# Patient Record
Sex: FEMALE | Race: WHITE | NOT HISPANIC OR LATINO | Employment: OTHER | URBAN - NONMETROPOLITAN AREA
[De-identification: names, ages, dates, MRNs, and addresses within clinical notes are randomized per-mention and may not be internally consistent; named-entity substitution may affect disease eponyms.]

---

## 2017-02-14 ENCOUNTER — OFFICE VISIT (OUTPATIENT)
Dept: INTERNAL MEDICINE | Facility: CLINIC | Age: 67
End: 2017-02-14

## 2017-02-14 VITALS
OXYGEN SATURATION: 97 % | TEMPERATURE: 97.6 F | BODY MASS INDEX: 29.71 KG/M2 | WEIGHT: 174 LBS | HEART RATE: 88 BPM | DIASTOLIC BLOOD PRESSURE: 66 MMHG | RESPIRATION RATE: 14 BRPM | SYSTOLIC BLOOD PRESSURE: 110 MMHG | HEIGHT: 64 IN

## 2017-02-14 DIAGNOSIS — I10 ESSENTIAL HYPERTENSION: Primary | ICD-10-CM

## 2017-02-14 DIAGNOSIS — Z23 NEED FOR IMMUNIZATION AGAINST INFLUENZA: ICD-10-CM

## 2017-02-14 DIAGNOSIS — M85.80 OSTEOPENIA: ICD-10-CM

## 2017-02-14 DIAGNOSIS — E78.5 DYSLIPIDEMIA: ICD-10-CM

## 2017-02-14 DIAGNOSIS — K21.9 GASTROESOPHAGEAL REFLUX DISEASE WITHOUT ESOPHAGITIS: ICD-10-CM

## 2017-02-14 DIAGNOSIS — M79.642 PAIN OF LEFT HAND: ICD-10-CM

## 2017-02-14 DIAGNOSIS — E11.8 TYPE 2 DIABETES MELLITUS WITH COMPLICATION, WITHOUT LONG-TERM CURRENT USE OF INSULIN (HCC): ICD-10-CM

## 2017-02-14 LAB
ALBUMIN SERPL-MCNC: 4.4 G/DL (ref 3.2–4.8)
ALBUMIN/GLOB SERPL: 1.9 G/DL (ref 1.5–2.5)
ALP SERPL-CCNC: 70 U/L (ref 25–100)
ALT SERPL W P-5'-P-CCNC: 14 U/L (ref 7–40)
ANION GAP SERPL CALCULATED.3IONS-SCNC: 7 MMOL/L (ref 3–11)
ARTICHOKE IGE QN: 104 MG/DL (ref 0–130)
AST SERPL-CCNC: 17 U/L (ref 0–33)
BILIRUB SERPL-MCNC: 0.6 MG/DL (ref 0.3–1.2)
BUN BLD-MCNC: 10 MG/DL (ref 9–23)
BUN/CREAT SERPL: 14.3 (ref 7–25)
CALCIUM SPEC-SCNC: 10.2 MG/DL (ref 8.7–10.4)
CHLORIDE SERPL-SCNC: 106 MMOL/L (ref 99–109)
CHOLEST SERPL-MCNC: 160 MG/DL (ref 0–200)
CK SERPL-CCNC: 79 U/L (ref 26–174)
CO2 SERPL-SCNC: 30 MMOL/L (ref 20–31)
CREAT BLD-MCNC: 0.7 MG/DL (ref 0.6–1.3)
GFR SERPL CREATININE-BSD FRML MDRD: 84 ML/MIN/1.73
GLOBULIN UR ELPH-MCNC: 2.3 GM/DL
GLUCOSE BLD-MCNC: 134 MG/DL (ref 70–100)
HBA1C MFR BLD: 6.7 % (ref 4.8–5.6)
HCV AB SER DONR QL: NORMAL
HDLC SERPL-MCNC: 41 MG/DL (ref 40–60)
POTASSIUM BLD-SCNC: 4 MMOL/L (ref 3.5–5.5)
PROT SERPL-MCNC: 6.7 G/DL (ref 5.7–8.2)
SODIUM BLD-SCNC: 143 MMOL/L (ref 132–146)
TRIGL SERPL-MCNC: 136 MG/DL (ref 0–150)

## 2017-02-14 PROCEDURE — 99214 OFFICE O/P EST MOD 30 MIN: CPT | Performed by: INTERNAL MEDICINE

## 2017-02-14 PROCEDURE — 90662 IIV NO PRSV INCREASED AG IM: CPT | Performed by: INTERNAL MEDICINE

## 2017-02-14 PROCEDURE — 90471 IMMUNIZATION ADMIN: CPT | Performed by: INTERNAL MEDICINE

## 2017-02-14 PROCEDURE — 80061 LIPID PANEL: CPT | Performed by: INTERNAL MEDICINE

## 2017-02-14 PROCEDURE — 82550 ASSAY OF CK (CPK): CPT | Performed by: INTERNAL MEDICINE

## 2017-02-14 PROCEDURE — 80053 COMPREHEN METABOLIC PANEL: CPT | Performed by: INTERNAL MEDICINE

## 2017-02-14 PROCEDURE — 86803 HEPATITIS C AB TEST: CPT | Performed by: INTERNAL MEDICINE

## 2017-02-14 PROCEDURE — 83036 HEMOGLOBIN GLYCOSYLATED A1C: CPT | Performed by: INTERNAL MEDICINE

## 2017-02-14 RX ORDER — LISINOPRIL 5 MG/1
5 TABLET ORAL DAILY
Qty: 90 TABLET | Refills: 3 | Status: SHIPPED | OUTPATIENT
Start: 2017-02-14 | End: 2018-06-04 | Stop reason: SDUPTHER

## 2017-02-14 RX ORDER — LOVASTATIN 40 MG/1
40 TABLET ORAL DAILY
Qty: 90 TABLET | Refills: 3 | Status: SHIPPED | OUTPATIENT
Start: 2017-02-14 | End: 2018-06-04 | Stop reason: SDUPTHER

## 2017-02-14 NOTE — PROGRESS NOTES
Subjective   Reema Brothers is a 66 y.o. female.     Chief Complaint   Patient presents with   • Hypertension     here for follow up        History of Present Illness   Patient here for follow-up.  Diabetes is stable medication.  Hyperlipidemia stable medication.  Hypothyroidism stable medication.  Vitamin D deficiency on supplement to stable.  Hypertension stable medication.  Allergies stable medication.  DEXA scan showed osteopenia on calcium and vitamin D now.  Complaining occasional heartburn epigastric area patient is not taking stomach medication.  Left hand arthritis refused to see rheumatologist or hand surgeon.  Patient still smokes.    Current Outpatient Prescriptions:   •  aspirin (ASPIRIN EC LO-DOSE) 81 MG EC tablet, Take 1 tablet by mouth daily., Disp: , Rfl:   •  Cholecalciferol (VITAMIN D3) 400 UNITS capsule, Take 1 tablet by mouth daily., Disp: , Rfl:   •  levothyroxine (SYNTHROID, LEVOTHROID) 100 MCG tablet, TAKE ONE TABLET BY MOUTH ONCE DAILY, Disp: 90 tablet, Rfl: 0  •  lisinopril (PRINIVIL,ZESTRIL) 5 MG tablet, Take 1 tablet by mouth Daily., Disp: 90 tablet, Rfl: 3  •  lovastatin (MEVACOR) 40 MG tablet, Take 1 tablet by mouth Daily., Disp: 90 tablet, Rfl: 3  •  metFORMIN (GLUCOPHAGE) 500 MG tablet, Take 1 tablet by mouth 2 (Two) Times a Day., Disp: 180 tablet, Rfl: 3    The following portions of the patient's history were reviewed and updated as appropriate: allergies, current medications, past family history, past medical history, past social history, past surgical history and problem list.    Review of Systems   Constitutional: Negative.    Respiratory: Negative.    Cardiovascular: Negative.    Gastrointestinal: Positive for abdominal pain (epigastric the heartburnmild tenderness).   Musculoskeletal: Negative.    Skin: Negative.    Neurological: Negative.    Psychiatric/Behavioral: Negative.        Objective   Physical Exam   Constitutional: She is oriented to person, place, and time. She  appears well-nourished.   Neck: Neck supple.   Cardiovascular: Normal rate, regular rhythm and normal heart sounds.    Pulmonary/Chest: Effort normal and breath sounds normal.   Abdominal: Bowel sounds are normal. She exhibits no mass. There is tenderness (mild tenderness epigastric area). There is no rebound and no guarding. No hernia.   Neurological: She is alert and oriented to person, place, and time.   Skin: Skin is warm.   Psychiatric: She has a normal mood and affect.       All tests have been reviewed.    Assessment/Plan   There are no diagnoses linked to this encounter.          Diabetes continue medicine do lab  Hyperlipidemia continue medicine. Do lab  Hypothyroidism stable medication continue  Vitamin D deficiency continue supplements 2000u daily do lab  hypertension continue medication   Allergies continue medication  osteopenia continue calcium. S/p dexa  GERD heartburn encourage patient to start the Nexium patient already has.  Patient declined EGD  left hand unable to  and tingling pending to see hand surgeon or rheum, decline now  refuse colonoscopy again 2/2017  tob to quit  gyn per gyn done,   flu and pneumovax done, zostavax 2015, prevnar 8/2016  follow up 10 day after labs

## 2017-02-23 ENCOUNTER — OFFICE VISIT (OUTPATIENT)
Dept: INTERNAL MEDICINE | Facility: CLINIC | Age: 67
End: 2017-02-23

## 2017-02-23 VITALS
SYSTOLIC BLOOD PRESSURE: 122 MMHG | TEMPERATURE: 97.6 F | DIASTOLIC BLOOD PRESSURE: 66 MMHG | OXYGEN SATURATION: 97 % | HEIGHT: 64 IN | WEIGHT: 174 LBS | BODY MASS INDEX: 29.71 KG/M2 | HEART RATE: 88 BPM | RESPIRATION RATE: 14 BRPM

## 2017-02-23 DIAGNOSIS — E03.8 OTHER SPECIFIED HYPOTHYROIDISM: ICD-10-CM

## 2017-02-23 DIAGNOSIS — M85.80 OSTEOPENIA: ICD-10-CM

## 2017-02-23 DIAGNOSIS — M54.50 BILATERAL LOW BACK PAIN WITHOUT SCIATICA, UNSPECIFIED CHRONICITY: ICD-10-CM

## 2017-02-23 DIAGNOSIS — K21.9 GASTROESOPHAGEAL REFLUX DISEASE WITHOUT ESOPHAGITIS: ICD-10-CM

## 2017-02-23 DIAGNOSIS — I10 ESSENTIAL HYPERTENSION: Primary | ICD-10-CM

## 2017-02-23 DIAGNOSIS — E11.8 TYPE 2 DIABETES MELLITUS WITH COMPLICATION, WITHOUT LONG-TERM CURRENT USE OF INSULIN (HCC): ICD-10-CM

## 2017-02-23 DIAGNOSIS — Q89.1 CONGENITAL ANOMALY OF ADRENAL GLAND: ICD-10-CM

## 2017-02-23 DIAGNOSIS — E78.5 DYSLIPIDEMIA: ICD-10-CM

## 2017-02-23 DIAGNOSIS — E55.9 VITAMIN D DEFICIENCY: ICD-10-CM

## 2017-02-23 DIAGNOSIS — M79.642 PAIN OF LEFT HAND: ICD-10-CM

## 2017-02-23 PROCEDURE — 99214 OFFICE O/P EST MOD 30 MIN: CPT | Performed by: INTERNAL MEDICINE

## 2017-02-23 PROCEDURE — 90714 TD VACC NO PRESV 7 YRS+ IM: CPT | Performed by: INTERNAL MEDICINE

## 2017-02-23 PROCEDURE — 90471 IMMUNIZATION ADMIN: CPT | Performed by: INTERNAL MEDICINE

## 2017-02-23 NOTE — PROGRESS NOTES
Subjective   Reema Brothers is a 66 y.o. female.     Chief Complaint   Patient presents with   • Results     Here for follow up        History of Present Illness   Patient here for follow-up.  The diabetes is stable medication.  Hyperlipidemia stable medication.  Hypothyroidism stable medication.  Vitamin D stable medication.  Hypertension stable medication.  GERD stable medication.  Patient still smokes.    Current Outpatient Prescriptions:   •  aspirin (ASPIRIN EC LO-DOSE) 81 MG EC tablet, Take 1 tablet by mouth daily., Disp: , Rfl:   •  Cholecalciferol (VITAMIN D3) 400 UNITS capsule, Take 1 tablet by mouth daily., Disp: , Rfl:   •  levothyroxine (SYNTHROID, LEVOTHROID) 100 MCG tablet, TAKE ONE TABLET BY MOUTH ONCE DAILY, Disp: 90 tablet, Rfl: 0  •  lisinopril (PRINIVIL,ZESTRIL) 5 MG tablet, Take 1 tablet by mouth Daily., Disp: 90 tablet, Rfl: 3  •  lovastatin (MEVACOR) 40 MG tablet, Take 1 tablet by mouth Daily., Disp: 90 tablet, Rfl: 3  •  metFORMIN (GLUCOPHAGE) 500 MG tablet, Take 1 tablet by mouth 2 (Two) Times a Day., Disp: 180 tablet, Rfl: 3    The following portions of the patient's history were reviewed and updated as appropriate: allergies, current medications, past family history, past medical history, past social history, past surgical history and problem list.    Review of Systems   Constitutional: Negative.    Respiratory: Negative.    Cardiovascular: Negative.    Gastrointestinal: Negative.    Musculoskeletal: Negative.    Skin: Negative.    Neurological: Negative.    Psychiatric/Behavioral: Negative.        Objective   Physical Exam   Constitutional: She is oriented to person, place, and time. She appears well-nourished.   Neck: Neck supple.   Cardiovascular: Normal rate, regular rhythm and normal heart sounds.    Pulmonary/Chest: Effort normal and breath sounds normal.   Abdominal: Bowel sounds are normal.   Neurological: She is alert and oriented to person, place, and time.   Skin: Skin is  warm.   Psychiatric: She has a normal mood and affect.       All tests have been reviewed.    Assessment/Plan   There are no diagnoses linked to this encounter.           Diabetes continue medicine   Hyperlipidemia continue medicine.   Hypothyroidism stable medication continue  Vitamin D deficiency continue supplements 2000u daily  hypertension continue medication   Allergies continue medication  osteopenia continue calcium. S/p dexa  GERD heartburn improved after med, continue.  left hand unable to  and tingling pending to see hand surgeon or rheum, decline now  refuse colonoscopy again 2/2017  tob to quit, do LDCT patient decline.   gyn per gyn done,   VacUTD, flu and pneumovax done, zostavax 2015, prevnar 8/2016, Td today  follow up  6 mo

## 2017-09-05 ENCOUNTER — TRANSCRIBE ORDERS (OUTPATIENT)
Dept: MAMMOGRAPHY | Facility: HOSPITAL | Age: 67
End: 2017-09-05

## 2017-09-05 DIAGNOSIS — Z13.9 SCREENING: Primary | ICD-10-CM

## 2017-09-06 ENCOUNTER — OFFICE VISIT (OUTPATIENT)
Dept: INTERNAL MEDICINE | Facility: CLINIC | Age: 67
End: 2017-09-06

## 2017-09-06 ENCOUNTER — RESULTS ENCOUNTER (OUTPATIENT)
Dept: INTERNAL MEDICINE | Facility: CLINIC | Age: 67
End: 2017-09-06

## 2017-09-06 VITALS
HEIGHT: 64 IN | OXYGEN SATURATION: 97 % | DIASTOLIC BLOOD PRESSURE: 80 MMHG | RESPIRATION RATE: 14 BRPM | BODY MASS INDEX: 32.1 KG/M2 | TEMPERATURE: 98.1 F | HEART RATE: 84 BPM | WEIGHT: 188 LBS | SYSTOLIC BLOOD PRESSURE: 124 MMHG

## 2017-09-06 DIAGNOSIS — E78.5 DYSLIPIDEMIA: ICD-10-CM

## 2017-09-06 DIAGNOSIS — E55.9 VITAMIN D DEFICIENCY: ICD-10-CM

## 2017-09-06 DIAGNOSIS — E11.8 TYPE 2 DIABETES MELLITUS WITH COMPLICATION, WITHOUT LONG-TERM CURRENT USE OF INSULIN (HCC): ICD-10-CM

## 2017-09-06 DIAGNOSIS — H61.23 CERUMEN DEBRIS ON TYMPANIC MEMBRANE, BILATERAL: ICD-10-CM

## 2017-09-06 DIAGNOSIS — I10 ESSENTIAL HYPERTENSION: ICD-10-CM

## 2017-09-06 DIAGNOSIS — Q89.1 CONGENITAL ANOMALY OF ADRENAL GLAND: ICD-10-CM

## 2017-09-06 DIAGNOSIS — I10 ESSENTIAL HYPERTENSION: Primary | ICD-10-CM

## 2017-09-06 DIAGNOSIS — E03.8 OTHER SPECIFIED HYPOTHYROIDISM: ICD-10-CM

## 2017-09-06 DIAGNOSIS — K21.9 GASTROESOPHAGEAL REFLUX DISEASE WITHOUT ESOPHAGITIS: ICD-10-CM

## 2017-09-06 DIAGNOSIS — M79.642 PAIN OF LEFT HAND: ICD-10-CM

## 2017-09-06 DIAGNOSIS — M85.80 OSTEOPENIA: ICD-10-CM

## 2017-09-06 PROCEDURE — 99214 OFFICE O/P EST MOD 30 MIN: CPT | Performed by: INTERNAL MEDICINE

## 2017-09-06 PROCEDURE — 69209 REMOVE IMPACTED EAR WAX UNI: CPT | Performed by: INTERNAL MEDICINE

## 2017-09-06 RX ORDER — LEVOTHYROXINE SODIUM 0.1 MG/1
100 TABLET ORAL DAILY
Qty: 90 TABLET | Refills: 3 | Status: SHIPPED | OUTPATIENT
Start: 2017-09-06 | End: 2018-07-21 | Stop reason: SDUPTHER

## 2017-09-06 NOTE — PROGRESS NOTES
Subjective   Reema Brothers is a 67 y.o. female.     Chief Complaint   Patient presents with   • Hypertension     Here for follow up        History of Present Illness   Patient here for follow-up.  Complaining wrist pain carpal tunnel syndrome numbness tingling or radiation to 4 or him some.  Hyperlipidemia stable medication.  Hypothyroidism stable medication.  Hypertension stable medication.  The GERD stable now.  Patient gain 14 pound.  quit smoking for 6 mo.  Vitamin D stable on supplement.  Complaining ear fullness.    Current Outpatient Prescriptions:   •  aspirin (ASPIRIN EC LO-DOSE) 81 MG EC tablet, Take 1 tablet by mouth daily., Disp: , Rfl:   •  Cholecalciferol (VITAMIN D3) 400 UNITS capsule, Take 1 tablet by mouth daily., Disp: , Rfl:   •  levothyroxine (SYNTHROID, LEVOTHROID) 100 MCG tablet, Take 1 tablet by mouth Daily., Disp: 90 tablet, Rfl: 3  •  lisinopril (PRINIVIL,ZESTRIL) 5 MG tablet, Take 1 tablet by mouth Daily., Disp: 90 tablet, Rfl: 3  •  lovastatin (MEVACOR) 40 MG tablet, Take 1 tablet by mouth Daily., Disp: 90 tablet, Rfl: 3  •  metFORMIN (GLUCOPHAGE) 500 MG tablet, Take 1 tablet by mouth 2 (Two) Times a Day., Disp: 180 tablet, Rfl: 3    The following portions of the patient's history were reviewed and updated as appropriate: allergies, current medications, past family history, past medical history, past social history, past surgical history and problem list.    Review of Systems   Constitutional: Negative.    HENT:        Ear fullness   Respiratory: Negative.    Cardiovascular: Negative.    Gastrointestinal: Negative.    Musculoskeletal: Positive for arthralgias.   Skin: Negative.    Neurological: Negative.    Psychiatric/Behavioral: Negative.        Objective   Physical Exam   Constitutional: She is oriented to person, place, and time. She appears well-nourished.   HENT:   sarah cerumen impaction   Neck: Neck supple.   Cardiovascular: Normal rate, regular rhythm and normal heart sounds.     Pulmonary/Chest: Effort normal and breath sounds normal.   Abdominal: Bowel sounds are normal.   Musculoskeletal: She exhibits tenderness.   Neurological: She is alert and oriented to person, place, and time.   Skin: Skin is warm.   Psychiatric: She has a normal mood and affect.         All tests have been reviewed.    Assessment/Plan   There are no diagnoses linked to this encounter.           Diabetes continue medicine   Cerumen impaction s/p irrigation today fullness resolved  Hyperlipidemia continue medicine.   Hypothyroidism stable medication continue  Vitamin D deficiency continue supplements 2000u daily  hypertension continue medication   Allergies continue medication  osteopenia continue calcium. S/p dexa  GERD heartburn improved after med, continue.  left hand unable to  and tingling refer to hand surgeon---  refuse colonoscopy again 2/2017  tob  quit 6 mo , do LDCT patient decline.   gyn per gyn done,   VacUTD, flu and pneumovax done, zostavax 2015, prevnar 8/2016, Td 2/23/17  follow up  1 mo PE after labs    Ear Cerumen Removal Instrumentation  Date/Time: 9/6/2017 11:30 AM  Performed by: BASHIR OLEA  Authorized by: BASHIR OLEA   Consent: Verbal consent obtained.  Risks and benefits: risks, benefits and alternatives were discussed  Consent given by: patient  Patient understanding: patient states understanding of the procedure being performed  Patient consent: the patient's understanding of the procedure matches consent given  Patient identity confirmed: verbally with patient    Anesthesia:  Local Anesthetic: none  Location details: right ear and left ear  Procedure type: irrigation    Sedation:  Patient sedated: no  Patient tolerance: Patient tolerated the procedure well with no immediate complications  Comments: Manual evacuation unsuccessful

## 2017-09-11 ENCOUNTER — APPOINTMENT (OUTPATIENT)
Dept: MAMMOGRAPHY | Facility: HOSPITAL | Age: 67
End: 2017-09-11

## 2017-09-11 ENCOUNTER — HOSPITAL ENCOUNTER (OUTPATIENT)
Dept: MAMMOGRAPHY | Facility: HOSPITAL | Age: 67
Discharge: HOME | End: 2017-09-11
Admitting: INTERNAL MEDICINE

## 2017-09-11 DIAGNOSIS — Z13.9 SCREENING: ICD-10-CM

## 2017-09-11 PROCEDURE — G0202 SCR MAMMO BI INCL CAD: HCPCS

## 2017-09-11 PROCEDURE — 77063 BREAST TOMOSYNTHESIS BI: CPT

## 2017-09-14 ENCOUNTER — TRANSCRIBE ORDERS (OUTPATIENT)
Dept: MAMMOGRAPHY | Facility: HOSPITAL | Age: 67
End: 2017-09-14

## 2017-09-14 DIAGNOSIS — R92.8 ABNORMAL MAMMOGRAM OF LEFT BREAST: Primary | ICD-10-CM

## 2017-09-20 ENCOUNTER — HOSPITAL ENCOUNTER (OUTPATIENT)
Dept: MAMMOGRAPHY | Facility: HOSPITAL | Age: 67
Discharge: HOME | End: 2017-09-20
Admitting: INTERNAL MEDICINE

## 2017-09-20 DIAGNOSIS — R92.8 ABNORMAL MAMMOGRAM OF LEFT BREAST: ICD-10-CM

## 2017-09-20 PROCEDURE — G0206 DX MAMMO INCL CAD UNI: HCPCS

## 2017-09-20 PROCEDURE — G0279 TOMOSYNTHESIS, MAMMO: HCPCS

## 2017-11-10 ENCOUNTER — OFFICE VISIT (OUTPATIENT)
Dept: INTERNAL MEDICINE | Facility: CLINIC | Age: 67
End: 2017-11-10

## 2017-11-10 VITALS
HEART RATE: 80 BPM | TEMPERATURE: 97.7 F | SYSTOLIC BLOOD PRESSURE: 170 MMHG | DIASTOLIC BLOOD PRESSURE: 72 MMHG | BODY MASS INDEX: 31.07 KG/M2 | OXYGEN SATURATION: 97 % | WEIGHT: 182 LBS | HEIGHT: 64 IN

## 2017-11-10 DIAGNOSIS — I10 ESSENTIAL HYPERTENSION: Primary | ICD-10-CM

## 2017-11-10 DIAGNOSIS — M85.80 OSTEOPENIA, UNSPECIFIED LOCATION: ICD-10-CM

## 2017-11-10 DIAGNOSIS — E11.8 TYPE 2 DIABETES MELLITUS WITH COMPLICATION, WITHOUT LONG-TERM CURRENT USE OF INSULIN (HCC): ICD-10-CM

## 2017-11-10 DIAGNOSIS — E03.8 OTHER SPECIFIED HYPOTHYROIDISM: ICD-10-CM

## 2017-11-10 DIAGNOSIS — E55.9 VITAMIN D DEFICIENCY: ICD-10-CM

## 2017-11-10 DIAGNOSIS — K21.9 GASTROESOPHAGEAL REFLUX DISEASE WITHOUT ESOPHAGITIS: ICD-10-CM

## 2017-11-10 DIAGNOSIS — E78.5 DYSLIPIDEMIA: ICD-10-CM

## 2017-11-10 PROCEDURE — 90471 IMMUNIZATION ADMIN: CPT | Performed by: INTERNAL MEDICINE

## 2017-11-10 PROCEDURE — 99397 PER PM REEVAL EST PAT 65+ YR: CPT | Performed by: INTERNAL MEDICINE

## 2017-11-10 PROCEDURE — 90662 IIV NO PRSV INCREASED AG IM: CPT | Performed by: INTERNAL MEDICINE

## 2017-11-10 NOTE — PROGRESS NOTES
Subjective   Reema Brothers is a 67 y.o. female and is here for a comprehensive physical exam.     Do you take any herbs or supplements that were not prescribed by a doctor? no  Are you taking calcium supplements? no  Are you taking aspirin daily? yes      The following portions of the patient's history were reviewed and updated as appropriate: allergies, current medications, past family history, past medical history, past social history, past surgical history and problem list.      Review of Systems   Constitutional: Negative.    HENT: Negative.    Eyes: Negative.    Respiratory: Negative.    Cardiovascular: Negative.    Gastrointestinal: Negative.    Endocrine: Negative.    Genitourinary: Negative.    Musculoskeletal: Negative.    Skin: Negative.    Allergic/Immunologic: Negative.    Neurological: Negative.    Hematological: Negative.    Psychiatric/Behavioral: Negative.          Physical Exam   Constitutional: She is oriented to person, place, and time. She appears well-developed and well-nourished.   HENT:   Head: Normocephalic and atraumatic.   Right Ear: External ear normal.   Left Ear: External ear normal.   Nose: Nose normal.   Mouth/Throat: Oropharynx is clear and moist.   Eyes: Conjunctivae and EOM are normal. Pupils are equal, round, and reactive to light.   Neck: Normal range of motion. Neck supple.   Cardiovascular: Normal rate, regular rhythm, normal heart sounds and intact distal pulses.    Pulmonary/Chest: Effort normal and breath sounds normal.   Abdominal: Soft. Bowel sounds are normal.   Musculoskeletal: Normal range of motion.   Neurological: She is alert and oriented to person, place, and time. She has normal reflexes.   Skin: Skin is warm and dry.   Psychiatric: She has a normal mood and affect. Her behavior is normal. Judgment and thought content normal.       All  tests have been reviewed.    Assessment/Plan          1. Patient Counseling:  --Nutrition: Stressed importance of moderation  in sodium/caffeine intake, saturated fat and cholesterol, caloric balance, sufficient intake of fresh fruits, vegetables, fiber, calcium and iron.  --Exercise: Stressed the importance of regular exercise.   --Injury prevention: Discussed safety belts, safety helmets, smoke detector, smoking near bedding or upholstery.   --Dental health: Discussed importance of regular tooth brushing, flossing, and dental visits.  --Immunizations reviewed.  --Discussed benefits of screening colonoscopy.  --After hours service discussed with patient    2. Discussed the patient's BMI with her.              Diabetes continue medicine   Hyperlipidemia continue medicine.   Hypothyroidism stable medication continue  Vitamin D deficiency continue supplements 2000u daily  hypertension high today increase lisinopril to 10 mg from 5 mg  Allergies continue medication  osteopenia continue calcium. S/p dexa  GERD heartburn improved after med, continue.  left hand unable to  and tingling s/p surgery better  refuse dexa and colonoscopy again 11/2017   tob  quit 6 mo , do LDCT patient decline again 11/2017.   gyn per gyn done,   VacUTD, flu 2017 and pneumovax done, zostavax 2015, prevnar 8/2016, Td 2/23/17  follow up  2 week after labs

## 2017-11-11 LAB
25(OH)D3+25(OH)D2 SERPL-MCNC: 51.7 NG/ML
ALBUMIN SERPL-MCNC: 4.7 G/DL (ref 3.5–5)
ALBUMIN/GLOB SERPL: 1.7 G/DL (ref 1–2)
ALP SERPL-CCNC: 72 U/L (ref 38–126)
ALT SERPL-CCNC: 39 U/L (ref 13–69)
APPEARANCE UR: CLEAR
AST SERPL-CCNC: 25 U/L (ref 15–46)
BACTERIA #/AREA URNS HPF: ABNORMAL /HPF
BASOPHILS # BLD AUTO: 0.05 10*3/MM3 (ref 0–0.2)
BASOPHILS NFR BLD AUTO: 0.6 % (ref 0–2.5)
BILIRUB SERPL-MCNC: 0.6 MG/DL (ref 0.2–1.3)
BILIRUB UR QL STRIP: NEGATIVE
BUN SERPL-MCNC: 12 MG/DL (ref 7–20)
BUN/CREAT SERPL: 13.3 (ref 7.1–23.5)
CALCIUM SERPL-MCNC: 11.2 MG/DL (ref 8.4–10.2)
CASTS URNS MICRO: ABNORMAL
CHLORIDE SERPL-SCNC: 103 MMOL/L (ref 98–107)
CHOLEST SERPL-MCNC: 175 MG/DL (ref 0–199)
CO2 SERPL-SCNC: 26 MMOL/L (ref 26–30)
COLOR UR: YELLOW
CREAT SERPL-MCNC: 0.9 MG/DL (ref 0.6–1.3)
CRP SERPL-MCNC: <0.5 MG/DL (ref 0–1)
EOSINOPHIL # BLD AUTO: 0.05 10*3/MM3 (ref 0–0.7)
EOSINOPHIL NFR BLD AUTO: 0.6 % (ref 0–7)
EPI CELLS #/AREA URNS HPF: ABNORMAL /HPF
ERYTHROCYTE [DISTWIDTH] IN BLOOD BY AUTOMATED COUNT: 12.8 % (ref 11.5–14.5)
GFR SERPLBLD CREATININE-BSD FMLA CKD-EPI: 63 ML/MIN/1.73
GFR SERPLBLD CREATININE-BSD FMLA CKD-EPI: 76 ML/MIN/1.73
GLOBULIN SER CALC-MCNC: 2.7 GM/DL
GLUCOSE SERPL-MCNC: 138 MG/DL (ref 74–98)
GLUCOSE UR QL: NEGATIVE
HBA1C MFR BLD: 8 %
HCT VFR BLD AUTO: 41.6 % (ref 37–47)
HDLC SERPL-MCNC: 59 MG/DL (ref 40–60)
HGB BLD-MCNC: 13.5 G/DL (ref 12–16)
HGB UR QL STRIP: ABNORMAL
IMM GRANULOCYTES # BLD: 0.02 10*3/MM3 (ref 0–0.06)
IMM GRANULOCYTES NFR BLD: 0.2 % (ref 0–0.6)
KETONES UR QL STRIP: NEGATIVE
LDLC SERPL CALC-MCNC: 93 MG/DL (ref 0–99)
LEUKOCYTE ESTERASE UR QL STRIP: NEGATIVE
LYMPHOCYTES # BLD AUTO: 2.93 10*3/MM3 (ref 0.6–3.4)
LYMPHOCYTES NFR BLD AUTO: 32.6 % (ref 10–50)
MCH RBC QN AUTO: 31.3 PG (ref 27–31)
MCHC RBC AUTO-ENTMCNC: 32.5 G/DL (ref 30–37)
MCV RBC AUTO: 96.3 FL (ref 81–99)
MICROALBUMIN UR-MCNC: 4.4 UG/ML
MONOCYTES # BLD AUTO: 0.64 10*3/MM3 (ref 0–0.9)
MONOCYTES NFR BLD AUTO: 7.1 % (ref 0–12)
MUCOUS THREADS URNS QL MICRO: ABNORMAL /HPF
NEUTROPHILS # BLD AUTO: 5.29 10*3/MM3 (ref 2–6.9)
NEUTROPHILS NFR BLD AUTO: 58.9 % (ref 37–80)
NITRITE UR QL STRIP: NEGATIVE
NRBC BLD AUTO-RTO: 0 /100 WBC (ref 0–0)
PH UR STRIP: 5.5 [PH] (ref 5–8)
PLATELET # BLD AUTO: 279 10*3/MM3 (ref 130–400)
POTASSIUM SERPL-SCNC: 4.3 MMOL/L (ref 3.5–5.1)
PROT SERPL-MCNC: 7.4 G/DL (ref 6.3–8.2)
PROT UR QL STRIP: NEGATIVE
RBC # BLD AUTO: 4.32 10*6/MM3 (ref 4.2–5.4)
RBC #/AREA URNS HPF: ABNORMAL /HPF
SODIUM SERPL-SCNC: 143 MMOL/L (ref 137–145)
SP GR UR: 1.02 (ref 1–1.03)
TRIGL SERPL-MCNC: 115 MG/DL
TSH SERPL DL<=0.005 MIU/L-ACNC: 1.49 MIU/ML (ref 0.47–4.68)
UROBILINOGEN UR STRIP-MCNC: ABNORMAL MG/DL
VLDLC SERPL CALC-MCNC: 23 MG/DL
WBC # BLD AUTO: 8.98 10*3/MM3 (ref 4.8–10.8)
WBC #/AREA URNS HPF: ABNORMAL /HPF

## 2018-03-05 ENCOUNTER — TRANSCRIBE ORDERS (OUTPATIENT)
Dept: ADMINISTRATIVE | Facility: HOSPITAL | Age: 68
End: 2018-03-05

## 2018-03-05 DIAGNOSIS — Z09 FOLLOW UP: Primary | ICD-10-CM

## 2018-03-20 ENCOUNTER — HOSPITAL ENCOUNTER (OUTPATIENT)
Dept: MAMMOGRAPHY | Facility: HOSPITAL | Age: 68
Discharge: HOME | End: 2018-03-20
Admitting: INTERNAL MEDICINE

## 2018-03-20 ENCOUNTER — TELEPHONE (OUTPATIENT)
Dept: INTERNAL MEDICINE | Facility: CLINIC | Age: 68
End: 2018-03-20

## 2018-03-20 DIAGNOSIS — N63.0 BREAST MASS: Primary | ICD-10-CM

## 2018-03-20 DIAGNOSIS — Z09 FOLLOW UP: ICD-10-CM

## 2018-03-20 PROCEDURE — G0279 TOMOSYNTHESIS, MAMMO: HCPCS

## 2018-03-20 PROCEDURE — 77065 DX MAMMO INCL CAD UNI: CPT

## 2018-03-20 NOTE — TELEPHONE ENCOUNTER
[facility] CALLED AND ADVISED THAT PATIENT WILL NEED A BREAST BIOPSY.    LT BIRADS 4 STEROTACTIC BREAST BIOPSY.    PATIENT HAS BEEN INFORMED BY MAMMOGRAPHY. SHE WILL NEED TO CHOOSE A SURGEON.

## 2018-03-22 ENCOUNTER — OFFICE VISIT (OUTPATIENT)
Dept: SURGERY | Facility: CLINIC | Age: 68
End: 2018-03-22

## 2018-03-22 VITALS
HEART RATE: 83 BPM | OXYGEN SATURATION: 98 % | HEIGHT: 64 IN | BODY MASS INDEX: 31.76 KG/M2 | SYSTOLIC BLOOD PRESSURE: 129 MMHG | WEIGHT: 186 LBS | TEMPERATURE: 99.4 F | DIASTOLIC BLOOD PRESSURE: 85 MMHG

## 2018-03-22 DIAGNOSIS — R92.8 ABNORMAL MAMMOGRAM: Primary | ICD-10-CM

## 2018-03-22 PROCEDURE — 99203 OFFICE O/P NEW LOW 30 MIN: CPT | Performed by: SURGERY

## 2018-03-22 RX ORDER — DICLOFENAC SODIUM 75 MG/1
TABLET, DELAYED RELEASE ORAL
COMMUNITY
Start: 2018-03-04 | End: 2018-10-23 | Stop reason: SDUPTHER

## 2018-03-22 NOTE — PROGRESS NOTES
Patient: Reema Brothers    YOB: 1950    Date: 03/22/2018    Primary Care Provider: Mikey Hyde MD    Reason for consultation: breast mass    Chief Complaint   Patient presents with   • Abnormal Breast Imaging     Left breast mass       Subjective .     History of present illness:  I saw the patient in the office  today for evaluation and treatment of a breast mass. She had a mammogram on 03/05/18 that was BIRADS 4. She states she has a sister and maternal aunt with a history of breast cancer. She denies breast pain, skin discoloration and nipple drainage.     Review of Systems   Constitutional: Negative for chills, fever and unexpected weight change.   HENT: Negative for hearing loss, trouble swallowing and voice change.    Eyes: Negative for visual disturbance.   Respiratory: Negative for apnea, cough, chest tightness, shortness of breath and wheezing.    Cardiovascular: Negative for chest pain, palpitations and leg swelling.   Gastrointestinal: Negative for abdominal distention, abdominal pain, anal bleeding, blood in stool, constipation, diarrhea, nausea, rectal pain and vomiting.   Endocrine: Negative for cold intolerance and heat intolerance.   Genitourinary: Negative for difficulty urinating, dysuria and flank pain.   Musculoskeletal: Negative for back pain and gait problem.   Skin: Negative for color change, rash and wound.        Breast mass   Neurological: Negative for dizziness, syncope, speech difficulty, weakness, light-headedness, numbness and headaches.   Hematological: Negative for adenopathy. Does not bruise/bleed easily.   Psychiatric/Behavioral: Negative for confusion. The patient is not nervous/anxious.        History:  Past Medical History:   Diagnosis Date   • Allergic rhinitis    • Arthritis    • Diabetes mellitus    • Disease of thyroid gland    • GERD (gastroesophageal reflux disease)    • Hyperlipidemia    • Hypertension    • Rheumatic fever           Past Surgical  History:   Procedure Laterality Date   • BREAST SURGERY     • CHOLECYSTECTOMY     • KNEE SURGERY     • SHOULDER SURGERY     • TUBAL ABDOMINAL LIGATION     • WRIST SURGERY         Family History   Problem Relation Age of Onset   • Arthritis Other    • Thyroid disease Other    • Diabetes Other    • Obesity Other    • Kidney disease Other    • Liver disease Other        Social History   Substance Use Topics   • Smoking status: Current Every Day Smoker     Packs/day: 1.00   • Smokeless tobacco: Not on file   • Alcohol use No       Allergies:  Allergies   Allergen Reactions   • Codeine    • Nubain [Nalbuphine]        Medications:     Current Outpatient Prescriptions:   •  aspirin (ASPIRIN EC LO-DOSE) 81 MG EC tablet, Take 1 tablet by mouth daily., Disp: , Rfl:   •  Cholecalciferol (VITAMIN D3) 400 UNITS capsule, Take 1 tablet by mouth daily., Disp: , Rfl:   •  diclofenac (VOLTAREN) 75 MG EC tablet, , Disp: , Rfl:   •  levothyroxine (SYNTHROID, LEVOTHROID) 100 MCG tablet, Take 1 tablet by mouth Daily., Disp: 90 tablet, Rfl: 3  •  lisinopril (PRINIVIL,ZESTRIL) 5 MG tablet, Take 1 tablet by mouth Daily., Disp: 90 tablet, Rfl: 3  •  lovastatin (MEVACOR) 40 MG tablet, Take 1 tablet by mouth Daily., Disp: 90 tablet, Rfl: 3  •  metFORMIN (GLUCOPHAGE) 500 MG tablet, Take 1 tablet by mouth 2 (Two) Times a Day., Disp: 180 tablet, Rfl: 3    Objective     Vital Signs:   Temp:  [99.4 °F (37.4 °C)] 99.4 °F (37.4 °C)  Heart Rate:  [83] 83  BP: (129)/(85) 129/85    Physical Exam:     General Appearance:    Alert, cooperative, in no acute distress   Head:    Normocephalic, without obvious abnormality, atraumatic   Eyes:            Lids and lashes normal, conjunctivae and sclerae normal, no   icterus, no pallor, corneas clear,   Ears:    Ears appear intact with no abnormalities noted   Throat:   No oral lesions, no thrush, oral mucosa moist   Breast:     No mass palpable in either breast or axilla    Lungs:     Clear to  auscultation,respirations regular, even and                  Unlabored    Heart:    Regular rhythm and normal rate, no murmur, no gallop.   Chest Wall:    No abnormalities observed   Abdomen:     Normal bowel sounds, no masses, no organomegaly, soft        non-tender, non-distended, no guarding.   Extremities:   Moves all extremities well, no edema, no cyanosis, no             redness   Pulses:   Pulses palpable and equal bilaterally   Skin:   No bleeding, bruising or rash   Lymph nodes:   No palpable adenopathy   Neurologic:   Cranial nerves 2 - 12 grossly intact.           Results Review:   I reviewed the patient's new clinical results.      Assessment / Plan:    1. Abnormal mammogram        I did have a detailed and extensive discussion with the patient in the office today and reviewed her recent workup.  I have told the patient that She will need a left breast stereotactic biopsy. I have scheduled that with radiology in [place]. She'll follow up after procedure my office in [place].    Electronically signed by Mariana Tim MD  03/22/18  11:11 AM    Scribed for Mariana Tim MD by Michelle Milligan. 3/22/2018  11:11 AM

## 2018-03-23 ENCOUNTER — TELEPHONE (OUTPATIENT)
Dept: SURGERY | Facility: CLINIC | Age: 68
End: 2018-03-23

## 2018-03-23 NOTE — TELEPHONE ENCOUNTER
I spoke to patient to let her know her left breast stereotactic is at  on 04/02/2018 arrival is 915am.  I explained to her I would call her in a valium and not to take until the girls in mammography told her it was ok , once all consents were signed.  I also gave her a follow up for Dr. Tim on 04/09/2018 at 1pm.  I called in a Valium 10mg to [facility] in [place]

## 2018-04-02 ENCOUNTER — HOSPITAL ENCOUNTER (OUTPATIENT)
Dept: MAMMOGRAPHY | Facility: HOSPITAL | Age: 68
Discharge: HOME | End: 2018-04-02

## 2018-04-02 ENCOUNTER — APPOINTMENT (OUTPATIENT)
Dept: MAMMOGRAPHY | Facility: HOSPITAL | Age: 68
End: 2018-04-02

## 2018-04-02 ENCOUNTER — HOSPITAL ENCOUNTER (OUTPATIENT)
Dept: MAMMOGRAPHY | Facility: HOSPITAL | Age: 68
Discharge: HOME | End: 2018-04-02
Admitting: SURGERY

## 2018-04-02 DIAGNOSIS — Z98.890 STATUS POST BREAST BIOPSY: ICD-10-CM

## 2018-04-02 DIAGNOSIS — R92.8 ABNORMAL MAMMOGRAM: ICD-10-CM

## 2018-04-02 PROCEDURE — 76098 X-RAY EXAM SURGICAL SPECIMEN: CPT

## 2018-04-09 ENCOUNTER — OFFICE VISIT (OUTPATIENT)
Dept: SURGERY | Facility: CLINIC | Age: 68
End: 2018-04-09

## 2018-04-09 VITALS
DIASTOLIC BLOOD PRESSURE: 88 MMHG | HEART RATE: 80 BPM | SYSTOLIC BLOOD PRESSURE: 128 MMHG | HEIGHT: 64 IN | TEMPERATURE: 98.8 F | WEIGHT: 186 LBS | BODY MASS INDEX: 31.76 KG/M2 | OXYGEN SATURATION: 99 %

## 2018-04-09 DIAGNOSIS — R92.8 ABNORMAL MAMMOGRAM: ICD-10-CM

## 2018-04-09 DIAGNOSIS — N60.02 BENIGN BREAST CYST IN FEMALE, LEFT: Primary | ICD-10-CM

## 2018-04-09 LAB
LAB AP CASE REPORT: NORMAL
LAB AP CASE REPORT: NORMAL
Lab: NORMAL
Lab: NORMAL
PATH REPORT.FINAL DX SPEC: NORMAL
PATH REPORT.FINAL DX SPEC: NORMAL

## 2018-04-09 PROCEDURE — 99212 OFFICE O/P EST SF 10 MIN: CPT | Performed by: SURGERY

## 2018-04-09 NOTE — PROGRESS NOTES
"Patient: Reema Brothers    YOB: 1950    Date: 04/09/2018    Primary Care Provider: Mikey Hyde MD    Chief Complaint   Patient presents with   • Follow-up     follow up stereotactic       History: I saw the patient in the office today to follow up on a left breast stereotactic, pathology showed benign breast tissue with dense stromal sclerosis/fibroadenomatoid change, minimal ductal hyperplasia without atypia, numerous microcalcifications and negative for malignancy. Patient denies nipple drainage, she complains of pain @ left breast.       The following portions of the patient's history were reviewed and updated as appropriate: allergies, current medications, past family history, past medical history, past social history, past surgical history and problem list.      Review of Systems   Constitutional: Negative for chills, fatigue and fever.   Respiratory: Negative for cough.    Cardiovascular: Negative for chest pain.   Gastrointestinal: Negative for abdominal pain, diarrhea, nausea and vomiting.       Vital Signs  /88   Pulse 80   Temp 98.8 °F (37.1 °C) (Temporal Artery )   Ht 162.6 cm (64\")   Wt 84.4 kg (186 lb)   SpO2 99%   BMI 31.93 kg/m²     Allergies:  Allergies   Allergen Reactions   • Codeine    • Nubain [Nalbuphine]        Medications:    Current Outpatient Prescriptions:   •  aspirin (ASPIRIN EC LO-DOSE) 81 MG EC tablet, Take 1 tablet by mouth daily., Disp: , Rfl:   •  Cholecalciferol (VITAMIN D3) 400 UNITS capsule, Take 1 tablet by mouth daily., Disp: , Rfl:   •  diclofenac (VOLTAREN) 75 MG EC tablet, , Disp: , Rfl:   •  levothyroxine (SYNTHROID, LEVOTHROID) 100 MCG tablet, Take 1 tablet by mouth Daily., Disp: 90 tablet, Rfl: 3  •  lisinopril (PRINIVIL,ZESTRIL) 5 MG tablet, Take 1 tablet by mouth Daily., Disp: 90 tablet, Rfl: 3  •  lovastatin (MEVACOR) 40 MG tablet, Take 1 tablet by mouth Daily., Disp: 90 tablet, Rfl: 3  •  metFORMIN (GLUCOPHAGE) 500 MG tablet, TAKE ONE " TABLET BY MOUTH TWICE DAILY, Disp: 180 tablet, Rfl: 3    Physical Exam:   General Appearance:    Alert, cooperative, in no acute distress   Head:    Normocephalic, without obvious abnormality, atraumatic   Lungs:     Clear to auscultation,respirations regular, even and                  unlabored    Heart:    Regular rhythm and normal rate, normal S1 and S2, no            murmur, no gallop, no rub, no click   Abdomen:     Normal bowel sounds, no masses, no organomegaly, soft        non-tender, non-distended, no guarding, no rebound                tenderness   Extremities:   Moves all extremities well, no edema, no cyanosis, no             redness   Pulses:   Pulses palpable and equal bilaterally   Skin:   No bleeding, bruising or rash       Results Review:   I reviewed the patient's new clinical results.     Assessment/Plan     1. Benign breast cyst in female, left    2. Abnormal mammogram      Reviewed pathology report.  repeat mammogram in 6 months.  Electronically signed by Mariana Tim MD  04/09/18    Scribed for Mariana Tim MD by Ewelina Orona. 4/9/2018  1:21 PM

## 2018-06-04 RX ORDER — LOVASTATIN 40 MG/1
40 TABLET ORAL DAILY
Qty: 90 TABLET | Refills: 0 | Status: SHIPPED | OUTPATIENT
Start: 2018-06-04 | End: 2018-10-22 | Stop reason: SDUPTHER

## 2018-06-04 RX ORDER — LISINOPRIL 5 MG/1
5 TABLET ORAL DAILY
Qty: 90 TABLET | Refills: 0 | Status: SHIPPED | OUTPATIENT
Start: 2018-06-04 | End: 2018-10-22 | Stop reason: SDUPTHER

## 2018-07-23 RX ORDER — LEVOTHYROXINE SODIUM 0.1 MG/1
100 TABLET ORAL DAILY
Qty: 30 TABLET | Refills: 0 | Status: SHIPPED | OUTPATIENT
Start: 2018-07-23 | End: 2018-10-22 | Stop reason: SDUPTHER

## 2018-09-26 ENCOUNTER — TELEPHONE (OUTPATIENT)
Dept: INTERNAL MEDICINE | Facility: CLINIC | Age: 68
End: 2018-09-26

## 2018-09-26 NOTE — TELEPHONE ENCOUNTER
Patient requests a prescription for a yeast infection.  Pharmacy is [facility] in [place].

## 2018-09-27 RX ORDER — FLUCONAZOLE 200 MG/1
200 TABLET ORAL DAILY
Qty: 1 TABLET | Refills: 1 | Status: SHIPPED | OUTPATIENT
Start: 2018-09-27 | End: 2018-09-27 | Stop reason: SDUPTHER

## 2018-09-27 RX ORDER — FLUCONAZOLE 200 MG/1
200 TABLET ORAL DAILY
Qty: 1 TABLET | Refills: 1 | Status: SHIPPED | OUTPATIENT
Start: 2018-09-27 | End: 2018-11-21 | Stop reason: SDUPTHER

## 2018-10-15 ENCOUNTER — TRANSCRIBE ORDERS (OUTPATIENT)
Dept: MAMMOGRAPHY | Facility: HOSPITAL | Age: 68
End: 2018-10-15

## 2018-10-15 DIAGNOSIS — Z87.898 HX OF ABNORMAL MAMMOGRAM: Primary | ICD-10-CM

## 2018-10-17 ENCOUNTER — HOSPITAL ENCOUNTER (OUTPATIENT)
Dept: MAMMOGRAPHY | Facility: HOSPITAL | Age: 68
Discharge: HOME | End: 2018-10-17
Attending: SURGERY | Admitting: SURGERY

## 2018-10-17 DIAGNOSIS — Z87.898 HX OF ABNORMAL MAMMOGRAM: ICD-10-CM

## 2018-10-17 PROCEDURE — 77065 DX MAMMO INCL CAD UNI: CPT

## 2018-10-17 PROCEDURE — G0279 TOMOSYNTHESIS, MAMMO: HCPCS

## 2018-10-22 ENCOUNTER — TELEPHONE (OUTPATIENT)
Dept: INTERNAL MEDICINE | Facility: CLINIC | Age: 68
End: 2018-10-22

## 2018-10-22 RX ORDER — LISINOPRIL 5 MG/1
5 TABLET ORAL DAILY
Qty: 30 TABLET | Refills: 0 | Status: SHIPPED | OUTPATIENT
Start: 2018-10-22 | End: 2018-10-23 | Stop reason: SDUPTHER

## 2018-10-22 RX ORDER — LOVASTATIN 40 MG/1
40 TABLET ORAL DAILY
Qty: 30 TABLET | Refills: 0 | Status: SHIPPED | OUTPATIENT
Start: 2018-10-22 | End: 2018-10-23 | Stop reason: SDUPTHER

## 2018-10-22 RX ORDER — LEVOTHYROXINE SODIUM 0.1 MG/1
100 TABLET ORAL DAILY
Qty: 30 TABLET | Refills: 0 | Status: SHIPPED | OUTPATIENT
Start: 2018-10-22 | End: 2018-10-23 | Stop reason: SDUPTHER

## 2018-10-23 RX ORDER — LISINOPRIL 5 MG/1
5 TABLET ORAL DAILY
Qty: 30 TABLET | Refills: 0 | Status: SHIPPED | OUTPATIENT
Start: 2018-10-23 | End: 2018-12-04 | Stop reason: SDUPTHER

## 2018-10-23 RX ORDER — LEVOTHYROXINE SODIUM 0.1 MG/1
100 TABLET ORAL DAILY
Qty: 30 TABLET | Refills: 0 | Status: SHIPPED | OUTPATIENT
Start: 2018-10-23 | End: 2018-12-04 | Stop reason: SDUPTHER

## 2018-10-23 RX ORDER — DICLOFENAC SODIUM 75 MG/1
75 TABLET, DELAYED RELEASE ORAL DAILY
Qty: 30 TABLET | Refills: 0 | Status: SHIPPED | OUTPATIENT
Start: 2018-10-23 | End: 2021-09-14

## 2018-10-23 RX ORDER — LOVASTATIN 40 MG/1
40 TABLET ORAL DAILY
Qty: 30 TABLET | Refills: 0 | Status: SHIPPED | OUTPATIENT
Start: 2018-10-23 | End: 2018-12-04 | Stop reason: SDUPTHER

## 2018-11-01 ENCOUNTER — FLU SHOT (OUTPATIENT)
Dept: INTERNAL MEDICINE | Facility: CLINIC | Age: 68
End: 2018-11-01

## 2018-11-01 DIAGNOSIS — Z23 NEED FOR HEPATITIS A VACCINATION: Primary | ICD-10-CM

## 2018-11-01 PROCEDURE — 90472 IMMUNIZATION ADMIN EACH ADD: CPT | Performed by: INTERNAL MEDICINE

## 2018-11-01 PROCEDURE — 90662 IIV NO PRSV INCREASED AG IM: CPT | Performed by: INTERNAL MEDICINE

## 2018-11-01 PROCEDURE — 90632 HEPA VACCINE ADULT IM: CPT | Performed by: INTERNAL MEDICINE

## 2018-11-01 PROCEDURE — 90471 IMMUNIZATION ADMIN: CPT | Performed by: INTERNAL MEDICINE

## 2018-11-07 ENCOUNTER — OFFICE VISIT (OUTPATIENT)
Dept: SURGERY | Facility: CLINIC | Age: 68
End: 2018-11-07

## 2018-11-07 VITALS
HEIGHT: 64 IN | OXYGEN SATURATION: 97 % | WEIGHT: 176 LBS | BODY MASS INDEX: 30.05 KG/M2 | TEMPERATURE: 98.7 F | SYSTOLIC BLOOD PRESSURE: 118 MMHG | RESPIRATION RATE: 18 BRPM | HEART RATE: 92 BPM | DIASTOLIC BLOOD PRESSURE: 68 MMHG

## 2018-11-07 DIAGNOSIS — R92.8 ABNORMAL MAMMOGRAM OF LEFT BREAST: Primary | ICD-10-CM

## 2018-11-07 PROCEDURE — 99213 OFFICE O/P EST LOW 20 MIN: CPT | Performed by: SURGERY

## 2018-11-07 NOTE — PROGRESS NOTES
Patient: Reema Brothers    YOB: 1950    Date: 11/07/2018    Primary Care Provider: Mikey Hyde MD    Chief Complaint   Patient presents with   • Follow-up     left breast biopsy and left breast mammogram.       Subjective .     History of present illness:  Pt is s/p left breast biopsy which was done 04/2018, pathology report showed multiple microcalcification's.  Pt is here for 6 month follow up left breast mammogram which was  done on 10/17/2018, it was BI-RADS 2 with benign findings. Pt denies palpable mass in breast and she denies breast pain, skin changes and she denies nipple drainage.    The following portions of the patient's history were reviewed and updated as appropriate: allergies, current medications, past family history, past medical history, past social history, past surgical history and problem list.       Review of Systems   Constitutional: Negative for chills, fever and unexpected weight change.   HENT: Negative for hearing loss, trouble swallowing and voice change.    Eyes: Negative for visual disturbance.   Respiratory: Negative for apnea, cough, chest tightness, shortness of breath and wheezing.    Cardiovascular: Negative for chest pain, palpitations and leg swelling.   Gastrointestinal: Negative for abdominal distention, abdominal pain, anal bleeding, blood in stool, constipation, diarrhea, nausea, rectal pain and vomiting.   Endocrine: Negative for cold intolerance and heat intolerance.   Genitourinary: Negative for difficulty urinating, dysuria and flank pain.   Musculoskeletal: Negative for back pain and gait problem.   Skin: Negative for color change, rash and wound.   Neurological: Negative for dizziness, syncope, speech difficulty, weakness, light-headedness, numbness and headaches.   Hematological: Negative for adenopathy. Does not bruise/bleed easily.   Psychiatric/Behavioral: Negative for confusion. The patient is not nervous/anxious.        Allergies:  Allergies  "  Allergen Reactions   • Codeine Anaphylaxis   • Nubain [Nalbuphine] Other (See Comments)     Excessive sedation       Medications:    Current Outpatient Prescriptions:   •  aspirin (ASPIRIN EC LO-DOSE) 81 MG EC tablet, Take 1 tablet by mouth daily., Disp: , Rfl:   •  Cholecalciferol (VITAMIN D3) 400 UNITS capsule, Take 1 tablet by mouth daily., Disp: , Rfl:   •  diclofenac (VOLTAREN) 75 MG EC tablet, Take 1 tablet by mouth Daily., Disp: 30 tablet, Rfl: 0  •  fluconazole (DIFLUCAN) 200 MG tablet, Take 1 tablet by mouth Daily., Disp: 1 tablet, Rfl: 1  •  levothyroxine (SYNTHROID, LEVOTHROID) 100 MCG tablet, Take 1 tablet by mouth Daily. Needs appt before further refills, Disp: 30 tablet, Rfl: 0  •  lisinopril (PRINIVIL,ZESTRIL) 5 MG tablet, Take 1 tablet by mouth Daily., Disp: 30 tablet, Rfl: 0  •  lovastatin (MEVACOR) 40 MG tablet, Take 1 tablet by mouth Daily., Disp: 30 tablet, Rfl: 0  •  metFORMIN (GLUCOPHAGE) 500 MG tablet, Take 1 tablet by mouth 2 (Two) Times a Day., Disp: 60 tablet, Rfl: 0    History\"  Past Medical History:   Diagnosis Date   • Allergic rhinitis    • Arthritis    • Diabetes mellitus (CMS/HCC)    • Disease of thyroid gland    • GERD (gastroesophageal reflux disease)    • Hyperlipidemia    • Hypertension    • Rheumatic fever        Past Surgical History:   Procedure Laterality Date   • BREAST SURGERY     • CHOLECYSTECTOMY     • KNEE SURGERY     • SHOULDER SURGERY     • TUBAL ABDOMINAL LIGATION     • WRIST SURGERY         Family History   Problem Relation Age of Onset   • Arthritis Other    • Thyroid disease Other    • Diabetes Other    • Obesity Other    • Kidney disease Other    • Liver disease Other    • No Known Problems Mother    • No Known Problems Father    • No Known Problems Sister    • No Known Problems Brother        Social History   Substance Use Topics   • Smoking status: Former Smoker     Packs/day: 1.00   • Smokeless tobacco: Never Used      Comment: Pt stated that she does vapor " "cigarette   • Alcohol use No        Objective     Vital Signs:   Vitals:    11/07/18 1056   BP: 118/68   Pulse: 92   Resp: 18   Temp: 98.7 °F (37.1 °C)   TempSrc: Temporal Artery    SpO2: 97%   Weight: 79.8 kg (176 lb)   Height: 162.6 cm (64\")       Physical Exam:   General Appearance:    Alert, cooperative, in no acute distress   Head:    Normocephalic, without obvious abnormality, atraumatic   Eyes:            Lids and lashes normal, conjunctivae and sclerae normal, no   icterus, no pallor, corneas clear, PERRLA   Ears:    Ears appear intact with no abnormalities noted   Throat:   No oral lesions, no thrush, oral mucosa moist   Neck:   No adenopathy, supple, trachea midline, no thyromegaly, no   carotid bruit, no JVD   Lungs:     Clear to auscultation,respirations regular, even and                  unlabored    Heart:    Regular rhythm and normal rate, normal S1 and S2, no            murmur, no gallop, no rub, no click   Chest Wall:    No abnormalities observed   Abdomen:     Normal bowel sounds, no masses, no organomegaly, soft        non-tender, non-distended, no guarding, no rebound                tenderness   Extremities:   Moves all extremities well, no edema, no cyanosis, no             redness   Pulses:   Pulses palpable and equal bilaterally   Skin:   No bleeding, bruising or rash   Lymph nodes:   No palpable adenopathy   Neurologic:   Cranial nerves 2 - 12 grossly intact, sensation intact, DTR       present and equal bilaterally       Results Review:   I reviewed the patient's new clinical results.    Assessment/Plan     1. Abnormal mammogram of left breast        F/u 1 year for US and mammogram    I discussed the patients findings and my recommendations with patient    Review of Systems was reviewed and confirmed as accurate today.    Electronically signed by Mariana Tim MD  11/07/18      .  Portions of this note have been scribed for Mariana Tim MD by Fatmata Romano. 11/7/2018  3:49 " PM

## 2018-11-19 RX ORDER — LEVOTHYROXINE SODIUM 0.1 MG/1
TABLET ORAL
Qty: 30 TABLET | Refills: 0 | Status: SHIPPED | OUTPATIENT
Start: 2018-11-19 | End: 2018-12-04

## 2018-11-19 RX ORDER — LEVOTHYROXINE SODIUM 0.1 MG/1
TABLET ORAL
Qty: 90 TABLET | Refills: 0 | OUTPATIENT
Start: 2018-11-19

## 2018-11-21 ENCOUNTER — TELEPHONE (OUTPATIENT)
Dept: INTERNAL MEDICINE | Facility: CLINIC | Age: 68
End: 2018-11-21

## 2018-11-21 RX ORDER — FLUCONAZOLE 200 MG/1
200 TABLET ORAL DAILY
Qty: 1 TABLET | Refills: 1 | Status: SHIPPED | OUTPATIENT
Start: 2018-11-21 | End: 2019-07-11

## 2018-11-21 NOTE — TELEPHONE ENCOUNTER
States she has a yeast infection and would like to talk to you about it. Please call patient - [phone].

## 2018-12-04 ENCOUNTER — OFFICE VISIT (OUTPATIENT)
Dept: INTERNAL MEDICINE | Facility: CLINIC | Age: 68
End: 2018-12-04

## 2018-12-04 VITALS
OXYGEN SATURATION: 97 % | BODY MASS INDEX: 29.53 KG/M2 | SYSTOLIC BLOOD PRESSURE: 118 MMHG | HEART RATE: 72 BPM | WEIGHT: 173 LBS | DIASTOLIC BLOOD PRESSURE: 70 MMHG | HEIGHT: 64 IN

## 2018-12-04 DIAGNOSIS — E11.8 TYPE 2 DIABETES MELLITUS WITH COMPLICATION, WITHOUT LONG-TERM CURRENT USE OF INSULIN (HCC): ICD-10-CM

## 2018-12-04 DIAGNOSIS — N76.0 BV (BACTERIAL VAGINOSIS): Primary | ICD-10-CM

## 2018-12-04 DIAGNOSIS — B96.89 BV (BACTERIAL VAGINOSIS): Primary | ICD-10-CM

## 2018-12-04 PROCEDURE — 99214 OFFICE O/P EST MOD 30 MIN: CPT | Performed by: INTERNAL MEDICINE

## 2018-12-04 RX ORDER — LOVASTATIN 40 MG/1
40 TABLET ORAL DAILY
Qty: 90 TABLET | Refills: 3 | Status: SHIPPED | OUTPATIENT
Start: 2018-12-04 | End: 2019-10-15 | Stop reason: SDUPTHER

## 2018-12-04 RX ORDER — LISINOPRIL 5 MG/1
5 TABLET ORAL DAILY
Qty: 90 TABLET | Refills: 3 | Status: SHIPPED | OUTPATIENT
Start: 2018-12-04 | End: 2019-10-15 | Stop reason: SDUPTHER

## 2018-12-04 RX ORDER — METRONIDAZOLE 500 MG/1
500 TABLET ORAL 3 TIMES DAILY
Qty: 21 TABLET | Refills: 0 | Status: SHIPPED | OUTPATIENT
Start: 2018-12-04 | End: 2019-07-11

## 2018-12-04 RX ORDER — LEVOTHYROXINE SODIUM 0.1 MG/1
100 TABLET ORAL DAILY
Qty: 90 TABLET | Refills: 3 | Status: SHIPPED | OUTPATIENT
Start: 2018-12-04 | End: 2019-08-15 | Stop reason: SDUPTHER

## 2018-12-04 NOTE — PROGRESS NOTES
Subjective   Reema Brothers is a 68 y.o. female.     Chief Complaint   Patient presents with   • Vaginitis     for 2 months        History of Present Illness   2 MO vaginal itch scratch to bleeding , no pain no fever chill . Burning sensation. Copious vaginal discharge cloudy foul smell and milky color. DM out of control  Last A1c 8.0, diflucan no help.     Current Outpatient Medications:   •  aspirin (ASPIRIN EC LO-DOSE) 81 MG EC tablet, Take 1 tablet by mouth daily., Disp: , Rfl:   •  Cholecalciferol (VITAMIN D3) 400 UNITS capsule, Take 1 tablet by mouth daily., Disp: , Rfl:   •  diclofenac (VOLTAREN) 75 MG EC tablet, Take 1 tablet by mouth Daily., Disp: 30 tablet, Rfl: 0  •  fluconazole (DIFLUCAN) 200 MG tablet, Take 1 tablet by mouth Daily., Disp: 1 tablet, Rfl: 1  •  levothyroxine (SYNTHROID, LEVOTHROID) 100 MCG tablet, Take 1 tablet by mouth Daily. Needs appt before further refills, Disp: 90 tablet, Rfl: 3  •  lisinopril (PRINIVIL,ZESTRIL) 5 MG tablet, Take 1 tablet by mouth Daily., Disp: 90 tablet, Rfl: 3  •  lovastatin (MEVACOR) 40 MG tablet, Take 1 tablet by mouth Daily., Disp: 90 tablet, Rfl: 3  •  metFORMIN (GLUCOPHAGE) 500 MG tablet, Take 1 tablet by mouth 2 (Two) Times a Day., Disp: 180 tablet, Rfl: 3  •  metroNIDAZOLE (FLAGYL) 500 MG tablet, Take 1 tablet by mouth 3 (Three) Times a Day., Disp: 21 tablet, Rfl: 0    The following portions of the patient's history were reviewed and updated as appropriate: allergies, current medications, past family history, past medical history, past social history, past surgical history and problem list.    Review of Systems   Constitutional: Negative.    Respiratory: Negative.    Cardiovascular: Negative.    Gastrointestinal: Negative.    Skin: Negative.    Psychiatric/Behavioral: Negative.        Objective   Physical Exam   Constitutional: She is oriented to person, place, and time. She appears well-developed and well-nourished.   Neck: Neck supple.    Cardiovascular: Normal rate, regular rhythm and normal heart sounds.   Pulmonary/Chest: Effort normal and breath sounds normal.   Abdominal: Soft. Bowel sounds are normal.   Genitourinary:   Genitourinary Comments: Vulvavaginal erythema with whitish d/c.    Neurological: She is alert and oriented to person, place, and time.   Psychiatric: She has a normal mood and affect. Her behavior is normal.       All tests have been reviewed.    Assessment/Plan   Diagnoses and all orders for this visit:    BV (bacterial vaginosis)  -     metroNIDAZOLE (FLAGYL) 500 MG tablet; Take 1 tablet by mouth 3 (Three) Times a Day.          -culture pending    ? Yeast but diflucan no help, start monistat  2 week with others    DM do lab

## 2018-12-05 DIAGNOSIS — N76.0 BV (BACTERIAL VAGINOSIS): Primary | ICD-10-CM

## 2018-12-05 DIAGNOSIS — B96.89 BV (BACTERIAL VAGINOSIS): Primary | ICD-10-CM

## 2018-12-07 LAB
A VAGINAE DNA VAG QL NAA+PROBE: NORMAL SCORE
BVAB2 DNA VAG QL NAA+PROBE: NORMAL SCORE
C ALBICANS DNA VAG QL NAA+PROBE: NEGATIVE
C GLABRATA DNA VAG QL NAA+PROBE: NEGATIVE
MEGA1 DNA VAG QL NAA+PROBE: NORMAL SCORE

## 2018-12-14 ENCOUNTER — RESULTS ENCOUNTER (OUTPATIENT)
Dept: INTERNAL MEDICINE | Facility: CLINIC | Age: 68
End: 2018-12-14

## 2018-12-14 ENCOUNTER — OFFICE VISIT (OUTPATIENT)
Dept: INTERNAL MEDICINE | Facility: CLINIC | Age: 68
End: 2018-12-14

## 2018-12-14 VITALS
BODY MASS INDEX: 29.7 KG/M2 | HEIGHT: 64 IN | OXYGEN SATURATION: 98 % | DIASTOLIC BLOOD PRESSURE: 60 MMHG | SYSTOLIC BLOOD PRESSURE: 108 MMHG | HEART RATE: 88 BPM

## 2018-12-14 DIAGNOSIS — B96.89 BV (BACTERIAL VAGINOSIS): ICD-10-CM

## 2018-12-14 DIAGNOSIS — M85.80 OSTEOPENIA, UNSPECIFIED LOCATION: ICD-10-CM

## 2018-12-14 DIAGNOSIS — Z12.11 COLON CANCER SCREENING: ICD-10-CM

## 2018-12-14 DIAGNOSIS — K21.9 GASTROESOPHAGEAL REFLUX DISEASE WITHOUT ESOPHAGITIS: ICD-10-CM

## 2018-12-14 DIAGNOSIS — M54.50 BILATERAL LOW BACK PAIN WITHOUT SCIATICA, UNSPECIFIED CHRONICITY: ICD-10-CM

## 2018-12-14 DIAGNOSIS — Q89.1 CONGENITAL ANOMALY OF ADRENAL GLAND: ICD-10-CM

## 2018-12-14 DIAGNOSIS — E78.5 DYSLIPIDEMIA: ICD-10-CM

## 2018-12-14 DIAGNOSIS — N76.0 BV (BACTERIAL VAGINOSIS): ICD-10-CM

## 2018-12-14 DIAGNOSIS — E11.8 TYPE 2 DIABETES MELLITUS WITH COMPLICATION, WITHOUT LONG-TERM CURRENT USE OF INSULIN (HCC): ICD-10-CM

## 2018-12-14 DIAGNOSIS — E03.8 OTHER SPECIFIED HYPOTHYROIDISM: ICD-10-CM

## 2018-12-14 DIAGNOSIS — I10 ESSENTIAL HYPERTENSION: Primary | ICD-10-CM

## 2018-12-14 DIAGNOSIS — E55.9 VITAMIN D DEFICIENCY: ICD-10-CM

## 2018-12-14 PROCEDURE — 99214 OFFICE O/P EST MOD 30 MIN: CPT | Performed by: INTERNAL MEDICINE

## 2018-12-14 NOTE — PROGRESS NOTES
Subjective   Reema Brothers is a 68 y.o. female.     Chief Complaint   Patient presents with   • Follow-up       History of Present Illness   Legs weak for 2 weeks, Bacterial vaginosis resolved after medication.  Patient complains 2 weeks of lower extremity weakness after vaginosis.  Patient is not doing too much ambulation due to the vaginitis.  Diabetes on medication stable hypertension stable medication hyperlipidemia stable medication hypothyroidism stable medication.  GERD stable medication.  Declined to do colonoscopy    Current Outpatient Medications:   •  aspirin (ASPIRIN EC LO-DOSE) 81 MG EC tablet, Take 1 tablet by mouth daily., Disp: , Rfl:   •  Cholecalciferol (VITAMIN D3) 400 UNITS capsule, Take 1 tablet by mouth daily., Disp: , Rfl:   •  diclofenac (VOLTAREN) 75 MG EC tablet, Take 1 tablet by mouth Daily., Disp: 30 tablet, Rfl: 0  •  fluconazole (DIFLUCAN) 200 MG tablet, Take 1 tablet by mouth Daily., Disp: 1 tablet, Rfl: 1  •  levothyroxine (SYNTHROID, LEVOTHROID) 100 MCG tablet, Take 1 tablet by mouth Daily. Needs appt before further refills, Disp: 90 tablet, Rfl: 3  •  lisinopril (PRINIVIL,ZESTRIL) 5 MG tablet, Take 1 tablet by mouth Daily., Disp: 90 tablet, Rfl: 3  •  lovastatin (MEVACOR) 40 MG tablet, Take 1 tablet by mouth Daily., Disp: 90 tablet, Rfl: 3  •  metFORMIN (GLUCOPHAGE) 500 MG tablet, Take 1 tablet by mouth 2 (Two) Times a Day., Disp: 180 tablet, Rfl: 3  •  metroNIDAZOLE (FLAGYL) 500 MG tablet, Take 1 tablet by mouth 3 (Three) Times a Day., Disp: 21 tablet, Rfl: 0    The following portions of the patient's history were reviewed and updated as appropriate: allergies, current medications, past family history, past medical history, past social history, past surgical history and problem list.    Review of Systems   Constitutional: Negative.    Respiratory: Negative.    Cardiovascular: Negative.    Gastrointestinal: Negative.    Musculoskeletal: Negative.    Skin: Negative.     Neurological: Negative.    Psychiatric/Behavioral: Negative.        Objective   Physical Exam   Constitutional: She is oriented to person, place, and time. She appears well-nourished.   Neck: Neck supple.   Cardiovascular: Normal rate, regular rhythm and normal heart sounds.   Pulmonary/Chest: Effort normal and breath sounds normal.   Abdominal: Bowel sounds are normal.   Neurological: She is alert and oriented to person, place, and time.   Skin: Skin is warm.   Psychiatric: She has a normal mood and affect.       All tests have been reviewed.    Assessment/Plan   There are no diagnoses linked to this encounter.             vaginitis resolved after medicine  Legs weakness  Diabetes continue medicine   Hyperlipidemia continue medicine.   Hypothyroidism stable medication continue  Vitamin D deficiency continue supplements 2000u daily  hypertension continue med  Allergies continue medication  osteopenia continue calcium. S/p dexa  GERD heartburn improved after med, continue.  left hand unable to  and tingling s/p surgery better  refuse dexa and colonoscopy due 11/2017 patient refused now, do cologuard--  tob  quit 6 mo ,  LDCT patient declined   gyn per gyn done,   VacUTD, flu 2018 and pneumovax done, zostavax 2015, prevnar 8/2016, Td 2/23/17  follow up  4 week after labsPE

## 2018-12-15 LAB
25(OH)D3+25(OH)D2 SERPL-MCNC: 33.9 NG/ML
ALBUMIN SERPL-MCNC: 4.5 G/DL (ref 3.5–5)
ALBUMIN/GLOB SERPL: 1.7 G/DL (ref 1–2)
ALP SERPL-CCNC: 91 U/L (ref 38–126)
ALT SERPL-CCNC: 35 U/L (ref 13–69)
APPEARANCE UR: CLEAR
AST SERPL-CCNC: 24 U/L (ref 15–46)
BASOPHILS # BLD AUTO: 0.04 10*3/MM3 (ref 0–0.2)
BASOPHILS NFR BLD AUTO: 0.4 % (ref 0–2.5)
BILIRUB SERPL-MCNC: 1 MG/DL (ref 0.2–1.3)
BILIRUB UR QL STRIP: NEGATIVE
BUN SERPL-MCNC: 14 MG/DL (ref 7–20)
BUN/CREAT SERPL: 20 (ref 7.1–23.5)
CALCIUM SERPL-MCNC: 10 MG/DL (ref 8.4–10.2)
CHLORIDE SERPL-SCNC: 100 MMOL/L (ref 98–107)
CHOLEST SERPL-MCNC: 192 MG/DL (ref 0–199)
CK SERPL-CCNC: 145 U/L (ref 30–170)
CO2 SERPL-SCNC: 25 MMOL/L (ref 26–30)
COLOR UR: YELLOW
CREAT SERPL-MCNC: 0.7 MG/DL (ref 0.6–1.3)
EOSINOPHIL # BLD AUTO: 0.03 10*3/MM3 (ref 0–0.7)
EOSINOPHIL NFR BLD AUTO: 0.3 % (ref 0–7)
ERYTHROCYTE [DISTWIDTH] IN BLOOD BY AUTOMATED COUNT: 12.5 % (ref 11.5–14.5)
GLOBULIN SER CALC-MCNC: 2.7 GM/DL
GLUCOSE SERPL-MCNC: 332 MG/DL (ref 74–98)
GLUCOSE UR QL: ABNORMAL
HBA1C MFR BLD: 11.6 %
HCT VFR BLD AUTO: 43.1 % (ref 37–47)
HDLC SERPL-MCNC: 48 MG/DL (ref 40–60)
HGB BLD-MCNC: 14.2 G/DL (ref 12–16)
HGB UR QL STRIP: NEGATIVE
IMM GRANULOCYTES # BLD: 0.03 10*3/MM3 (ref 0–0.06)
IMM GRANULOCYTES NFR BLD: 0.3 % (ref 0–0.6)
KETONES UR QL STRIP: ABNORMAL
LDLC SERPL CALC-MCNC: 110 MG/DL (ref 0–99)
LEUKOCYTE ESTERASE UR QL STRIP: NEGATIVE
LYMPHOCYTES # BLD AUTO: 1.95 10*3/MM3 (ref 0.6–3.4)
LYMPHOCYTES NFR BLD AUTO: 19.3 % (ref 10–50)
MCH RBC QN AUTO: 31 PG (ref 27–31)
MCHC RBC AUTO-ENTMCNC: 32.9 G/DL (ref 30–37)
MCV RBC AUTO: 94.1 FL (ref 81–99)
MICROALBUMIN UR-MCNC: 24.3 UG/ML
MONOCYTES # BLD AUTO: 0.63 10*3/MM3 (ref 0–0.9)
MONOCYTES NFR BLD AUTO: 6.2 % (ref 0–12)
NEUTROPHILS # BLD AUTO: 7.44 10*3/MM3 (ref 2–6.9)
NEUTROPHILS NFR BLD AUTO: 73.5 % (ref 37–80)
NITRITE UR QL STRIP: NEGATIVE
NRBC BLD AUTO-RTO: 0 /100 WBC (ref 0–0)
PH UR STRIP: 5.5 [PH] (ref 5–8)
PLATELET # BLD AUTO: 272 10*3/MM3 (ref 130–400)
POTASSIUM SERPL-SCNC: 4.6 MMOL/L (ref 3.5–5.1)
PROT SERPL-MCNC: 7.2 G/DL (ref 6.3–8.2)
PROT UR QL STRIP: ABNORMAL
RBC # BLD AUTO: 4.58 10*6/MM3 (ref 4.2–5.4)
SODIUM SERPL-SCNC: 136 MMOL/L (ref 137–145)
SP GR UR: ABNORMAL (ref 1–1.03)
TRIGL SERPL-MCNC: 168 MG/DL
TSH SERPL DL<=0.005 MIU/L-ACNC: 0.65 MIU/ML (ref 0.47–4.68)
UROBILINOGEN UR STRIP-MCNC: ABNORMAL MG/DL
VLDLC SERPL CALC-MCNC: 33.6 MG/DL
WBC # BLD AUTO: 10.12 10*3/MM3 (ref 4.8–10.8)

## 2019-01-17 RX ORDER — LANCETS 30 GAUGE
EACH MISCELLANEOUS
Qty: 100 EACH | Refills: 12 | Status: SHIPPED | OUTPATIENT
Start: 2019-01-17 | End: 2019-02-05 | Stop reason: SDUPTHER

## 2019-02-05 RX ORDER — LANCETS 30 GAUGE
EACH MISCELLANEOUS
Qty: 100 EACH | Refills: 12 | Status: SHIPPED | OUTPATIENT
Start: 2019-02-05

## 2019-05-08 ENCOUNTER — CLINICAL SUPPORT (OUTPATIENT)
Dept: INTERNAL MEDICINE | Facility: CLINIC | Age: 69
End: 2019-05-08

## 2019-05-08 DIAGNOSIS — Z23 IMMUNIZATION DUE: Primary | ICD-10-CM

## 2019-05-08 PROCEDURE — 90632 HEPA VACCINE ADULT IM: CPT | Performed by: INTERNAL MEDICINE

## 2019-05-08 PROCEDURE — 90471 IMMUNIZATION ADMIN: CPT | Performed by: INTERNAL MEDICINE

## 2019-07-11 ENCOUNTER — OFFICE VISIT (OUTPATIENT)
Dept: INTERNAL MEDICINE | Facility: CLINIC | Age: 69
End: 2019-07-11

## 2019-07-11 VITALS
TEMPERATURE: 98.3 F | BODY MASS INDEX: 29.02 KG/M2 | OXYGEN SATURATION: 98 % | DIASTOLIC BLOOD PRESSURE: 70 MMHG | SYSTOLIC BLOOD PRESSURE: 112 MMHG | HEART RATE: 99 BPM | WEIGHT: 170 LBS | HEIGHT: 64 IN

## 2019-07-11 DIAGNOSIS — E78.5 DYSLIPIDEMIA: ICD-10-CM

## 2019-07-11 DIAGNOSIS — E55.9 VITAMIN D DEFICIENCY: ICD-10-CM

## 2019-07-11 DIAGNOSIS — E03.8 OTHER SPECIFIED HYPOTHYROIDISM: ICD-10-CM

## 2019-07-11 DIAGNOSIS — Q89.1 CONGENITAL ANOMALY OF ADRENAL GLAND: ICD-10-CM

## 2019-07-11 DIAGNOSIS — K21.9 GASTROESOPHAGEAL REFLUX DISEASE WITHOUT ESOPHAGITIS: ICD-10-CM

## 2019-07-11 DIAGNOSIS — M54.50 BILATERAL LOW BACK PAIN WITHOUT SCIATICA, UNSPECIFIED CHRONICITY: ICD-10-CM

## 2019-07-11 DIAGNOSIS — M85.80 OSTEOPENIA, UNSPECIFIED LOCATION: ICD-10-CM

## 2019-07-11 DIAGNOSIS — Z00.00 PREVENTATIVE HEALTH CARE: ICD-10-CM

## 2019-07-11 DIAGNOSIS — I10 ESSENTIAL HYPERTENSION: Primary | ICD-10-CM

## 2019-07-11 DIAGNOSIS — E11.8 TYPE 2 DIABETES MELLITUS WITH COMPLICATION, WITHOUT LONG-TERM CURRENT USE OF INSULIN (HCC): ICD-10-CM

## 2019-07-11 PROBLEM — N76.0 BV (BACTERIAL VAGINOSIS): Status: RESOLVED | Noted: 2018-12-04 | Resolved: 2019-07-11

## 2019-07-11 PROBLEM — B96.89 BV (BACTERIAL VAGINOSIS): Status: RESOLVED | Noted: 2018-12-04 | Resolved: 2019-07-11

## 2019-07-11 PROCEDURE — 99397 PER PM REEVAL EST PAT 65+ YR: CPT | Performed by: INTERNAL MEDICINE

## 2019-07-11 NOTE — PROGRESS NOTES
Subjective   Reema Brothers is a 69 y.o. female and is here for a comprehensive physical exam.     Do you take any herbs or supplements that were not prescribed by a doctor? no  Are you taking calcium supplements? no  Are you taking aspirin daily? no      The following portions of the patient's history were reviewed and updated as appropriate: allergies, current medications, past family history, past medical history, past social history, past surgical history and problem list.      Review of Systems   Constitutional: Negative.    HENT: Negative.    Eyes: Negative.    Respiratory: Negative.    Cardiovascular: Negative.    Gastrointestinal: Negative.    Endocrine: Negative.    Genitourinary: Negative.    Musculoskeletal: Negative.    Skin: Negative.    Allergic/Immunologic: Negative.    Neurological: Negative.    Hematological: Negative.    Psychiatric/Behavioral: Negative.          Physical Exam   Constitutional: She is oriented to person, place, and time. She appears well-developed and well-nourished.   HENT:   Head: Normocephalic and atraumatic.   Right Ear: External ear normal.   Left Ear: External ear normal.   Nose: Nose normal.   Mouth/Throat: Oropharynx is clear and moist.   Eyes: Conjunctivae and EOM are normal. Pupils are equal, round, and reactive to light.   Neck: Normal range of motion. Neck supple.   Cardiovascular: Normal rate, regular rhythm, normal heart sounds and intact distal pulses.   Pulmonary/Chest: Effort normal and breath sounds normal.   Abdominal: Soft. Bowel sounds are normal.   Genitourinary: Vagina normal and uterus normal.   Musculoskeletal: Normal range of motion.   Neurological: She is alert and oriented to person, place, and time. She has normal reflexes.   Skin: Skin is warm and dry.   Psychiatric: She has a normal mood and affect. Her behavior is normal. Judgment and thought content normal.       All  tests have been reviewed.    Assessment/Plan          1. Patient  Counseling:  --Nutrition: Stressed importance of moderation in sodium/caffeine intake, saturated fat and cholesterol, caloric balance, sufficient intake of fresh fruits, vegetables, fiber, calcium and iron.  --Exercise: Stressed the importance of regular exercise.   --Injury prevention: Discussed safety belts, safety helmets, smoke detector, smoking near bedding or upholstery.   --Dental health: Discussed importance of regular tooth brushing, flossing, and dental visits.  --Immunizations reviewed.  --Discussed benefits of screening colonoscopy.  --After hours service discussed with patient    2. Discussed the patient's BMI with her.                   Diabetes continue medicine   Hyperlipidemia continue medicine.   Hypothyroidism stable medication continue  Vitamin D deficiency continue supplements 2000u daily  hypertension continue med  Allergies continue medication  osteopenia continue calcium. S/p dexa  GERD heartburn improved after med, continue.  left hand unable to  and tingling s/p surgery better  refuse dexa and colonoscopy due 11/2017 patient refused now, negative cologuard--  tob  quit 6 mo ,  LDCT patient declined   gyn per gyn done,   VacUTD, flu 2018 and pneumovax done, zostavax 2015, prevnar 8/2016, Td 2/23/17  follow up  4 week after labs

## 2019-07-16 ENCOUNTER — APPOINTMENT (OUTPATIENT)
Dept: BONE DENSITY | Facility: HOSPITAL | Age: 69
End: 2019-07-16

## 2019-07-16 PROCEDURE — 77080 DXA BONE DENSITY AXIAL: CPT

## 2019-08-08 LAB
25(OH)D3+25(OH)D2 SERPL-MCNC: 54.7 NG/ML (ref 30–100)
ALBUMIN SERPL-MCNC: 4.3 G/DL (ref 3.5–5.2)
ALBUMIN/GLOB SERPL: 1.7 G/DL
ALP SERPL-CCNC: 59 U/L (ref 39–117)
ALT SERPL-CCNC: 18 U/L (ref 1–33)
APPEARANCE UR: CLEAR
AST SERPL-CCNC: 18 U/L (ref 1–32)
BASOPHILS # BLD AUTO: 0.06 10*3/MM3 (ref 0–0.2)
BASOPHILS NFR BLD AUTO: 0.9 % (ref 0–1.5)
BILIRUB SERPL-MCNC: 0.4 MG/DL (ref 0.2–1.2)
BILIRUB UR QL STRIP: NEGATIVE
BUN SERPL-MCNC: 11 MG/DL (ref 8–23)
BUN/CREAT SERPL: 16.9 (ref 7–25)
CALCIUM SERPL-MCNC: 9.5 MG/DL (ref 8.6–10.5)
CHLORIDE SERPL-SCNC: 107 MMOL/L (ref 98–107)
CHOLEST SERPL-MCNC: 141 MG/DL (ref 0–200)
CK SERPL-CCNC: 95 U/L (ref 20–180)
CO2 SERPL-SCNC: 26.6 MMOL/L (ref 22–29)
COLOR UR: YELLOW
CREAT SERPL-MCNC: 0.65 MG/DL (ref 0.57–1)
EOSINOPHIL # BLD AUTO: 0.06 10*3/MM3 (ref 0–0.4)
EOSINOPHIL NFR BLD AUTO: 0.9 % (ref 0.3–6.2)
ERYTHROCYTE [DISTWIDTH] IN BLOOD BY AUTOMATED COUNT: 13.2 % (ref 12.3–15.4)
GLOBULIN SER CALC-MCNC: 2.5 GM/DL
GLUCOSE SERPL-MCNC: 149 MG/DL (ref 65–99)
GLUCOSE UR QL: NEGATIVE
HBA1C MFR BLD: 7.97 % (ref 4.8–5.6)
HCT VFR BLD AUTO: 38.3 % (ref 34–46.6)
HDLC SERPL-MCNC: 47 MG/DL (ref 40–60)
HGB BLD-MCNC: 12.1 G/DL (ref 12–15.9)
HGB UR QL STRIP: NEGATIVE
IMM GRANULOCYTES # BLD AUTO: 0.01 10*3/MM3 (ref 0–0.05)
IMM GRANULOCYTES NFR BLD AUTO: 0.2 % (ref 0–0.5)
KETONES UR QL STRIP: NEGATIVE
LDLC SERPL CALC-MCNC: 80 MG/DL (ref 0–100)
LEUKOCYTE ESTERASE UR QL STRIP: NEGATIVE
LYMPHOCYTES # BLD AUTO: 2.42 10*3/MM3 (ref 0.7–3.1)
LYMPHOCYTES NFR BLD AUTO: 37.8 % (ref 19.6–45.3)
MCH RBC QN AUTO: 30.9 PG (ref 26.6–33)
MCHC RBC AUTO-ENTMCNC: 31.6 G/DL (ref 31.5–35.7)
MCV RBC AUTO: 98 FL (ref 79–97)
MICROALBUMIN UR-MCNC: <3 UG/ML
MONOCYTES # BLD AUTO: 0.55 10*3/MM3 (ref 0.1–0.9)
MONOCYTES NFR BLD AUTO: 8.6 % (ref 5–12)
NEUTROPHILS # BLD AUTO: 3.31 10*3/MM3 (ref 1.7–7)
NEUTROPHILS NFR BLD AUTO: 51.6 % (ref 42.7–76)
NITRITE UR QL STRIP: NEGATIVE
NRBC BLD AUTO-RTO: 0 /100 WBC (ref 0–0.2)
PH UR STRIP: 5.5 [PH] (ref 5–8)
PLATELET # BLD AUTO: 244 10*3/MM3 (ref 140–450)
POTASSIUM SERPL-SCNC: 5 MMOL/L (ref 3.5–5.2)
PROT SERPL-MCNC: 6.8 G/DL (ref 6–8.5)
PROT UR QL STRIP: NEGATIVE
RBC # BLD AUTO: 3.91 10*6/MM3 (ref 3.77–5.28)
SODIUM SERPL-SCNC: 145 MMOL/L (ref 136–145)
SP GR UR: 1.01 (ref 1–1.03)
TRIGL SERPL-MCNC: 69 MG/DL (ref 0–150)
TSH SERPL DL<=0.005 MIU/L-ACNC: 0.07 MIU/ML (ref 0.27–4.2)
UROBILINOGEN UR STRIP-MCNC: NORMAL MG/DL
VLDLC SERPL CALC-MCNC: 13.8 MG/DL
WBC # BLD AUTO: 6.41 10*3/MM3 (ref 3.4–10.8)

## 2019-08-15 ENCOUNTER — OFFICE VISIT (OUTPATIENT)
Dept: INTERNAL MEDICINE | Facility: CLINIC | Age: 69
End: 2019-08-15

## 2019-08-15 VITALS
BODY MASS INDEX: 27.79 KG/M2 | SYSTOLIC BLOOD PRESSURE: 124 MMHG | OXYGEN SATURATION: 98 % | DIASTOLIC BLOOD PRESSURE: 72 MMHG | HEIGHT: 64 IN | HEART RATE: 70 BPM | WEIGHT: 162.8 LBS | TEMPERATURE: 96.9 F

## 2019-08-15 DIAGNOSIS — E03.8 OTHER SPECIFIED HYPOTHYROIDISM: ICD-10-CM

## 2019-08-15 DIAGNOSIS — M85.80 OSTEOPENIA, UNSPECIFIED LOCATION: ICD-10-CM

## 2019-08-15 DIAGNOSIS — E55.9 VITAMIN D DEFICIENCY: ICD-10-CM

## 2019-08-15 DIAGNOSIS — E11.8 TYPE 2 DIABETES MELLITUS WITH COMPLICATION, WITHOUT LONG-TERM CURRENT USE OF INSULIN (HCC): ICD-10-CM

## 2019-08-15 DIAGNOSIS — I10 ESSENTIAL HYPERTENSION: Primary | ICD-10-CM

## 2019-08-15 DIAGNOSIS — E78.5 DYSLIPIDEMIA: ICD-10-CM

## 2019-08-15 DIAGNOSIS — K21.9 GASTROESOPHAGEAL REFLUX DISEASE WITHOUT ESOPHAGITIS: ICD-10-CM

## 2019-08-15 PROCEDURE — 99214 OFFICE O/P EST MOD 30 MIN: CPT | Performed by: INTERNAL MEDICINE

## 2019-08-15 RX ORDER — LEVOTHYROXINE SODIUM 0.07 MG/1
TABLET ORAL
Qty: 30 TABLET | Refills: 1 | Status: SHIPPED | OUTPATIENT
Start: 2019-08-15 | End: 2019-10-15 | Stop reason: SDUPTHER

## 2019-08-15 NOTE — PROGRESS NOTES
Subjective   Reema Brothers is a 69 y.o. female.     Chief Complaint   Patient presents with   • Follow-up     1 month f/u       History of Present Illness   Patient here for follow-up of.  The diabetes out of control A1c 7.9.  Hyperlipidemia stable on medication.  TSH is very low on medication.  Hypertension stable on medication.  Vitamin D stable on supplement.  GERD stable now.  Weight loss 6 pound.    Current Outpatient Medications:   •  aspirin (ASPIRIN EC LO-DOSE) 81 MG EC tablet, Take 1 tablet by mouth daily., Disp: , Rfl:   •  Blood Glucose Monitoring Suppl (D-CARE GLUCOMETER) w/Device kit, 1 unit marking on U-100 syringe 3 (Three) Times a Day As Needed (as needed)., Disp: 1 kit, Rfl: 0  •  Cholecalciferol (VITAMIN D3) 400 UNITS capsule, Take 1 tablet by mouth daily., Disp: , Rfl:   •  diclofenac (VOLTAREN) 75 MG EC tablet, Take 1 tablet by mouth Daily., Disp: 30 tablet, Rfl: 0  •  glucose blood test strip, Use as instructed, Disp: 100 each, Rfl: 12  •  Lancets misc, Use as directed, Disp: 100 each, Rfl: 12  •  levothyroxine (SYNTHROID, LEVOTHROID) 100 MCG tablet, Take 1 tablet by mouth Daily. Needs appt before further refills, Disp: 90 tablet, Rfl: 3  •  lisinopril (PRINIVIL,ZESTRIL) 5 MG tablet, Take 1 tablet by mouth Daily., Disp: 90 tablet, Rfl: 3  •  lovastatin (MEVACOR) 40 MG tablet, Take 1 tablet by mouth Daily., Disp: 90 tablet, Rfl: 3  •  metFORMIN (GLUCOPHAGE) 500 MG tablet, Take 1 tablet by mouth 2 (Two) Times a Day., Disp: 180 tablet, Rfl: 3    The following portions of the patient's history were reviewed and updated as appropriate: allergies, current medications, past family history, past medical history, past social history, past surgical history and problem list.    Review of Systems   Constitutional: Negative.    Respiratory: Negative.    Cardiovascular: Negative.    Gastrointestinal: Negative.    Musculoskeletal: Negative.    Skin: Negative.    Neurological: Negative.     Psychiatric/Behavioral: Negative.        Objective   Physical Exam   Constitutional: She is oriented to person, place, and time. She appears well-nourished.   Neck: Neck supple.   Cardiovascular: Normal rate, regular rhythm and normal heart sounds.   Pulmonary/Chest: Effort normal and breath sounds normal.   Abdominal: Bowel sounds are normal.   Neurological: She is alert and oriented to person, place, and time.   Skin: Skin is warm.   Psychiatric: She has a normal mood and affect.       All tests have been reviewed.    Assessment/Plan   There are no diagnoses linked to this encounter.          Diabetes continue medicine a1c 7.9 increase metformin 1000 bid repeat in 2 mo  Hyperlipidemia continue medicine.   Hypothyroidism low med to 75 repeat in 6 weeks  Vitamin D deficiency continue supplements 2000u daily  hypertension continue med  Allergies continue medication  osteopenia continue calcium. S/p dexa  GERD heartburn improved after med, continue.  left hand unable to  and tingling s/p surgery better  refuse dexa and colonoscopy due 11/2017 patient refused now, negative cologuard--  tob  quit 6 mo ,  LDCT patient declined   gyn per gyn done,   Weight loss with reason  VacUTD, flu 2018 and pneumovax done, zostavax 2015, prevnar 8/2016, Td 2/23/17  follow up  8 week after labs

## 2019-08-16 ENCOUNTER — TELEPHONE (OUTPATIENT)
Dept: INTERNAL MEDICINE | Facility: CLINIC | Age: 69
End: 2019-08-16

## 2019-08-16 NOTE — TELEPHONE ENCOUNTER
Pts  called the office and stated that the code for pts DEXA scan needed to be changed from diagnostic to preventative. Ericka has changed to icd code from osteoporosis M85.80 to a screening code Z00.00, can you send this information to whomever it needs to go to from here? We are unsure of who.

## 2019-08-16 NOTE — TELEPHONE ENCOUNTER
I spoke with billing and they have already sent this to billing review. It is being worked on and the patient will receive an updated bill once completed.

## 2019-08-21 ENCOUNTER — TRANSCRIBE ORDERS (OUTPATIENT)
Dept: ADMINISTRATIVE | Facility: HOSPITAL | Age: 69
End: 2019-08-21

## 2019-08-21 DIAGNOSIS — Z12.31 ENCOUNTER FOR SCREENING MAMMOGRAM FOR MALIGNANT NEOPLASM OF BREAST: Primary | ICD-10-CM

## 2019-10-14 LAB
BUN SERPL-MCNC: 14 MG/DL (ref 8–23)
BUN/CREAT SERPL: 16.9 (ref 7–25)
CALCIUM SERPL-MCNC: 10.1 MG/DL (ref 8.6–10.5)
CHLORIDE SERPL-SCNC: 103 MMOL/L (ref 98–107)
CO2 SERPL-SCNC: 25.3 MMOL/L (ref 22–29)
CREAT SERPL-MCNC: 0.83 MG/DL (ref 0.57–1)
GLUCOSE SERPL-MCNC: 188 MG/DL (ref 65–99)
HBA1C MFR BLD: 7.4 % (ref 4.8–5.6)
POTASSIUM SERPL-SCNC: 4.5 MMOL/L (ref 3.5–5.2)
SODIUM SERPL-SCNC: 142 MMOL/L (ref 136–145)
TSH SERPL DL<=0.005 MIU/L-ACNC: 2.28 UIU/ML (ref 0.27–4.2)

## 2019-10-15 ENCOUNTER — OFFICE VISIT (OUTPATIENT)
Dept: INTERNAL MEDICINE | Facility: CLINIC | Age: 69
End: 2019-10-15

## 2019-10-15 VITALS
RESPIRATION RATE: 18 BRPM | HEIGHT: 64 IN | TEMPERATURE: 97.4 F | WEIGHT: 173 LBS | DIASTOLIC BLOOD PRESSURE: 62 MMHG | HEART RATE: 81 BPM | OXYGEN SATURATION: 97 % | BODY MASS INDEX: 29.53 KG/M2 | SYSTOLIC BLOOD PRESSURE: 118 MMHG

## 2019-10-15 DIAGNOSIS — E03.8 OTHER SPECIFIED HYPOTHYROIDISM: ICD-10-CM

## 2019-10-15 DIAGNOSIS — K21.9 GASTROESOPHAGEAL REFLUX DISEASE WITHOUT ESOPHAGITIS: ICD-10-CM

## 2019-10-15 DIAGNOSIS — E55.9 VITAMIN D DEFICIENCY: ICD-10-CM

## 2019-10-15 DIAGNOSIS — I10 ESSENTIAL HYPERTENSION: Primary | ICD-10-CM

## 2019-10-15 DIAGNOSIS — E78.5 DYSLIPIDEMIA: ICD-10-CM

## 2019-10-15 DIAGNOSIS — E11.69 TYPE 2 DIABETES MELLITUS WITH OTHER SPECIFIED COMPLICATION, WITHOUT LONG-TERM CURRENT USE OF INSULIN (HCC): ICD-10-CM

## 2019-10-15 DIAGNOSIS — M85.80 OSTEOPENIA, UNSPECIFIED LOCATION: ICD-10-CM

## 2019-10-15 PROCEDURE — 99214 OFFICE O/P EST MOD 30 MIN: CPT | Performed by: INTERNAL MEDICINE

## 2019-10-15 PROCEDURE — 90471 IMMUNIZATION ADMIN: CPT | Performed by: INTERNAL MEDICINE

## 2019-10-15 PROCEDURE — 90653 IIV ADJUVANT VACCINE IM: CPT | Performed by: INTERNAL MEDICINE

## 2019-10-15 RX ORDER — LEVOTHYROXINE SODIUM 0.07 MG/1
TABLET ORAL
Qty: 30 TABLET | Refills: 2 | Status: SHIPPED | OUTPATIENT
Start: 2019-10-15 | End: 2020-01-15

## 2019-10-15 RX ORDER — LISINOPRIL 5 MG/1
5 TABLET ORAL DAILY
Qty: 90 TABLET | Refills: 3 | Status: SHIPPED | OUTPATIENT
Start: 2019-10-15 | End: 2020-12-18

## 2019-10-15 RX ORDER — LOVASTATIN 40 MG/1
40 TABLET ORAL DAILY
Qty: 90 TABLET | Refills: 3 | Status: SHIPPED | OUTPATIENT
Start: 2019-10-15 | End: 2020-11-23

## 2019-10-15 NOTE — PROGRESS NOTES
Subjective   Reema Brothers is a 69 y.o. female.     Chief Complaint   Patient presents with   • Follow-up     f/u 2 mo for lab work       History of Present Illness   Patient here for follow-up of.  Diabetes A1c 7.4 after increasing medication.  Hyperlipidemia stable on medication.  Hypothyroidism stable on medication.  Vitamin D stable on supplement.  Hypertension stable on medication.  Allergies stable on medication.  The GERD stable on medication.    Current Outpatient Medications:   •  aspirin (ASPIRIN EC LO-DOSE) 81 MG EC tablet, Take 1 tablet by mouth daily., Disp: , Rfl:   •  Blood Glucose Monitoring Suppl (D-CARE GLUCOMETER) w/Device kit, 1 unit marking on U-100 syringe 3 (Three) Times a Day As Needed (as needed)., Disp: 1 kit, Rfl: 0  •  Cholecalciferol (VITAMIN D3) 400 UNITS capsule, Take 1 tablet by mouth daily., Disp: , Rfl:   •  diclofenac (VOLTAREN) 75 MG EC tablet, Take 1 tablet by mouth Daily., Disp: 30 tablet, Rfl: 0  •  glucose blood test strip, Use as instructed, Disp: 100 each, Rfl: 12  •  Lancets misc, Use as directed, Disp: 100 each, Rfl: 12  •  levothyroxine (SYNTHROID, LEVOTHROID) 75 MCG tablet, 1 daily po, Disp: 30 tablet, Rfl: 1  •  lisinopril (PRINIVIL,ZESTRIL) 5 MG tablet, Take 1 tablet by mouth Daily., Disp: 90 tablet, Rfl: 3  •  lovastatin (MEVACOR) 40 MG tablet, Take 1 tablet by mouth Daily., Disp: 90 tablet, Rfl: 3  •  metFORMIN (GLUCOPHAGE) 1000 MG tablet, Take 1 tablet by mouth 2 (Two) Times a Day., Disp: 60 tablet, Rfl: 2    The following portions of the patient's history were reviewed and updated as appropriate: allergies, current medications, past family history, past medical history, past social history, past surgical history and problem list.    Review of Systems   Constitutional: Negative.    Respiratory: Negative.    Cardiovascular: Negative.    Gastrointestinal: Negative.    Musculoskeletal: Negative.    Skin: Negative.    Neurological: Negative.     Psychiatric/Behavioral: Negative.        Objective   Physical Exam   Constitutional: She is oriented to person, place, and time. She appears well-nourished.   Neck: Neck supple.   Cardiovascular: Normal rate, regular rhythm and normal heart sounds.   Pulmonary/Chest: Effort normal and breath sounds normal.   Abdominal: Bowel sounds are normal.   Neurological: She is alert and oriented to person, place, and time.   Skin: Skin is warm.   Psychiatric: She has a normal mood and affect.       All tests have been reviewed.    Assessment/Plan   There are no diagnoses linked to this encounter.            Diabetes continue medicine a1c 7.4 after increase metformin 1000 bid repeat in 2 mo  Hyperlipidemia continue medicine.   Hypothyroidism cont med   Vitamin D deficiency continue supplements 2000u daily  hypertension continue med  Allergies continue medication  osteopenia continue calcium. S/p dexa  GERD heartburn improved after med, continue.  left hand unable to  and tingling s/p surgery better  refuse dexa and colonoscopy due 11/2017 patient refused now, negative cologuard--  tob  quit 6 mo ,  LDCT patient declined   gyn per gyn done,   Weight loss with reason  VacUTD, flu 2018 and pneumovax done, zostavax 2015, prevnar 8/2016, Td 2/23/17  follow up  6 mo

## 2019-10-17 ENCOUNTER — HOSPITAL ENCOUNTER (OUTPATIENT)
Dept: MAMMOGRAPHY | Facility: HOSPITAL | Age: 69
Discharge: HOME | End: 2019-10-17
Admitting: SURGERY

## 2019-10-17 DIAGNOSIS — Z12.31 ENCOUNTER FOR SCREENING MAMMOGRAM FOR MALIGNANT NEOPLASM OF BREAST: ICD-10-CM

## 2019-10-17 PROCEDURE — 77063 BREAST TOMOSYNTHESIS BI: CPT

## 2019-10-17 PROCEDURE — 77067 SCR MAMMO BI INCL CAD: CPT

## 2019-10-23 ENCOUNTER — OFFICE VISIT (OUTPATIENT)
Dept: SURGERY | Facility: CLINIC | Age: 69
End: 2019-10-23

## 2019-10-23 VITALS
WEIGHT: 174.4 LBS | TEMPERATURE: 98.1 F | BODY MASS INDEX: 29.78 KG/M2 | HEART RATE: 86 BPM | HEIGHT: 64 IN | DIASTOLIC BLOOD PRESSURE: 64 MMHG | OXYGEN SATURATION: 98 % | SYSTOLIC BLOOD PRESSURE: 124 MMHG

## 2019-10-23 DIAGNOSIS — R92.8 ABNORMAL MAMMOGRAM: Primary | ICD-10-CM

## 2019-10-23 PROCEDURE — 99212 OFFICE O/P EST SF 10 MIN: CPT | Performed by: SURGERY

## 2019-10-23 NOTE — PROGRESS NOTES
Patient: Reema Brothers    YOB: 1950    Date: 10/23/2019    Primary Care Provider: Mikey Hyde MD    Chief Complaint   Patient presents with   • Follow-up     mammogram       Subjective .     History of present illness:  I saw the patient in the office  today for a 1 year follow up on a left breast mass noted on mammogram.  Her mammogram was normal.  She denies any lumps, pain, or nipple discharge.  Ultrasound was also unremarkable.  Stereotactic biopsy was benign.    Review of Systems   Constitutional: Negative for chills, fever and unexpected weight change.   HENT: Negative for hearing loss, trouble swallowing and voice change.    Eyes: Negative for visual disturbance.   Respiratory: Negative for apnea, cough, chest tightness, shortness of breath and wheezing.    Cardiovascular: Negative for chest pain, palpitations and leg swelling.   Gastrointestinal: Negative for abdominal distention, abdominal pain, anal bleeding, blood in stool, constipation, diarrhea, nausea, rectal pain and vomiting.   Endocrine: Negative for cold intolerance and heat intolerance.   Genitourinary: Negative for difficulty urinating, dysuria and flank pain.   Musculoskeletal: Negative for back pain and gait problem.   Skin: Negative for color change, rash and wound.   Neurological: Negative for dizziness, syncope, speech difficulty, weakness, light-headedness, numbness and headaches.   Hematological: Negative for adenopathy. Does not bruise/bleed easily.   Psychiatric/Behavioral: Negative for confusion. The patient is not nervous/anxious.        History:  Past Medical History:   Diagnosis Date   • Allergic rhinitis    • Arthritis    • Diabetes mellitus (CMS/HCC)    • Disease of thyroid gland    • GERD (gastroesophageal reflux disease)    • Hyperlipidemia    • Hypertension    • Rheumatic fever           Past Surgical History:   Procedure Laterality Date   • BREAST BIOPSY     • BREAST SURGERY     • CHOLECYSTECTOMY     •  KNEE SURGERY     • SHOULDER SURGERY     • TUBAL ABDOMINAL LIGATION     • WRIST SURGERY         Family History   Problem Relation Age of Onset   • Arthritis Other    • Thyroid disease Other    • Diabetes Other    • Obesity Other    • Kidney disease Other    • Liver disease Other    • No Known Problems Mother    • No Known Problems Father    • No Known Problems Sister    • No Known Problems Brother    • Breast cancer Maternal Aunt        Social History     Tobacco Use   • Smoking status: Current Some Day Smoker     Packs/day: 1.00     Types: Electronic Cigarette   • Smokeless tobacco: Never Used   • Tobacco comment: Pt stated that she does vapor cigarette   Substance Use Topics   • Alcohol use: No   • Drug use: No       Allergies:  Allergies   Allergen Reactions   • Codeine Anaphylaxis   • Nubain [Nalbuphine] Other (See Comments)     Excessive sedation       Medications:     Current Outpatient Medications:   •  aspirin (ASPIRIN EC LO-DOSE) 81 MG EC tablet, Take 1 tablet by mouth daily., Disp: , Rfl:   •  Blood Glucose Monitoring Suppl (D-CARE GLUCOMETER) w/Device kit, 1 unit marking on U-100 syringe 3 (Three) Times a Day As Needed (as needed)., Disp: 1 kit, Rfl: 0  •  Cholecalciferol (VITAMIN D3) 400 UNITS capsule, Take 1 tablet by mouth daily., Disp: , Rfl:   •  diclofenac (VOLTAREN) 75 MG EC tablet, Take 1 tablet by mouth Daily., Disp: 30 tablet, Rfl: 0  •  glucose blood test strip, Use as instructed, Disp: 100 each, Rfl: 12  •  Lancets misc, Use as directed, Disp: 100 each, Rfl: 12  •  levothyroxine (SYNTHROID, LEVOTHROID) 75 MCG tablet, 1 daily po, Disp: 30 tablet, Rfl: 2  •  lisinopril (PRINIVIL,ZESTRIL) 5 MG tablet, Take 1 tablet by mouth Daily., Disp: 90 tablet, Rfl: 3  •  lovastatin (MEVACOR) 40 MG tablet, Take 1 tablet by mouth Daily., Disp: 90 tablet, Rfl: 3  •  metFORMIN (GLUCOPHAGE) 1000 MG tablet, Take 1 tablet by mouth 2 (Two) Times a Day., Disp: 60 tablet, Rfl: 2    Objective     Vital Signs:   Vitals:  "   10/23/19 0945   BP: 124/64   Pulse: 86   Temp: 98.1 °F (36.7 °C)   TempSrc: Temporal   SpO2: 98%   Weight: 79.1 kg (174 lb 6.4 oz)   Height: 162.6 cm (64\")       Physical Exam:     General Appearance:    Alert, cooperative, in no acute distress   Head:    Normocephalic, without obvious abnormality, atraumatic   Eyes:            Lids and lashes normal, conjunctivae and sclerae normal, no   icterus, no pallor, corneas clear,   Ears:    Ears appear intact with no abnormalities noted   Throat:   No oral lesions, no thrush, oral mucosa moist   Breast:    No palpable mass in either breast or axilla   Lungs:     Clear to auscultation,respirations regular, even and                  Unlabored    Heart:    Regular rhythm and normal rate, no murmur, no gallop.   Chest Wall:    No abnormalities observed   Abdomen:     Normal bowel sounds, no masses, no organomegaly, soft        non-tender, non-distended, no guarding.   Extremities:   Moves all extremities well, no edema, no cyanosis, no             redness   Pulses:   Pulses palpable and equal bilaterally   Skin:   No bleeding, bruising or rash   Lymph nodes:   No palpable adenopathy   Neurologic:   Cranial nerves 2 - 12 grossly intact.           Results Review:   I reviewed the patient's new clinical results.      Assessment / Plan:    1. Abnormal mammogram        I did have a detailed and extensive discussion with the patient in the office today and reviewed her recent workup.  I have told the patient that repeat mammogram is now normal.  Recommend yearly mammograms and ultrasounds.    Electronically signed by Mariana Tim MD  10/23/19  10:24 AM    Portions of this note have been scribed for Mariana Tim MD by Michelle Milligan. 10/23/2019  10:24 AM                    "

## 2019-12-09 ENCOUNTER — OFFICE VISIT (OUTPATIENT)
Dept: INTERNAL MEDICINE | Facility: CLINIC | Age: 69
End: 2019-12-09

## 2019-12-09 VITALS
BODY MASS INDEX: 29.84 KG/M2 | TEMPERATURE: 97.3 F | WEIGHT: 174.8 LBS | HEART RATE: 83 BPM | HEIGHT: 64 IN | SYSTOLIC BLOOD PRESSURE: 116 MMHG | RESPIRATION RATE: 18 BRPM | OXYGEN SATURATION: 97 % | DIASTOLIC BLOOD PRESSURE: 70 MMHG

## 2019-12-09 DIAGNOSIS — J06.9 ACUTE URI: Primary | ICD-10-CM

## 2019-12-09 PROCEDURE — 99213 OFFICE O/P EST LOW 20 MIN: CPT | Performed by: INTERNAL MEDICINE

## 2019-12-09 RX ORDER — BENZONATATE 200 MG/1
200 CAPSULE ORAL 3 TIMES DAILY PRN
Qty: 30 CAPSULE | Refills: 1 | Status: SHIPPED | OUTPATIENT
Start: 2019-12-09 | End: 2020-12-08

## 2019-12-09 RX ORDER — SULFAMETHOXAZOLE AND TRIMETHOPRIM 800; 160 MG/1; MG/1
1 TABLET ORAL 2 TIMES DAILY
Qty: 14 TABLET | Refills: 0 | Status: SHIPPED | OUTPATIENT
Start: 2019-12-09 | End: 2020-12-08

## 2019-12-09 NOTE — PROGRESS NOTES
Subjective   Reema Brothers is a 69 y.o. female.     Chief Complaint   Patient presents with   • URI     coughing up green mucous, onset sunday morning    • Nasal Congestion   • Cough       History of Present Illness   3 to 4 days runny nose or sinus congestion sinus pain sore throat cough worse at nighttime green sputum no fever chills no wheezing no short of breath over-the-counter medicine no help    Current Outpatient Medications:   •  aspirin (ASPIRIN EC LO-DOSE) 81 MG EC tablet, Take 1 tablet by mouth daily., Disp: , Rfl:   •  Blood Glucose Monitoring Suppl (D-CARE GLUCOMETER) w/Device kit, 1 unit marking on U-100 syringe 3 (Three) Times a Day As Needed (as needed)., Disp: 1 kit, Rfl: 0  •  Cholecalciferol (VITAMIN D3) 400 UNITS capsule, Take 1 tablet by mouth daily., Disp: , Rfl:   •  diclofenac (VOLTAREN) 75 MG EC tablet, Take 1 tablet by mouth Daily., Disp: 30 tablet, Rfl: 0  •  glucose blood test strip, Use as instructed, Disp: 100 each, Rfl: 12  •  Lancets misc, Use as directed, Disp: 100 each, Rfl: 12  •  levothyroxine (SYNTHROID, LEVOTHROID) 75 MCG tablet, 1 daily po, Disp: 30 tablet, Rfl: 2  •  lisinopril (PRINIVIL,ZESTRIL) 5 MG tablet, Take 1 tablet by mouth Daily., Disp: 90 tablet, Rfl: 3  •  lovastatin (MEVACOR) 40 MG tablet, Take 1 tablet by mouth Daily., Disp: 90 tablet, Rfl: 3  •  metFORMIN (GLUCOPHAGE) 1000 MG tablet, Take 1 tablet by mouth 2 (Two) Times a Day., Disp: 60 tablet, Rfl: 2  •  benzonatate (TESSALON) 200 MG capsule, Take 1 capsule by mouth 3 (Three) Times a Day As Needed for Cough., Disp: 30 capsule, Rfl: 1  •  dextromethorphan 15 MG/5ML syrup, Take 10 mL by mouth 4 (Four) Times a Day As Needed for Cough., Disp: 118 mL, Rfl: 0  •  sulfamethoxazole-trimethoprim (BACTRIM DS) 800-160 MG per tablet, Take 1 tablet by mouth 2 (Two) Times a Day., Disp: 14 tablet, Rfl: 0    The following portions of the patient's history were reviewed and updated as appropriate: allergies, current  medications, past family history, past medical history, past social history, past surgical history and problem list.    Review of Systems   Constitutional: Negative.    HENT: Positive for congestion, postnasal drip, rhinorrhea, sinus pressure, sinus pain and sore throat.    Respiratory: Positive for cough. Negative for shortness of breath and wheezing.    Cardiovascular: Negative.    Gastrointestinal: Negative.    Musculoskeletal: Negative.    Skin: Negative.    Neurological: Negative.    Psychiatric/Behavioral: Negative.        Objective   Physical Exam   Constitutional: She is oriented to person, place, and time. She appears well-nourished.   HENT:   Head: Normocephalic and atraumatic.   Erythematous throat and a pharyngeal Ulcer   Neck: Neck supple.   Cardiovascular: Normal rate, regular rhythm and normal heart sounds.   Pulmonary/Chest: Effort normal and breath sounds normal.   Abdominal: Bowel sounds are normal.   Neurological: She is alert and oriented to person, place, and time.   Skin: Skin is warm.   Psychiatric: She has a normal mood and affect.       All tests have been reviewed.    Assessment/Plan   Diagnoses and all orders for this visit:    Acute URI    Other orders  -     sulfamethoxazole-trimethoprim (BACTRIM DS) 800-160 MG per tablet; Take 1 tablet by mouth 2 (Two) Times a Day.  -     benzonatate (TESSALON) 200 MG capsule; Take 1 capsule by mouth 3 (Three) Times a Day As Needed for Cough.  -     dextromethorphan 15 MG/5ML syrup; Take 10 mL by mouth 4 (Four) Times a Day As Needed for Cough.        Salt water gargle, Advil gelcap, Allegra-DCall if no better

## 2020-01-15 RX ORDER — LEVOTHYROXINE SODIUM 0.07 MG/1
TABLET ORAL
Qty: 90 TABLET | Refills: 0 | Status: SHIPPED | OUTPATIENT
Start: 2020-01-15 | End: 2020-04-14

## 2020-04-14 RX ORDER — LEVOTHYROXINE SODIUM 0.07 MG/1
TABLET ORAL
Qty: 90 TABLET | Refills: 1 | Status: SHIPPED | OUTPATIENT
Start: 2020-04-14 | End: 2020-10-12

## 2020-08-28 ENCOUNTER — FLU SHOT (OUTPATIENT)
Dept: INTERNAL MEDICINE | Facility: CLINIC | Age: 70
End: 2020-08-28

## 2020-08-28 DIAGNOSIS — Z23 NEED FOR INFLUENZA VACCINATION: ICD-10-CM

## 2020-08-28 PROCEDURE — 90471 IMMUNIZATION ADMIN: CPT | Performed by: INTERNAL MEDICINE

## 2020-08-28 PROCEDURE — 90653 IIV ADJUVANT VACCINE IM: CPT | Performed by: INTERNAL MEDICINE

## 2020-10-12 RX ORDER — LEVOTHYROXINE SODIUM 0.07 MG/1
TABLET ORAL
Qty: 90 TABLET | Refills: 0 | Status: SHIPPED | OUTPATIENT
Start: 2020-10-12 | End: 2020-11-23 | Stop reason: SDUPTHER

## 2020-10-28 ENCOUNTER — TRANSCRIBE ORDERS (OUTPATIENT)
Dept: ADMINISTRATIVE | Facility: HOSPITAL | Age: 70
End: 2020-10-28

## 2020-10-28 DIAGNOSIS — Z12.31 VISIT FOR SCREENING MAMMOGRAM: Primary | ICD-10-CM

## 2020-10-30 ENCOUNTER — HOSPITAL ENCOUNTER (OUTPATIENT)
Dept: MAMMOGRAPHY | Facility: HOSPITAL | Age: 70
Discharge: HOME | End: 2020-10-30
Admitting: INTERNAL MEDICINE

## 2020-10-30 DIAGNOSIS — Z12.31 VISIT FOR SCREENING MAMMOGRAM: ICD-10-CM

## 2020-10-30 PROCEDURE — 77063 BREAST TOMOSYNTHESIS BI: CPT

## 2020-10-30 PROCEDURE — 77067 SCR MAMMO BI INCL CAD: CPT

## 2020-11-23 RX ORDER — LOVASTATIN 40 MG/1
TABLET ORAL
Qty: 30 TABLET | Refills: 0 | Status: SHIPPED | OUTPATIENT
Start: 2020-11-23 | End: 2020-12-08 | Stop reason: SDUPTHER

## 2020-11-23 RX ORDER — LEVOTHYROXINE SODIUM 0.07 MG/1
75 TABLET ORAL DAILY
Qty: 30 TABLET | Refills: 0 | Status: SHIPPED | OUTPATIENT
Start: 2020-11-23 | End: 2020-12-08 | Stop reason: SDUPTHER

## 2020-11-23 NOTE — TELEPHONE ENCOUNTER
Caller: Raj Brothers    Relationship:      Best call back number:  [phone]  Medication needed:   Requested Prescriptions     Pending Prescriptions Disp Refills   • lovastatin (MEVACOR) 40 MG tablet [Pharmacy Med Name: Lovastatin 40 MG Oral Tablet] 90 tablet 0     Sig: Take 1 tablet by mouth once daily   • metFORMIN (GLUCOPHAGE) 1000 MG tablet 90 tablet 3     Sig: Take 1 tablet by mouth 2 (Two) Times a Day.   • levothyroxine (SYNTHROID, LEVOTHROID) 75 MCG tablet 90 tablet 0     Sig: Take 1 tablet by mouth Daily.        What details did the patient provide when requesting the medication: PATIENT IS OUT OF METFORMIN      Does the patient have less than a 3 day supply:  [x] Yes  [] No    What is the patient's preferred pharmacy: [address] [phone] [facility] [phone] [facility]

## 2020-12-08 ENCOUNTER — OFFICE VISIT (OUTPATIENT)
Dept: INTERNAL MEDICINE | Facility: CLINIC | Age: 70
End: 2020-12-08

## 2020-12-08 VITALS
WEIGHT: 177 LBS | TEMPERATURE: 97.7 F | BODY MASS INDEX: 30.22 KG/M2 | HEART RATE: 76 BPM | SYSTOLIC BLOOD PRESSURE: 132 MMHG | OXYGEN SATURATION: 98 % | DIASTOLIC BLOOD PRESSURE: 82 MMHG | HEIGHT: 64 IN

## 2020-12-08 DIAGNOSIS — E78.5 DYSLIPIDEMIA: ICD-10-CM

## 2020-12-08 DIAGNOSIS — E03.8 OTHER SPECIFIED HYPOTHYROIDISM: ICD-10-CM

## 2020-12-08 DIAGNOSIS — I10 ESSENTIAL HYPERTENSION: Primary | ICD-10-CM

## 2020-12-08 DIAGNOSIS — F41.9 ANXIETY: ICD-10-CM

## 2020-12-08 DIAGNOSIS — E55.9 VITAMIN D DEFICIENCY: ICD-10-CM

## 2020-12-08 DIAGNOSIS — K21.9 GASTROESOPHAGEAL REFLUX DISEASE WITHOUT ESOPHAGITIS: ICD-10-CM

## 2020-12-08 DIAGNOSIS — G47.00 INSOMNIA, UNSPECIFIED TYPE: ICD-10-CM

## 2020-12-08 DIAGNOSIS — E11.69 TYPE 2 DIABETES MELLITUS WITH OTHER SPECIFIED COMPLICATION, WITHOUT LONG-TERM CURRENT USE OF INSULIN (HCC): ICD-10-CM

## 2020-12-08 PROBLEM — J06.9 ACUTE URI: Status: RESOLVED | Noted: 2019-12-09 | Resolved: 2020-12-08

## 2020-12-08 PROCEDURE — 99214 OFFICE O/P EST MOD 30 MIN: CPT | Performed by: INTERNAL MEDICINE

## 2020-12-08 PROCEDURE — 90732 PPSV23 VACC 2 YRS+ SUBQ/IM: CPT | Performed by: INTERNAL MEDICINE

## 2020-12-08 PROCEDURE — 90471 IMMUNIZATION ADMIN: CPT | Performed by: INTERNAL MEDICINE

## 2020-12-08 RX ORDER — LEVOTHYROXINE SODIUM 0.07 MG/1
75 TABLET ORAL DAILY
Qty: 90 TABLET | Refills: 3 | Status: SHIPPED | OUTPATIENT
Start: 2020-12-08 | End: 2021-09-14 | Stop reason: SDUPTHER

## 2020-12-08 RX ORDER — GLIMEPIRIDE 2 MG/1
TABLET ORAL
Qty: 30 TABLET | Refills: 1 | Status: SHIPPED | OUTPATIENT
Start: 2020-12-08 | End: 2021-02-01

## 2020-12-08 RX ORDER — LOVASTATIN 40 MG/1
40 TABLET ORAL DAILY
Qty: 90 TABLET | Refills: 3 | Status: SHIPPED | OUTPATIENT
Start: 2020-12-08 | End: 2021-12-08

## 2020-12-08 RX ORDER — GLIMEPIRIDE 4 MG/1
4 TABLET ORAL
Qty: 30 TABLET | Refills: 1 | Status: SHIPPED | OUTPATIENT
Start: 2020-12-08 | End: 2020-12-08 | Stop reason: SDUPTHER

## 2020-12-08 RX ORDER — MIRTAZAPINE 15 MG/1
15 TABLET, FILM COATED ORAL NIGHTLY
Qty: 30 TABLET | Refills: 1 | Status: SHIPPED | OUTPATIENT
Start: 2020-12-08 | End: 2021-01-07

## 2020-12-08 NOTE — PROGRESS NOTES
Subjective   Reema Brothers is a 70 y.o. female.     Chief Complaint   Patient presents with   • Hypertension     6 mo f/u    • Diabetes       History of Present Illness   Patient here for follow-up.  Patient states morning sugar elevated average 180.  Patient also complains stress in life recently family member passed away.  Unable to sleep over-the-counter medicine no help.  Blood pressure stable now.  Hypothyroidism need a refill on medication.  Dyslipidemia need a refill medication.  Insomnia.    Current Outpatient Medications:   •  Blood Glucose Monitoring Suppl (D-CARE GLUCOMETER) w/Device kit, 1 unit marking on U-100 syringe 3 (Three) Times a Day As Needed (as needed)., Disp: 1 kit, Rfl: 0  •  Cholecalciferol (VITAMIN D3) 400 UNITS capsule, Take 1 tablet by mouth daily., Disp: , Rfl:   •  diclofenac (VOLTAREN) 75 MG EC tablet, Take 1 tablet by mouth Daily., Disp: 30 tablet, Rfl: 0  •  glucose blood test strip, Use as instructed, Disp: 100 each, Rfl: 12  •  Lancets misc, Use as directed, Disp: 100 each, Rfl: 12  •  levothyroxine (SYNTHROID, LEVOTHROID) 75 MCG tablet, Take 1 tablet by mouth Daily., Disp: 90 tablet, Rfl: 3  •  lisinopril (PRINIVIL,ZESTRIL) 5 MG tablet, Take 1 tablet by mouth Daily., Disp: 90 tablet, Rfl: 3  •  lovastatin (MEVACOR) 40 MG tablet, Take 1 tablet by mouth Daily., Disp: 90 tablet, Rfl: 3  •  metFORMIN (GLUCOPHAGE) 1000 MG tablet, Take 1 tablet by mouth 2 (Two) Times a Day., Disp: 180 tablet, Rfl: 3  •  glimepiride (AMARYL) 2 MG tablet, 1 daily po, Disp: 30 tablet, Rfl: 1  •  mirtazapine (Remeron) 15 MG tablet, Take 1 tablet by mouth Every Night., Disp: 30 tablet, Rfl: 1    The following portions of the patient's history were reviewed and updated as appropriate: allergies, current medications, past family history, past medical history, past social history, past surgical history and problem list.    Review of Systems   Constitutional: Negative.    Respiratory: Negative.     Cardiovascular: Negative.    Gastrointestinal: Negative.    Musculoskeletal: Negative.    Skin: Negative.    Neurological: Negative.    Psychiatric/Behavioral: Negative.        Objective   Physical Exam  Neck:      Musculoskeletal: Neck supple.   Cardiovascular:      Rate and Rhythm: Normal rate and regular rhythm.      Heart sounds: Normal heart sounds.   Pulmonary:      Effort: Pulmonary effort is normal.      Breath sounds: Normal breath sounds.   Abdominal:      General: Bowel sounds are normal.   Skin:     General: Skin is warm.   Neurological:      Mental Status: She is alert and oriented to person, place, and time.         All tests have been reviewed.    Assessment/Plan   Diagnoses and all orders for this visit:    Essential hypertension continue medication refill    Vitamin D deficiency continue supplement    Gastroesophageal reflux disease without esophagitis continue medication    Type 2 diabetes mellitus with other specified complication, without long-term current use of insulin (CMS/[facility]) add glimepiride 2 mg daily encourage patient to have good diet and exercise  -     metFORMIN (GLUCOPHAGE) 1000 MG tablet; Take 1 tablet by mouth 2 (Two) Times a Day.  -     Discontinue: glimepiride (Amaryl) 4 MG tablet; Take 1 tablet by mouth Every Morning Before Breakfast.  -     glimepiride (AMARYL) 2 MG tablet; 1 daily po    Other specified hypothyroidism refill medication  -     levothyroxine (SYNTHROID, LEVOTHROID) 75 MCG tablet; Take 1 tablet by mouth Daily.    Dyslipidemia refill medication  -     lovastatin (MEVACOR) 40 MG tablet; Take 1 tablet by mouth Daily.    Anxiety initiate mirtazapine  -     mirtazapine (Remeron) 15 MG tablet; Take 1 tablet by mouth Every Night.    Insomnia, unspecified type  -     mirtazapine (Remeron) 15 MG tablet; Take 1 tablet by mouth Every Night.    Other orders  -     Pneumococcal Polysaccharide Vaccine 23-Valent (PPSV23) Greater Than or Equal To 3yo Subcutaneous / IM      3  weeks follow-up after him labs.

## 2020-12-21 RX ORDER — LISINOPRIL 5 MG/1
TABLET ORAL
Qty: 90 TABLET | Refills: 3 | Status: SHIPPED | OUTPATIENT
Start: 2020-12-21 | End: 2021-12-20

## 2021-01-04 DIAGNOSIS — E11.43 TYPE 2 DIABETES MELLITUS WITH AUTONOMIC NEUROPATHY, UNSPECIFIED WHETHER LONG TERM INSULIN USE (HCC): Primary | ICD-10-CM

## 2021-01-06 LAB
ALBUMIN SERPL-MCNC: 4.8 G/DL (ref 3.5–5.2)
ALBUMIN/GLOB SERPL: 2.2 G/DL
ALP SERPL-CCNC: 53 U/L (ref 39–117)
ALT SERPL-CCNC: 18 U/L (ref 1–33)
AST SERPL-CCNC: 17 U/L (ref 1–32)
BASOPHILS # BLD AUTO: 0.07 10*3/MM3 (ref 0–0.2)
BASOPHILS NFR BLD AUTO: 0.7 % (ref 0–1.5)
BILIRUB SERPL-MCNC: 0.4 MG/DL (ref 0–1.2)
BUN SERPL-MCNC: 10 MG/DL (ref 8–23)
BUN/CREAT SERPL: 11.2 (ref 7–25)
CALCIUM SERPL-MCNC: 9.6 MG/DL (ref 8.6–10.5)
CHLORIDE SERPL-SCNC: 107 MMOL/L (ref 98–107)
CO2 SERPL-SCNC: 24.5 MMOL/L (ref 22–29)
CREAT SERPL-MCNC: 0.89 MG/DL (ref 0.57–1)
EOSINOPHIL # BLD AUTO: 0.07 10*3/MM3 (ref 0–0.4)
EOSINOPHIL NFR BLD AUTO: 0.7 % (ref 0.3–6.2)
ERYTHROCYTE [DISTWIDTH] IN BLOOD BY AUTOMATED COUNT: 12.4 % (ref 12.3–15.4)
GLOBULIN SER CALC-MCNC: 2.2 GM/DL
GLUCOSE SERPL-MCNC: 146 MG/DL (ref 65–99)
HBA1C MFR BLD: 7.3 % (ref 4.8–5.6)
HCT VFR BLD AUTO: 38.3 % (ref 34–46.6)
HGB BLD-MCNC: 12.8 G/DL (ref 12–15.9)
IMM GRANULOCYTES # BLD AUTO: 0.04 10*3/MM3 (ref 0–0.05)
IMM GRANULOCYTES NFR BLD AUTO: 0.4 % (ref 0–0.5)
LYMPHOCYTES # BLD AUTO: 3.36 10*3/MM3 (ref 0.7–3.1)
LYMPHOCYTES NFR BLD AUTO: 34.7 % (ref 19.6–45.3)
MCH RBC QN AUTO: 31.8 PG (ref 26.6–33)
MCHC RBC AUTO-ENTMCNC: 33.4 G/DL (ref 31.5–35.7)
MCV RBC AUTO: 95 FL (ref 79–97)
MONOCYTES # BLD AUTO: 0.72 10*3/MM3 (ref 0.1–0.9)
MONOCYTES NFR BLD AUTO: 7.4 % (ref 5–12)
NEUTROPHILS # BLD AUTO: 5.42 10*3/MM3 (ref 1.7–7)
NEUTROPHILS NFR BLD AUTO: 56.1 % (ref 42.7–76)
NRBC BLD AUTO-RTO: 0 /100 WBC (ref 0–0.2)
PLATELET # BLD AUTO: 288 10*3/MM3 (ref 140–450)
POTASSIUM SERPL-SCNC: 4.5 MMOL/L (ref 3.5–5.2)
PROT SERPL-MCNC: 7 G/DL (ref 6–8.5)
RBC # BLD AUTO: 4.03 10*6/MM3 (ref 3.77–5.28)
SODIUM SERPL-SCNC: 144 MMOL/L (ref 136–145)
WBC # BLD AUTO: 9.68 10*3/MM3 (ref 3.4–10.8)

## 2021-01-07 ENCOUNTER — OFFICE VISIT (OUTPATIENT)
Dept: INTERNAL MEDICINE | Facility: CLINIC | Age: 71
End: 2021-01-07

## 2021-01-07 VITALS
OXYGEN SATURATION: 99 % | WEIGHT: 176.12 LBS | BODY MASS INDEX: 30.07 KG/M2 | TEMPERATURE: 97.7 F | HEIGHT: 64 IN | DIASTOLIC BLOOD PRESSURE: 72 MMHG | HEART RATE: 75 BPM | SYSTOLIC BLOOD PRESSURE: 124 MMHG

## 2021-01-07 DIAGNOSIS — F41.9 ANXIETY: ICD-10-CM

## 2021-01-07 DIAGNOSIS — E55.9 VITAMIN D DEFICIENCY: ICD-10-CM

## 2021-01-07 DIAGNOSIS — J32.9 SINUSITIS, UNSPECIFIED CHRONICITY, UNSPECIFIED LOCATION: ICD-10-CM

## 2021-01-07 DIAGNOSIS — I10 ESSENTIAL HYPERTENSION: Primary | ICD-10-CM

## 2021-01-07 DIAGNOSIS — E78.5 DYSLIPIDEMIA: ICD-10-CM

## 2021-01-07 DIAGNOSIS — E11.69 TYPE 2 DIABETES MELLITUS WITH OTHER SPECIFIED COMPLICATION, WITHOUT LONG-TERM CURRENT USE OF INSULIN (HCC): ICD-10-CM

## 2021-01-07 DIAGNOSIS — F43.21 GRIEVING: ICD-10-CM

## 2021-01-07 DIAGNOSIS — E03.8 OTHER SPECIFIED HYPOTHYROIDISM: ICD-10-CM

## 2021-01-07 DIAGNOSIS — G47.00 INSOMNIA, UNSPECIFIED TYPE: ICD-10-CM

## 2021-01-07 PROCEDURE — 99214 OFFICE O/P EST MOD 30 MIN: CPT | Performed by: INTERNAL MEDICINE

## 2021-01-07 RX ORDER — TRAZODONE HYDROCHLORIDE 50 MG/1
50 TABLET ORAL NIGHTLY
Qty: 30 TABLET | Refills: 1 | Status: SHIPPED | OUTPATIENT
Start: 2021-01-07 | End: 2021-03-12 | Stop reason: SDUPTHER

## 2021-01-07 NOTE — PROGRESS NOTES
Subjective   Reema Brothers is a 70 y.o. female.     Chief Complaint   Patient presents with   • Labs Only     pt to f/u on labwork today        History of Present Illness   Patient here for follow-up of.  Hypertension stable on medication.  The diabetes is stable on medication.  Hypothyroidism stable on medication.  Dyslipidemia stable.  Anxiety mild mainly due to grieving over her sister's death.  Patient still has sleeping problem.  Remeron did not help causing jerking in the legs.  Mild sinus congestion.    Current Outpatient Medications:   •  Blood Glucose Monitoring Suppl (D-CARE GLUCOMETER) w/Device kit, 1 unit marking on U-100 syringe 3 (Three) Times a Day As Needed (as needed)., Disp: 1 kit, Rfl: 0  •  Cholecalciferol (VITAMIN D3) 400 UNITS capsule, Take 1 tablet by mouth daily., Disp: , Rfl:   •  diclofenac (VOLTAREN) 75 MG EC tablet, Take 1 tablet by mouth Daily., Disp: 30 tablet, Rfl: 0  •  glimepiride (AMARYL) 2 MG tablet, 1 daily po, Disp: 30 tablet, Rfl: 1  •  glucose blood test strip, Use as instructed, Disp: 100 each, Rfl: 12  •  Lancets misc, Use as directed, Disp: 100 each, Rfl: 12  •  levothyroxine (SYNTHROID, LEVOTHROID) 75 MCG tablet, Take 1 tablet by mouth Daily., Disp: 90 tablet, Rfl: 3  •  lisinopril (PRINIVIL,ZESTRIL) 5 MG tablet, Take 1 tablet by mouth once daily, Disp: 90 tablet, Rfl: 3  •  lovastatin (MEVACOR) 40 MG tablet, Take 1 tablet by mouth Daily., Disp: 90 tablet, Rfl: 3  •  metFORMIN (GLUCOPHAGE) 1000 MG tablet, Take 1 tablet by mouth 2 (Two) Times a Day., Disp: 180 tablet, Rfl: 3  •  traZODone (DESYREL) 50 MG tablet, Take 1 tablet by mouth Every Night., Disp: 30 tablet, Rfl: 1    The following portions of the patient's history were reviewed and updated as appropriate: allergies, current medications, past family history, past medical history, past social history, past surgical history and problem list.    Review of Systems   Constitutional: Negative.    Respiratory: Negative.     Cardiovascular: Negative.    Gastrointestinal: Negative.    Musculoskeletal: Negative.    Skin: Negative.    Neurological: Negative.    Psychiatric/Behavioral: Positive for sleep disturbance. The patient is nervous/anxious.        Objective   Physical Exam  Neck:      Musculoskeletal: Neck supple.   Cardiovascular:      Rate and Rhythm: Normal rate and regular rhythm.      Heart sounds: Normal heart sounds.   Pulmonary:      Effort: Pulmonary effort is normal.      Breath sounds: Normal breath sounds.   Abdominal:      General: Bowel sounds are normal.   Skin:     General: Skin is warm.   Neurological:      Mental Status: She is alert and oriented to person, place, and time.         All tests have been reviewed.    Assessment/Plan   Diagnoses and all orders for this visit:    Essential hypertension continue medication    Type 2 diabetes mellitus with other specified complication, without long-term current use of insulin (CMS/[facility]) continue medication  -     MicroAlbumin, Urine, Random - Urine, Clean Catch    Other specified hypothyroidism continue medication do blood tests  -     TSH    Dyslipidemia check lab  -     Lipid Panel  -     CK    Anxiety mainly due to grieving continue to watch for now no depression no suicidal thoughts    Insomnia, unspecified type initiate medication  -     traZODone (DESYREL) 50 MG tablet; Take 1 tablet by mouth Every Night.    Sinusitis, unspecified chronicity, unspecified location over-the-counter medicine for now    Grieving counseling    Vitamin D deficiency  -     Vitamin D 25 Hydroxy      1 mo PE with others and labs

## 2021-01-30 DIAGNOSIS — E11.69 TYPE 2 DIABETES MELLITUS WITH OTHER SPECIFIED COMPLICATION, WITHOUT LONG-TERM CURRENT USE OF INSULIN (HCC): ICD-10-CM

## 2021-02-01 RX ORDER — GLIMEPIRIDE 2 MG/1
TABLET ORAL
Qty: 90 TABLET | Refills: 3 | Status: SHIPPED | OUTPATIENT
Start: 2021-02-01 | End: 2021-02-02 | Stop reason: SDUPTHER

## 2021-02-02 DIAGNOSIS — E11.69 TYPE 2 DIABETES MELLITUS WITH OTHER SPECIFIED COMPLICATION, WITHOUT LONG-TERM CURRENT USE OF INSULIN (HCC): ICD-10-CM

## 2021-02-03 RX ORDER — GLIMEPIRIDE 2 MG/1
TABLET ORAL
Qty: 90 TABLET | Refills: 3 | Status: SHIPPED | OUTPATIENT
Start: 2021-02-03 | End: 2022-01-20

## 2021-03-12 DIAGNOSIS — G47.00 INSOMNIA, UNSPECIFIED TYPE: ICD-10-CM

## 2021-03-12 RX ORDER — TRAZODONE HYDROCHLORIDE 50 MG/1
50 TABLET ORAL NIGHTLY
Qty: 90 TABLET | Refills: 3 | Status: SHIPPED | OUTPATIENT
Start: 2021-03-12

## 2021-06-01 ENCOUNTER — OFFICE VISIT (OUTPATIENT)
Dept: INTERNAL MEDICINE | Facility: CLINIC | Age: 71
End: 2021-06-01

## 2021-06-01 VITALS
BODY MASS INDEX: 31.92 KG/M2 | TEMPERATURE: 97.1 F | DIASTOLIC BLOOD PRESSURE: 84 MMHG | HEIGHT: 64 IN | HEART RATE: 98 BPM | SYSTOLIC BLOOD PRESSURE: 130 MMHG | WEIGHT: 187 LBS | OXYGEN SATURATION: 98 %

## 2021-06-01 DIAGNOSIS — J32.9 SINUSITIS, UNSPECIFIED CHRONICITY, UNSPECIFIED LOCATION: Primary | ICD-10-CM

## 2021-06-01 DIAGNOSIS — J39.2 THROAT MASS: ICD-10-CM

## 2021-06-01 PROCEDURE — 99214 OFFICE O/P EST MOD 30 MIN: CPT | Performed by: INTERNAL MEDICINE

## 2021-06-01 RX ORDER — AMOXICILLIN AND CLAVULANATE POTASSIUM 875; 125 MG/1; MG/1
1 TABLET, FILM COATED ORAL 2 TIMES DAILY
Qty: 14 TABLET | Refills: 0 | Status: SHIPPED | OUTPATIENT
Start: 2021-06-01 | End: 2021-09-14

## 2021-06-01 NOTE — PROGRESS NOTES
Subjective   Reema Brothers is a 71 y.o. female.     Chief Complaint   Patient presents with   • Oral Swelling     month; denies pain, fever; recent URI       History of Present Illness   4 week feeling like foreign body sensation , no swallow problem. No fever or chill, +sinus congestion and running nose, right facial sinus pain , yellow drainage. No ear pain, no cough.     Current Outpatient Medications:   •  Blood Glucose Monitoring Suppl (D-CARE GLUCOMETER) w/Device kit, 1 unit marking on U-100 syringe 3 (Three) Times a Day As Needed (as needed)., Disp: 1 kit, Rfl: 0  •  Cholecalciferol (VITAMIN D3) 400 UNITS capsule, Take 1 tablet by mouth daily., Disp: , Rfl:   •  diclofenac (VOLTAREN) 75 MG EC tablet, Take 1 tablet by mouth Daily., Disp: 30 tablet, Rfl: 0  •  glimepiride (AMARYL) 2 MG tablet, Take 1 tablet by mouth once daily, Disp: 90 tablet, Rfl: 3  •  glucose blood test strip, Use as instructed, Disp: 100 each, Rfl: 12  •  Lancets misc, Use as directed, Disp: 100 each, Rfl: 12  •  levothyroxine (SYNTHROID, LEVOTHROID) 75 MCG tablet, Take 1 tablet by mouth Daily., Disp: 90 tablet, Rfl: 3  •  lisinopril (PRINIVIL,ZESTRIL) 5 MG tablet, Take 1 tablet by mouth once daily, Disp: 90 tablet, Rfl: 3  •  lovastatin (MEVACOR) 40 MG tablet, Take 1 tablet by mouth Daily., Disp: 90 tablet, Rfl: 3  •  metFORMIN (GLUCOPHAGE) 1000 MG tablet, Take 1 tablet by mouth 2 (Two) Times a Day., Disp: 180 tablet, Rfl: 3  •  traZODone (DESYREL) 50 MG tablet, Take 1 tablet by mouth Every Night., Disp: 90 tablet, Rfl: 3  •  amoxicillin-clavulanate (Augmentin) 875-125 MG per tablet, Take 1 tablet by mouth 2 (Two) Times a Day., Disp: 14 tablet, Rfl: 0    The following portions of the patient's history were reviewed and updated as appropriate: allergies, current medications, past family history, past medical history, past social history, past surgical history and problem list.    Review of Systems   Constitutional: Negative.    HENT:  Positive for congestion.         Throat foreign body sensation   Respiratory: Negative.    Cardiovascular: Negative.    Gastrointestinal: Negative.    Musculoskeletal: Negative.    Skin: Negative.    Neurological: Negative.    Psychiatric/Behavioral: Negative.        Objective   Physical Exam  HENT:      Head: Normocephalic and atraumatic.   Cardiovascular:      Rate and Rhythm: Normal rate and regular rhythm.      Heart sounds: Normal heart sounds.   Pulmonary:      Effort: Pulmonary effort is normal.      Breath sounds: Normal breath sounds.   Abdominal:      General: Bowel sounds are normal.   Musculoskeletal:      Cervical back: Neck supple.   Skin:     General: Skin is warm.   Neurological:      Mental Status: She is alert and oriented to person, place, and time.         All tests have been reviewed.    Assessment/Plan   Diagnoses and all orders for this visit:    Sinusitis, unspecified chronicity, unspecified location  -     amoxicillin-clavulanate (Augmentin) 875-125 MG per tablet; Take 1 tablet by mouth 2 (Two) Times a Day.    zyrtec and mucinex and advil    Call if no better

## 2021-06-07 ENCOUNTER — TELEPHONE (OUTPATIENT)
Dept: INTERNAL MEDICINE | Facility: CLINIC | Age: 71
End: 2021-06-07

## 2021-06-07 RX ORDER — FLUCONAZOLE 150 MG/1
150 TABLET ORAL ONCE
Qty: 1 TABLET | Refills: 0 | Status: SHIPPED | OUTPATIENT
Start: 2021-06-07 | End: 2021-06-07

## 2021-06-07 NOTE — TELEPHONE ENCOUNTER
Caller: Reema Brothers    Relationship: Self    Best call back number:[phone]    What medication are you requesting:   ANTIBIOTIC    What are your current symptoms:   YEAST INFECTION     How long have you been experiencing symptoms: A WEEK     Have you had these symptoms before:    [x] Yes  [] No    Have you been treated for these symptoms before:   [x] Yes  [] No    If a prescription is needed, what is your preferred pharmacy and phone number:      [facility] [address] [place] [address] [phone] [facility] [phone]   [phone]

## 2021-06-07 NOTE — TELEPHONE ENCOUNTER
Patient was seen 06/01/2021 and given Augmentin. Please advise and prescribe medication to help with yeast infection if appropriate.

## 2021-06-08 NOTE — TELEPHONE ENCOUNTER
Attempted to contact patient to notify that additional medication had been sent to local pharmacy; every time a man answered and stated it was the wrong number.     HUB may deliver message if patient returns call;    Medication for yeast infection has been sent to local pharmacy

## 2021-06-29 ENCOUNTER — OFFICE VISIT (OUTPATIENT)
Dept: SURGERY | Facility: CLINIC | Age: 71
End: 2021-06-29

## 2021-06-29 VITALS
OXYGEN SATURATION: 98 % | WEIGHT: 185 LBS | SYSTOLIC BLOOD PRESSURE: 140 MMHG | HEART RATE: 98 BPM | HEIGHT: 64 IN | TEMPERATURE: 97.5 F | BODY MASS INDEX: 31.58 KG/M2 | DIASTOLIC BLOOD PRESSURE: 80 MMHG

## 2021-06-29 DIAGNOSIS — Z01.818 PRE-OP TESTING: Primary | ICD-10-CM

## 2021-06-29 DIAGNOSIS — R13.12 OROPHARYNGEAL DYSPHAGIA: Primary | ICD-10-CM

## 2021-06-29 PROCEDURE — 99244 OFF/OP CNSLTJ NEW/EST MOD 40: CPT | Performed by: SURGERY

## 2021-06-29 NOTE — PROGRESS NOTES
Patient: Reema Brothers    YOB: 1950    Date: 06/29/2021    Primary Care Provider: Mikey Hyde MD    Chief Complaint   Patient presents with   • Difficulty Swallowing       SUBJECTIVE:    History of present illness:  I saw the patient in the office today as a consultation for evaluation and treatment of difficulty swallowing. She states has been going on for several months. When she eats it feels like its getting stuck. She states she has heartburn.    This does cause her discomfort, she states the feeling of food getting stuck just at the sternal notch.  Apparently this been present over several months, she does have a history significant for epigastric discomfort and heartburn.    The following portions of the patient's history were reviewed and updated as appropriate: allergies, current medications, past family history, past medical history, past social history, past surgical history and problem list.    Review of Systems   Constitutional: Negative for chills, fever and unexpected weight change.   HENT: Positive for trouble swallowing. Negative for hearing loss and voice change.    Eyes: Negative for visual disturbance.   Respiratory: Negative for apnea, cough, chest tightness, shortness of breath and wheezing.    Cardiovascular: Negative for chest pain, palpitations and leg swelling.   Gastrointestinal: Negative for abdominal distention, abdominal pain, anal bleeding, blood in stool, constipation, diarrhea, nausea, rectal pain and vomiting.   Endocrine: Negative for cold intolerance and heat intolerance.   Genitourinary: Negative for difficulty urinating, dysuria and flank pain.   Musculoskeletal: Negative for back pain and gait problem.   Skin: Negative for color change, rash and wound.   Neurological: Negative for dizziness, syncope, speech difficulty, weakness, light-headedness, numbness and headaches.   Hematological: Negative for adenopathy. Does not bruise/bleed easily.    Psychiatric/Behavioral: Negative for confusion. The patient is not nervous/anxious.        History:  Past Medical History:   Diagnosis Date   • Allergic rhinitis    • Arthritis    • Diabetes mellitus (CMS/HCC)    • Disease of thyroid gland    • GERD (gastroesophageal reflux disease)    • Hyperlipidemia    • Hypertension    • Rheumatic fever        Past Surgical History:   Procedure Laterality Date   • BREAST BIOPSY     • BREAST SURGERY     • CHOLECYSTECTOMY     • KNEE SURGERY     • SHOULDER SURGERY     • TUBAL ABDOMINAL LIGATION     • WRIST SURGERY         Family History   Problem Relation Age of Onset   • Arthritis Other    • Thyroid disease Other    • Diabetes Other    • Obesity Other    • Kidney disease Other    • Liver disease Other    • No Known Problems Mother    • No Known Problems Father    • No Known Problems Sister    • No Known Problems Brother    • Breast cancer Maternal Aunt        Social History     Tobacco Use   • Smoking status: Current Some Day Smoker     Packs/day: 1.00     Types: Electronic Cigarette   • Smokeless tobacco: Never Used   • Tobacco comment: Pt stated that she does vapor cigarette   Substance Use Topics   • Alcohol use: No   • Drug use: No       Allergies:  Allergies   Allergen Reactions   • Codeine Anaphylaxis   • Nubain [Nalbuphine] Other (See Comments)     Excessive sedation       Medications:    Current Outpatient Medications:   •  amoxicillin-clavulanate (Augmentin) 875-125 MG per tablet, Take 1 tablet by mouth 2 (Two) Times a Day., Disp: 14 tablet, Rfl: 0  •  Blood Glucose Monitoring Suppl (D-CARE GLUCOMETER) w/Device kit, 1 unit marking on U-100 syringe 3 (Three) Times a Day As Needed (as needed)., Disp: 1 kit, Rfl: 0  •  Cholecalciferol (VITAMIN D3) 400 UNITS capsule, Take 1 tablet by mouth daily., Disp: , Rfl:   •  diclofenac (VOLTAREN) 75 MG EC tablet, Take 1 tablet by mouth Daily., Disp: 30 tablet, Rfl: 0  •  glimepiride (AMARYL) 2 MG tablet, Take 1 tablet by mouth once  "daily, Disp: 90 tablet, Rfl: 3  •  glucose blood test strip, Use as instructed, Disp: 100 each, Rfl: 12  •  Lancets misc, Use as directed, Disp: 100 each, Rfl: 12  •  levothyroxine (SYNTHROID, LEVOTHROID) 75 MCG tablet, Take 1 tablet by mouth Daily., Disp: 90 tablet, Rfl: 3  •  lisinopril (PRINIVIL,ZESTRIL) 5 MG tablet, Take 1 tablet by mouth once daily, Disp: 90 tablet, Rfl: 3  •  lovastatin (MEVACOR) 40 MG tablet, Take 1 tablet by mouth Daily., Disp: 90 tablet, Rfl: 3  •  metFORMIN (GLUCOPHAGE) 1000 MG tablet, Take 1 tablet by mouth 2 (Two) Times a Day., Disp: 180 tablet, Rfl: 3  •  traZODone (DESYREL) 50 MG tablet, Take 1 tablet by mouth Every Night., Disp: 90 tablet, Rfl: 3    OBJECTIVE:    Vital Signs:   Vitals:    06/29/21 1412   BP: 140/80   Pulse: 98   Temp: 97.5 °F (36.4 °C)   SpO2: 98%   Weight: 83.9 kg (185 lb)   Height: 162.6 cm (64.02\")       Physical Exam:   General Appearance:    Alert, cooperative, in no acute distress   Head:    Normocephalic, without obvious abnormality, atraumatic   Eyes:            Lids and lashes normal, conjunctivae and sclerae normal, no   icterus, no pallor, corneas clear, PERRLA   Ears:    Ears appear intact with no abnormalities noted   Throat:   No oral lesions, no thrush, oral mucosa moist   Neck:   No adenopathy, supple, trachea midline, no thyromegaly, no   carotid bruit, no JVD   Lungs:     Clear to auscultation,respirations regular, even and                  unlabored    Heart:    Regular rhythm and normal rate, normal S1 and S2, no            murmur, no gallop, no rub, no click   Chest Wall:    No abnormalities observed   Abdomen:     Normal bowel sounds, no masses, no organomegaly, soft        non-tender, non-distended, no guarding, there is evidence of epigastric  tenderness, no peritoneal signs   Extremities:   Moves all extremities well, no edema, no cyanosis, no             redness   Pulses:   Pulses palpable and equal bilaterally   Skin:   No bleeding, bruising " or rash   Lymph nodes:   No palpable adenopathy   Neurologic:   Cranial nerves 2 - 12 grossly intact, sensation intact     Results Review:   I reviewed the patient's new clinical results.  I reviewed the patient's new imaging results and agree with the interpretation.  I reviewed the patient's other test results and agree with the interpretation    Review of Systems was reviewed and confirmed as accurate as documented by the MA.    ASSESSMENT/PLAN:    1. Oropharyngeal dysphagia        I did have a detailed and extensive discussion with the patient in the office and they understand that they need to undergo upper endoscopy. Full risks and benefits of operative versus nonoperative intervention were discussed with the patient and these include bleeding and esophageal injury. The patient understands, agrees, and wishes to proceed with the surgical treatment plan as mentioned above. The patient had no questions for me at the end of the discussion.       I discussed the patients findings and my recommendations with patient.     Electronically signed by Kevan Leon MD  06/29/21 08:52 EDT

## 2021-07-07 ENCOUNTER — LAB (OUTPATIENT)
Dept: LAB | Facility: HOSPITAL | Age: 71
End: 2021-07-07

## 2021-07-07 DIAGNOSIS — Z01.818 PRE-OP TESTING: ICD-10-CM

## 2021-07-07 LAB — SARS-COV-2 RNA NOSE QL NAA+PROBE: NOT DETECTED

## 2021-07-07 PROCEDURE — U0004 COV-19 TEST NON-CDC HGH THRU: HCPCS

## 2021-07-07 PROCEDURE — C9803 HOPD COVID-19 SPEC COLLECT: HCPCS

## 2021-07-09 ENCOUNTER — OUTSIDE FACILITY SERVICE (OUTPATIENT)
Dept: SURGERY | Facility: CLINIC | Age: 71
End: 2021-07-09

## 2021-07-09 PROCEDURE — 43239 EGD BIOPSY SINGLE/MULTIPLE: CPT | Performed by: SURGERY

## 2021-07-09 PROCEDURE — 43249 ESOPH EGD DILATION <30 MM: CPT | Performed by: SURGERY

## 2021-07-22 NOTE — PROGRESS NOTES
Patient: Reema Brothers    YOB: 1950    Date: 07/27/2021    Primary Care Provider: Mikey Hyde MD    Reason for Consultation: Follow-up EGD    Chief Complaint   Patient presents with   • Follow-up     EGD       History of present illness:  I saw the patient in the office today as a followup from their recent EGD with biopsy and esophageal dilation, the pathology report did show Helicobacter pylori gastritis .  They state that they have done well but still feels something in her throat and has dysphagia.    She did have dilation at the time of upper endoscopy, she did have bacteria noted.    The following portions of the patient's history were reviewed and updated as appropriate: allergies, current medications, past family history, past medical history, past social history, past surgical history and problem list.    Review of Systems   Constitutional: Negative for chills, fever and unexpected weight change.   HENT: Positive for trouble swallowing. Negative for hearing loss and voice change.    Eyes: Negative for visual disturbance.   Respiratory: Negative for apnea, cough, chest tightness, shortness of breath and wheezing.    Cardiovascular: Negative for chest pain, palpitations and leg swelling.   Gastrointestinal: Negative for abdominal distention, abdominal pain, anal bleeding, blood in stool, constipation, diarrhea, nausea, rectal pain and vomiting.   Endocrine: Negative for cold intolerance and heat intolerance.   Genitourinary: Negative for difficulty urinating, dysuria and flank pain.   Musculoskeletal: Negative for back pain and gait problem.   Skin: Negative for color change, rash and wound.   Neurological: Negative for dizziness, syncope, speech difficulty, weakness, light-headedness, numbness and headaches.   Hematological: Negative for adenopathy. Does not bruise/bleed easily.   Psychiatric/Behavioral: Negative for confusion. The patient is not nervous/anxious.        Vital Signs:  "  Vitals:    07/27/21 1410   BP: 164/92   Pulse: 93   Temp: 98.4 °F (36.9 °C)   TempSrc: Temporal   SpO2: 98%   Weight: 85.2 kg (187 lb 12.8 oz)   Height: 162.6 cm (64.02\")       Allergies:  Allergies   Allergen Reactions   • Codeine Anaphylaxis   • Nubain [Nalbuphine] Other (See Comments)     Excessive sedation       Medications:    Current Outpatient Medications:   •  amoxicillin-clavulanate (Augmentin) 875-125 MG per tablet, Take 1 tablet by mouth 2 (Two) Times a Day., Disp: 14 tablet, Rfl: 0  •  Blood Glucose Monitoring Suppl (D-CARE GLUCOMETER) w/Device kit, 1 unit marking on U-100 syringe 3 (Three) Times a Day As Needed (as needed)., Disp: 1 kit, Rfl: 0  •  Cholecalciferol (VITAMIN D3) 400 UNITS capsule, Take 1 tablet by mouth daily., Disp: , Rfl:   •  diclofenac (VOLTAREN) 75 MG EC tablet, Take 1 tablet by mouth Daily., Disp: 30 tablet, Rfl: 0  •  glimepiride (AMARYL) 2 MG tablet, Take 1 tablet by mouth once daily, Disp: 90 tablet, Rfl: 3  •  glucose blood test strip, Use as instructed, Disp: 100 each, Rfl: 12  •  Lancets misc, Use as directed, Disp: 100 each, Rfl: 12  •  levothyroxine (SYNTHROID, LEVOTHROID) 75 MCG tablet, Take 1 tablet by mouth Daily., Disp: 90 tablet, Rfl: 3  •  lisinopril (PRINIVIL,ZESTRIL) 5 MG tablet, Take 1 tablet by mouth once daily, Disp: 90 tablet, Rfl: 3  •  lovastatin (MEVACOR) 40 MG tablet, Take 1 tablet by mouth Daily., Disp: 90 tablet, Rfl: 3  •  metFORMIN (GLUCOPHAGE) 1000 MG tablet, Take 1 tablet by mouth 2 (Two) Times a Day., Disp: 180 tablet, Rfl: 3  •  traZODone (DESYREL) 50 MG tablet, Take 1 tablet by mouth Every Night., Disp: 90 tablet, Rfl: 3    Physical Exam:   General Appearance:    Alert, cooperative, in no acute distress   Abdomen:     no masses, no organomegaly, soft non-tender, non-distended, no guarding, wounds are well healed, no evidence of recurrent hernia, no peritoneal signs   Chest:      Clear toausculation            Cor:  Regular rate and " rhythm      Results Review:   I reviewed the patient's new clinical results.  I reviewed the patient's new imaging results and agree with the interpretation.  I reviewed the patient's other test results and agree with the interpretation    Review of Systems was reviewed and confirmed as accurate as documented by the MA.    Assessment / Plan:    1. Oropharyngeal dysphagia        I did discuss the situation with the patient today in the office and they have done well from their recent EGD with biopsy. I have told the patient she needs to continue with her current medications, she does have symptomatology of urinary tract infection and I am going to give her a prescription for Bactrim.  I would like to give it a urinalysis on the patient to and then see her back in the office in 1 week.    Electronically signed by Kevan Leon MD  07/27/21

## 2021-07-27 ENCOUNTER — OFFICE VISIT (OUTPATIENT)
Dept: SURGERY | Facility: CLINIC | Age: 71
End: 2021-07-27

## 2021-07-27 VITALS
HEIGHT: 64 IN | TEMPERATURE: 98.4 F | BODY MASS INDEX: 32.06 KG/M2 | OXYGEN SATURATION: 98 % | SYSTOLIC BLOOD PRESSURE: 164 MMHG | WEIGHT: 187.8 LBS | DIASTOLIC BLOOD PRESSURE: 92 MMHG | HEART RATE: 93 BPM

## 2021-07-27 DIAGNOSIS — R82.90 URINE ABNORMALITY: Primary | ICD-10-CM

## 2021-07-27 DIAGNOSIS — R13.12 OROPHARYNGEAL DYSPHAGIA: Primary | ICD-10-CM

## 2021-07-27 PROCEDURE — 99213 OFFICE O/P EST LOW 20 MIN: CPT | Performed by: SURGERY

## 2021-07-27 RX ORDER — SULFAMETHOXAZOLE AND TRIMETHOPRIM 800; 160 MG/1; MG/1
1 TABLET ORAL 2 TIMES DAILY
Qty: 6 TABLET | Refills: 0 | Status: CANCELLED | OUTPATIENT
Start: 2021-07-27

## 2021-07-27 RX ORDER — SULFAMETHOXAZOLE AND TRIMETHOPRIM 800; 160 MG/1; MG/1
1 TABLET ORAL 2 TIMES DAILY
Qty: 14 TABLET | Refills: 0 | Status: SHIPPED | OUTPATIENT
Start: 2021-07-27 | End: 2021-08-03

## 2021-07-30 ENCOUNTER — LAB (OUTPATIENT)
Dept: LAB | Facility: HOSPITAL | Age: 71
End: 2021-07-30

## 2021-07-30 DIAGNOSIS — R82.90 URINE ABNORMALITY: ICD-10-CM

## 2021-07-30 LAB
25(OH)D3 SERPL-MCNC: 34.6 NG/ML (ref 30–100)
ALBUMIN UR-MCNC: <1.2 MG/DL
BACTERIA UR QL AUTO: NORMAL /HPF
BILIRUB UR QL STRIP: NEGATIVE
CHOLEST SERPL-MCNC: 145 MG/DL (ref 0–200)
CK SERPL-CCNC: 116 U/L (ref 20–180)
CLARITY UR: CLEAR
COLOR UR: YELLOW
GLUCOSE UR STRIP-MCNC: NEGATIVE MG/DL
HDLC SERPL-MCNC: 47 MG/DL (ref 40–60)
HGB UR QL STRIP.AUTO: NEGATIVE
HYALINE CASTS UR QL AUTO: NORMAL /LPF
KETONES UR QL STRIP: NEGATIVE
LDLC SERPL CALC-MCNC: 72 MG/DL (ref 0–100)
LDLC/HDLC SERPL: 1.46 {RATIO}
LEUKOCYTE ESTERASE UR QL STRIP.AUTO: NEGATIVE
NITRITE UR QL STRIP: NEGATIVE
PH UR STRIP.AUTO: 6 [PH] (ref 5–8)
PROT UR QL STRIP: NEGATIVE
RBC # UR: NORMAL /HPF
REF LAB TEST METHOD: NORMAL
SP GR UR STRIP: 1.01 (ref 1–1.03)
SQUAMOUS #/AREA URNS HPF: NORMAL /HPF
TRIGL SERPL-MCNC: 148 MG/DL (ref 0–150)
TSH SERPL DL<=0.05 MIU/L-ACNC: 8.51 UIU/ML (ref 0.27–4.2)
UROBILINOGEN UR QL STRIP: NORMAL
VLDLC SERPL-MCNC: 26 MG/DL (ref 5–40)
WBC UR QL AUTO: NORMAL /HPF

## 2021-07-30 PROCEDURE — 82550 ASSAY OF CK (CPK): CPT | Performed by: INTERNAL MEDICINE

## 2021-07-30 PROCEDURE — 81001 URINALYSIS AUTO W/SCOPE: CPT

## 2021-07-30 PROCEDURE — 80061 LIPID PANEL: CPT | Performed by: INTERNAL MEDICINE

## 2021-07-30 PROCEDURE — 82306 VITAMIN D 25 HYDROXY: CPT | Performed by: INTERNAL MEDICINE

## 2021-07-30 PROCEDURE — 84443 ASSAY THYROID STIM HORMONE: CPT | Performed by: INTERNAL MEDICINE

## 2021-07-30 PROCEDURE — 82043 UR ALBUMIN QUANTITATIVE: CPT | Performed by: INTERNAL MEDICINE

## 2021-07-30 PROCEDURE — 36415 COLL VENOUS BLD VENIPUNCTURE: CPT | Performed by: INTERNAL MEDICINE

## 2021-08-03 ENCOUNTER — OFFICE VISIT (OUTPATIENT)
Dept: SURGERY | Facility: CLINIC | Age: 71
End: 2021-08-03

## 2021-08-03 VITALS
TEMPERATURE: 97.8 F | OXYGEN SATURATION: 99 % | WEIGHT: 184 LBS | DIASTOLIC BLOOD PRESSURE: 74 MMHG | HEART RATE: 89 BPM | SYSTOLIC BLOOD PRESSURE: 138 MMHG | HEIGHT: 64 IN | BODY MASS INDEX: 31.41 KG/M2

## 2021-08-03 DIAGNOSIS — R10.13 EPIGASTRIC PAIN: Primary | ICD-10-CM

## 2021-08-03 PROCEDURE — 99213 OFFICE O/P EST LOW 20 MIN: CPT | Performed by: SURGERY

## 2021-08-03 RX ORDER — OMEPRAZOLE 40 MG/1
40 CAPSULE, DELAYED RELEASE ORAL DAILY
COMMUNITY
Start: 2021-07-09 | End: 2021-09-14 | Stop reason: SDUPTHER

## 2021-08-03 NOTE — PROGRESS NOTES
Patient: Reema Brothers  YOB: 1950    Date: 08/03/2021    Primary Care Provider: Mikey Hyde MD    Chief Complaint   Patient presents with   • Follow-up     dysphagia       History: Patient is here for follow-up dysphagia.  Patient had recent upper endoscopy with biopsy and esophageal dilation done recently.  Patient is also here for follow-up dysuria with painful, frequent urination.  Urinalysis was performed 07/30/2021, it was essentially normal.      Patient states she does feel better since she is finished her recent antibiotics.    The following portions of the patient's history were reviewed and updated as appropriate: allergies, current medications, past family history, past medical history, past social history, past surgical history and problem list.    Review of Systems   Constitutional: Negative for chills, fever and unexpected weight change.   HENT: Negative for hearing loss, trouble swallowing and voice change.    Eyes: Negative for visual disturbance.   Respiratory: Negative for apnea, cough, chest tightness, shortness of breath and wheezing.    Cardiovascular: Negative for chest pain, palpitations and leg swelling.   Gastrointestinal: Negative for abdominal distention, abdominal pain, anal bleeding, blood in stool, constipation, diarrhea, nausea, rectal pain and vomiting.   Endocrine: Negative for cold intolerance and heat intolerance.   Genitourinary: Negative for difficulty urinating, dysuria and flank pain.   Musculoskeletal: Negative for back pain and gait problem.   Skin: Negative for color change, rash and wound.   Neurological: Negative for dizziness, syncope, speech difficulty, weakness, light-headedness, numbness and headaches.   Hematological: Negative for adenopathy. Does not bruise/bleed easily.   Psychiatric/Behavioral: Negative for confusion. The patient is not nervous/anxious.        Vital Signs  Vitals:    08/03/21 1400   BP: 138/74   Pulse: 89   Temp: 97.8 °F  "(36.6 °C)   SpO2: 99%   Weight: 83.5 kg (184 lb)   Height: 162.6 cm (64.02\")       Allergies:  Allergies   Allergen Reactions   • Codeine Anaphylaxis   • Nubain [Nalbuphine] Other (See Comments)     Excessive sedation       Medications:    Current Outpatient Medications:   •  amoxicillin-clavulanate (Augmentin) 875-125 MG per tablet, Take 1 tablet by mouth 2 (Two) Times a Day., Disp: 14 tablet, Rfl: 0  •  Blood Glucose Monitoring Suppl (D-CARE GLUCOMETER) w/Device kit, 1 unit marking on U-100 syringe 3 (Three) Times a Day As Needed (as needed)., Disp: 1 kit, Rfl: 0  •  Cholecalciferol (VITAMIN D3) 400 UNITS capsule, Take 1 tablet by mouth daily., Disp: , Rfl:   •  diclofenac (VOLTAREN) 75 MG EC tablet, Take 1 tablet by mouth Daily., Disp: 30 tablet, Rfl: 0  •  glimepiride (AMARYL) 2 MG tablet, Take 1 tablet by mouth once daily, Disp: 90 tablet, Rfl: 3  •  glucose blood test strip, Use as instructed, Disp: 100 each, Rfl: 12  •  Lancets misc, Use as directed, Disp: 100 each, Rfl: 12  •  levothyroxine (SYNTHROID, LEVOTHROID) 75 MCG tablet, Take 1 tablet by mouth Daily., Disp: 90 tablet, Rfl: 3  •  lisinopril (PRINIVIL,ZESTRIL) 5 MG tablet, Take 1 tablet by mouth once daily, Disp: 90 tablet, Rfl: 3  •  lovastatin (MEVACOR) 40 MG tablet, Take 1 tablet by mouth Daily., Disp: 90 tablet, Rfl: 3  •  metFORMIN (GLUCOPHAGE) 1000 MG tablet, Take 1 tablet by mouth 2 (Two) Times a Day., Disp: 180 tablet, Rfl: 3  •  omeprazole (priLOSEC) 40 MG capsule, Take 40 mg by mouth Daily., Disp: , Rfl:   •  traZODone (DESYREL) 50 MG tablet, Take 1 tablet by mouth Every Night., Disp: 90 tablet, Rfl: 3  •  omeprazole (priLOSEC) 40 MG capsule, Take 1 capsule by mouth Daily., Disp: 30 capsule, Rfl: 5    Physical Exam:   General Appearance:    Alert, cooperative, in no acute distress   Head:    Normocephalic, without obvious abnormality, atraumatic   Lungs:     Clear to auscultation,respirations regular, even and                  unlabored    " Heart:    Regular rhythm and normal rate, normal S1 and S2, no            murmur, no gallop, no rub, no click   Abdomen:     Normal bowel sounds, no masses, no organomegaly, soft        non-tender, non-distended, no guarding, no rebound                tenderness, no peritoneal signs   Extremities:   Moves all extremities well, no edema, no cyanosis, no             redness   Pulses:   Pulses palpable and equal bilaterally   Skin:   No bleeding, bruising or rash     Results Review:   I reviewed the patient's new clinical results.  I reviewed the patient's new imaging results and agree with the interpretation.  I reviewed the patient's other test results and agree with the interpretation     Review of Systems was reviewed and confirmed as accurate as documented by the MA.    ASSESSMENT/PLAN:    1. Epigastric pain       I did have a detailed and extensive discussion with the patient in the office today and I think she is doing well, I did send her in a prescription for Prilosec, she is finished her antibiotics, I want to see her back in the office in 1 year if she is having any further problems.    Electronically signed by Kevan Leon MD  08/04/21

## 2021-08-04 RX ORDER — OMEPRAZOLE 40 MG/1
40 CAPSULE, DELAYED RELEASE ORAL DAILY
Qty: 90 CAPSULE | Refills: 3 | Status: SHIPPED | OUTPATIENT
Start: 2021-08-04 | End: 2022-08-04

## 2021-08-04 RX ORDER — OMEPRAZOLE 40 MG/1
40 CAPSULE, DELAYED RELEASE ORAL DAILY
Qty: 30 CAPSULE | Refills: 5 | Status: SHIPPED | OUTPATIENT
Start: 2021-08-04 | End: 2021-09-14 | Stop reason: SDUPTHER

## 2021-09-14 ENCOUNTER — OFFICE VISIT (OUTPATIENT)
Dept: INTERNAL MEDICINE | Facility: CLINIC | Age: 71
End: 2021-09-14

## 2021-09-14 VITALS
BODY MASS INDEX: 31.76 KG/M2 | HEIGHT: 64 IN | HEART RATE: 90 BPM | OXYGEN SATURATION: 96 % | TEMPERATURE: 97.1 F | SYSTOLIC BLOOD PRESSURE: 124 MMHG | DIASTOLIC BLOOD PRESSURE: 80 MMHG | WEIGHT: 186 LBS

## 2021-09-14 DIAGNOSIS — R60.0 LOCALIZED EDEMA: ICD-10-CM

## 2021-09-14 DIAGNOSIS — E03.8 OTHER SPECIFIED HYPOTHYROIDISM: ICD-10-CM

## 2021-09-14 DIAGNOSIS — E11.69 TYPE 2 DIABETES MELLITUS WITH OTHER SPECIFIED COMPLICATION, WITHOUT LONG-TERM CURRENT USE OF INSULIN (HCC): ICD-10-CM

## 2021-09-14 DIAGNOSIS — I10 ESSENTIAL HYPERTENSION: Primary | ICD-10-CM

## 2021-09-14 PROCEDURE — 99214 OFFICE O/P EST MOD 30 MIN: CPT | Performed by: INTERNAL MEDICINE

## 2021-09-14 RX ORDER — LEVOTHYROXINE SODIUM 88 UG/1
75 TABLET ORAL DAILY
Qty: 30 TABLET | Refills: 1 | Status: SHIPPED | OUTPATIENT
Start: 2021-09-14 | End: 2021-10-14

## 2021-09-14 NOTE — PROGRESS NOTES
Subjective   Reema Brothers is a 71 y.o. female.     Chief Complaint   Patient presents with   • Follow-up     recent lab results   • Diabetes   • Hypothyroidism       History of Present Illness   Patient here for follow-up of.  TSH elevated 8.5.  Hypertension stable on medication.  Diabetes is stable on medication.  Dyslipidemia stable on medication.  Anxiety stable now.  Insomnia stable on medication.  Vitamin D stable    Current Outpatient Medications:   •  Blood Glucose Monitoring Suppl (D-CARE GLUCOMETER) w/Device kit, 1 unit marking on U-100 syringe 3 (Three) Times a Day As Needed (as needed)., Disp: 1 kit, Rfl: 0  •  Cholecalciferol (VITAMIN D3) 400 UNITS capsule, Take 1 tablet by mouth daily., Disp: , Rfl:   •  diclofenac (VOLTAREN) 75 MG EC tablet, Take 1 tablet by mouth Daily., Disp: 30 tablet, Rfl: 0  •  glimepiride (AMARYL) 2 MG tablet, Take 1 tablet by mouth once daily, Disp: 90 tablet, Rfl: 3  •  glucose blood test strip, Use as instructed, Disp: 100 each, Rfl: 12  •  Lancets misc, Use as directed, Disp: 100 each, Rfl: 12  •  levothyroxine (SYNTHROID, LEVOTHROID) 75 MCG tablet, Take 1 tablet by mouth Daily., Disp: 90 tablet, Rfl: 3  •  lisinopril (PRINIVIL,ZESTRIL) 5 MG tablet, Take 1 tablet by mouth once daily, Disp: 90 tablet, Rfl: 3  •  lovastatin (MEVACOR) 40 MG tablet, Take 1 tablet by mouth Daily., Disp: 90 tablet, Rfl: 3  •  metFORMIN (GLUCOPHAGE) 1000 MG tablet, Take 1 tablet by mouth 2 (Two) Times a Day., Disp: 180 tablet, Rfl: 3  •  omeprazole (priLOSEC) 40 MG capsule, Take 1 capsule by mouth Daily., Disp: 90 capsule, Rfl: 3  •  traZODone (DESYREL) 50 MG tablet, Take 1 tablet by mouth Every Night., Disp: 90 tablet, Rfl: 3  •  amoxicillin-clavulanate (Augmentin) 875-125 MG per tablet, Take 1 tablet by mouth 2 (Two) Times a Day., Disp: 14 tablet, Rfl: 0  •  omeprazole (priLOSEC) 40 MG capsule, Take 40 mg by mouth Daily., Disp: , Rfl:   •  omeprazole (priLOSEC) 40 MG capsule, Take 1 capsule  by mouth Daily., Disp: 30 capsule, Rfl: 5    The following portions of the patient's history were reviewed and updated as appropriate: allergies, current medications, past family history, past medical history, past social history, past surgical history and problem list.    Review of Systems   Constitutional: Negative.    Respiratory: Negative.    Cardiovascular: Negative.    Gastrointestinal: Negative.    Musculoskeletal: Negative.    Skin: Negative.    Neurological: Negative.    Psychiatric/Behavioral: Negative.        Objective   Physical Exam  Cardiovascular:      Rate and Rhythm: Normal rate and regular rhythm.      Heart sounds: Normal heart sounds.   Pulmonary:      Effort: Pulmonary effort is normal.      Breath sounds: Normal breath sounds.   Abdominal:      General: Bowel sounds are normal.   Musculoskeletal:      Cervical back: Neck supple.   Skin:     General: Skin is warm.   Neurological:      Mental Status: She is alert and oriented to person, place, and time.         All tests have been reviewed.    Assessment/Plan   There are no diagnoses linked to this encounter.          Essential hypertension continue medication     Type 2 diabetes mellitus with other specified complication, without long-term current use of insulin (CMS/[facility]) continue medication       hypothyroidism  increase levothyroxine to 88 mcg check lab in 6 weeks       Dyslipidemia  continue good diet     Anxiety mainly due to grieving continue to watch for now no depression no suicidal thoughts     Insomnia, continue trazodone     Grieving  improved     Vitamin D deficiency continue supplement  -     Vitamin D 25 Hydroxy        1.5 mo PE with others and labs

## 2021-10-08 ENCOUNTER — TELEPHONE (OUTPATIENT)
Dept: INTERNAL MEDICINE | Facility: CLINIC | Age: 71
End: 2021-10-08

## 2021-10-08 NOTE — TELEPHONE ENCOUNTER
Caller: Reema Brothers    Relationship: Self    Best call back number: [phone]    What medication are you requesting: ANTIBIOTIC    What are your current symptoms: DIARRHEA, FEVER    How long have you been experiencing symptoms:LAST Saturday    Have you had these symptoms before:    [] Yes  [x] No    Have you been treated for these symptoms before:   [] Yes  [x] No    If a prescription is needed, what is your preferred pharmacy and phone number:  [address] [phone] [facility] [phone]   [phone]    Additional notes: PATIENT STATES SHE AND HER  CANNOT EVEN GET OUT TO GO TO THE DOCTOR

## 2021-10-11 DIAGNOSIS — R11.0 NAUSEA: Primary | ICD-10-CM

## 2021-10-11 RX ORDER — PROMETHAZINE HYDROCHLORIDE 25 MG/1
25 TABLET ORAL EVERY 6 HOURS PRN
Qty: 40 TABLET | Refills: 0 | Status: SHIPPED | OUTPATIENT
Start: 2021-10-11

## 2021-10-12 ENCOUNTER — TELEPHONE (OUTPATIENT)
Dept: INTERNAL MEDICINE | Facility: CLINIC | Age: 71
End: 2021-10-12

## 2021-10-12 NOTE — TELEPHONE ENCOUNTER
Contacted patient since granddaughter is NOT on release and she gave me verbal OK to speak with granddaughter.     Contacted granddaughter and explained that we could not order COVID infusion without PCR testing per protocol. Granddaughter stated that patient was too weak to drive to [facility] to have testing completed and she would contact [facility] in [place] to see if they could accept rapid results and order infusion.     I attempted to make patient a video visit and granddaughter declined for now. I explained the importance to granddaughter of patient staying hydrated and advised her to follow up if symptoms worsened or did not improve. Granddaughter expressed understanding.

## 2021-10-12 NOTE — TELEPHONE ENCOUNTER
Caller: BERNARDO     Relationship: GRANDDAUGHTER     Best call back number: [phone]    What orders are you requesting (i.e. lab or imaging): ANTIBODIES REGEN/COV INJECTION     In what timeframe would the patient need to come in: AS SOON AS POSSIBLE     Where will you receive your lab/imaging services: [facility] IF POSSIBLE OR [facility] IN [place]     BERNARDO FEELS THE PATIENT WOULD BENEFIT FROM THE REGEN/COV INJECTION    PATIENT WAS TESTED POSITIVE FOR COVID ON 10-8-21  SHE HAS A FEVER, DIARRHEA, FATIGUE   AND BERNARDO STATES SHE IS NOT GETTING ANY BETTER

## 2021-10-13 ENCOUNTER — HOSPITAL ENCOUNTER (OUTPATIENT)
Dept: INFUSION THERAPY | Facility: HOSPITAL | Age: 71
Setting detail: INFUSION SERIES
Discharge: HOME | End: 2021-10-13

## 2021-10-13 VITALS
RESPIRATION RATE: 16 BRPM | DIASTOLIC BLOOD PRESSURE: 56 MMHG | TEMPERATURE: 98.8 F | SYSTOLIC BLOOD PRESSURE: 140 MMHG | HEART RATE: 78 BPM | OXYGEN SATURATION: 96 % | WEIGHT: 184 LBS | BODY MASS INDEX: 31.56 KG/M2

## 2021-10-13 DIAGNOSIS — U07.1 COVID-19: Primary | ICD-10-CM

## 2021-10-13 PROCEDURE — 96413 CHEMO IV INFUSION 1 HR: CPT

## 2021-10-13 PROCEDURE — 25010000002 INJECTION, CASIRIVIMAB AND IMDEVIMAB, 1200 MG: Performed by: EMERGENCY MEDICINE

## 2021-10-13 PROCEDURE — M0243 CASIRIVI AND IMDEVI INFUSION: HCPCS | Performed by: EMERGENCY MEDICINE

## 2021-10-13 RX ORDER — DIPHENHYDRAMINE HCL 25 MG
50 CAPSULE ORAL ONCE AS NEEDED
Status: DISCONTINUED | OUTPATIENT
Start: 2021-10-13 | End: 2021-10-15 | Stop reason: HOSPADM

## 2021-10-13 RX ORDER — SODIUM CHLORIDE 9 MG/ML
30 INJECTION, SOLUTION INTRAVENOUS ONCE
Status: DISCONTINUED | OUTPATIENT
Start: 2021-10-13 | End: 2021-10-15 | Stop reason: HOSPADM

## 2021-10-13 RX ORDER — DIPHENHYDRAMINE HYDROCHLORIDE 50 MG/ML
50 INJECTION INTRAMUSCULAR; INTRAVENOUS ONCE AS NEEDED
Status: DISCONTINUED | OUTPATIENT
Start: 2021-10-13 | End: 2021-10-15 | Stop reason: HOSPADM

## 2021-10-13 RX ORDER — DIPHENHYDRAMINE HCL 25 MG
50 TABLET ORAL ONCE AS NEEDED
Status: CANCELLED | OUTPATIENT
Start: 2021-10-13

## 2021-10-13 RX ORDER — METHYLPREDNISOLONE SODIUM SUCCINATE 125 MG/2ML
125 INJECTION, POWDER, LYOPHILIZED, FOR SOLUTION INTRAMUSCULAR; INTRAVENOUS AS NEEDED
Status: CANCELLED | OUTPATIENT
Start: 2021-10-13

## 2021-10-13 RX ORDER — EPINEPHRINE 1 MG/ML
0.3 INJECTION, SOLUTION, CONCENTRATE INTRAVENOUS AS NEEDED
Status: DISCONTINUED | OUTPATIENT
Start: 2021-10-13 | End: 2021-10-15 | Stop reason: HOSPADM

## 2021-10-13 RX ORDER — SODIUM CHLORIDE 9 MG/ML
30 INJECTION, SOLUTION INTRAVENOUS ONCE
Status: CANCELLED | OUTPATIENT
Start: 2021-10-13

## 2021-10-13 RX ORDER — DIPHENHYDRAMINE HYDROCHLORIDE 50 MG/ML
50 INJECTION INTRAMUSCULAR; INTRAVENOUS ONCE AS NEEDED
Status: CANCELLED | OUTPATIENT
Start: 2021-10-13

## 2021-10-13 RX ORDER — EPINEPHRINE 1 MG/ML
0.3 INJECTION, SOLUTION, CONCENTRATE INTRAVENOUS AS NEEDED
Status: CANCELLED | OUTPATIENT
Start: 2021-10-13

## 2021-10-13 RX ORDER — METHYLPREDNISOLONE SODIUM SUCCINATE 125 MG/2ML
125 INJECTION, POWDER, LYOPHILIZED, FOR SOLUTION INTRAMUSCULAR; INTRAVENOUS AS NEEDED
Status: DISCONTINUED | OUTPATIENT
Start: 2021-10-13 | End: 2021-10-15 | Stop reason: HOSPADM

## 2021-10-13 RX ADMIN — IMDEVIMAB: 300 INJECTION, SOLUTION, CONCENTRATE INTRAVENOUS at 16:32

## 2021-10-14 DIAGNOSIS — E03.8 OTHER SPECIFIED HYPOTHYROIDISM: ICD-10-CM

## 2021-10-14 RX ORDER — LEVOTHYROXINE SODIUM 88 UG/1
TABLET ORAL
Qty: 30 TABLET | Refills: 0 | Status: SHIPPED | OUTPATIENT
Start: 2021-10-14 | End: 2021-12-06

## 2021-10-14 NOTE — TELEPHONE ENCOUNTER
Rx Refill Note  Requested Prescriptions     Pending Prescriptions Disp Refills   • Euthyrox 88 MCG tablet [Pharmacy Med Name: Euthyrox 88 MCG Oral Tablet] 30 tablet 0     Sig: Take 1 tablet by mouth once daily      Last office visit with prescribing clinician: 9/14/2021      Next office visit with prescribing clinician: Visit date not found            Chelle Neal MA  10/14/21, 14:34 EDT       Abnormal labs 07/30/2021; has not had active labs completed.

## 2021-11-15 LAB
HBA1C MFR BLD: 7.3 % (ref 4.8–5.6)
TSH SERPL DL<=0.005 MIU/L-ACNC: 1.03 UIU/ML (ref 0.27–4.2)

## 2021-12-06 DIAGNOSIS — E03.8 OTHER SPECIFIED HYPOTHYROIDISM: ICD-10-CM

## 2021-12-06 RX ORDER — LEVOTHYROXINE SODIUM 88 UG/1
TABLET ORAL
Qty: 90 TABLET | Refills: 0 | Status: SHIPPED | OUTPATIENT
Start: 2021-12-06 | End: 2022-03-03

## 2021-12-06 NOTE — TELEPHONE ENCOUNTER
Rx Refill Note  Requested Prescriptions     Pending Prescriptions Disp Refills   • Euthyrox 88 MCG tablet [Pharmacy Med Name: Euthyrox 88 MCG Oral Tablet] 30 tablet 0     Sig: Take 1 tablet by mouth once daily      Last office visit with prescribing clinician: 9/14/2021      Next office visit with prescribing clinician: Visit date not found            ROSE SERRANO MA  12/06/21, 10:58 EST

## 2021-12-08 DIAGNOSIS — E78.5 DYSLIPIDEMIA: ICD-10-CM

## 2021-12-08 RX ORDER — LOVASTATIN 40 MG/1
TABLET ORAL
Qty: 90 TABLET | Refills: 3 | Status: SHIPPED | OUTPATIENT
Start: 2021-12-08

## 2021-12-08 NOTE — TELEPHONE ENCOUNTER
Rx Refill Note  Requested Prescriptions     Pending Prescriptions Disp Refills   • lovastatin (MEVACOR) 40 MG tablet [Pharmacy Med Name: Lovastatin 40 MG Oral Tablet] 90 tablet 0     Sig: Take 1 tablet by mouth once daily      Last office visit with prescribing clinician: 9/14/2021      Next office visit with prescribing clinician: Visit date not found            ROSE SERRANO MA  12/08/21, 18:03 EST

## 2021-12-20 RX ORDER — LISINOPRIL 5 MG/1
TABLET ORAL
Qty: 90 TABLET | Refills: 3 | Status: SHIPPED | OUTPATIENT
Start: 2021-12-20

## 2021-12-27 DIAGNOSIS — E11.69 TYPE 2 DIABETES MELLITUS WITH OTHER SPECIFIED COMPLICATION, WITHOUT LONG-TERM CURRENT USE OF INSULIN (HCC): ICD-10-CM

## 2022-01-20 ENCOUNTER — TELEPHONE (OUTPATIENT)
Dept: INTERNAL MEDICINE | Facility: CLINIC | Age: 72
End: 2022-01-20

## 2022-01-20 DIAGNOSIS — E11.69 TYPE 2 DIABETES MELLITUS WITH OTHER SPECIFIED COMPLICATION, WITHOUT LONG-TERM CURRENT USE OF INSULIN: ICD-10-CM

## 2022-01-20 RX ORDER — GLIMEPIRIDE 2 MG/1
TABLET ORAL
Qty: 90 TABLET | Refills: 3 | Status: SHIPPED | OUTPATIENT
Start: 2022-01-20

## 2022-01-20 NOTE — TELEPHONE ENCOUNTER
Rx Refill Note  Requested Prescriptions     Pending Prescriptions Disp Refills   • glimepiride (AMARYL) 2 MG tablet [Pharmacy Med Name: Glimepiride 2 MG Oral Tablet] 90 tablet 3     Sig: Take 1 tablet by mouth once daily      Last office visit with prescribing clinician: 9/14/2021      Next office visit with prescribing clinician: Visit date not found            ROSE SERRANO MA  01/20/22, 11:36 EST

## 2022-03-03 DIAGNOSIS — E03.8 OTHER SPECIFIED HYPOTHYROIDISM: ICD-10-CM

## 2022-03-03 RX ORDER — LEVOTHYROXINE SODIUM 88 UG/1
TABLET ORAL
Qty: 90 TABLET | Refills: 0 | Status: SHIPPED | OUTPATIENT
Start: 2022-03-03

## 2022-03-03 NOTE — TELEPHONE ENCOUNTER
Rx Refill Note  Requested Prescriptions     Pending Prescriptions Disp Refills   • Euthyrox 88 MCG tablet [Pharmacy Med Name: Euthyrox 88 MCG Oral Tablet] 90 tablet 0     Sig: Take 1 tablet by mouth once daily      Last office visit with prescribing clinician: 9/14/2021      Next office visit with prescribing clinician: Visit date not found            ROSE SERRANO MA  03/03/22, 12:33 EST

## 2022-03-22 ENCOUNTER — TRANSCRIBE ORDERS (OUTPATIENT)
Dept: ADMINISTRATIVE | Facility: HOSPITAL | Age: 72
End: 2022-03-22

## 2022-03-22 DIAGNOSIS — Z12.31 VISIT FOR SCREENING MAMMOGRAM: Primary | ICD-10-CM

## 2022-04-19 ENCOUNTER — TELEPHONE (OUTPATIENT)
Dept: SURGERY | Facility: CLINIC | Age: 72
End: 2022-04-19

## 2022-04-19 NOTE — TELEPHONE ENCOUNTER
SPOKE WITH PT IN REGARDS TO 1 YEAR RECALL BREAST. PT WAS LAST SEEN FOR HER BREST ON 10/23/2019 WITH OUR PRACTICE.  PT DECLINES TO BE SEEN AND THAT SHE IS GOING TO HER NEW PRIMARY CARE PROVIDER RAYNE HEBERT. I OFFERED FOR PT TO BE SEEN BY DR. CORDOVA OR DR. BAIRES WHERE SHE HAS BEEN TREATED BY BOTH PROVIDERS. IS IT OKAY TO REMOVE THIS PT FROM THE RECALL LIST?

## 2022-05-16 ENCOUNTER — HOSPITAL ENCOUNTER (OUTPATIENT)
Dept: MAMMOGRAPHY | Facility: HOSPITAL | Age: 72
Discharge: HOME | End: 2022-05-16
Admitting: INTERNAL MEDICINE

## 2022-05-16 DIAGNOSIS — Z12.31 VISIT FOR SCREENING MAMMOGRAM: ICD-10-CM

## 2022-05-16 PROCEDURE — 77067 SCR MAMMO BI INCL CAD: CPT

## 2022-05-16 PROCEDURE — 77063 BREAST TOMOSYNTHESIS BI: CPT

## 2022-12-23 RX ORDER — LISINOPRIL 5 MG/1
TABLET ORAL
Qty: 90 TABLET | Refills: 0 | OUTPATIENT
Start: 2022-12-23

## 2023-09-12 ENCOUNTER — TRANSCRIBE ORDERS (OUTPATIENT)
Dept: ADMINISTRATIVE | Facility: HOSPITAL | Age: 73
End: 2023-09-12
Payer: COMMERCIAL

## 2023-09-12 DIAGNOSIS — Z12.31 VISIT FOR SCREENING MAMMOGRAM: Primary | ICD-10-CM

## 2023-10-26 ENCOUNTER — HOSPITAL ENCOUNTER (OUTPATIENT)
Dept: MAMMOGRAPHY | Facility: HOSPITAL | Age: 73
Discharge: HOME | End: 2023-10-26
Admitting: INTERNAL MEDICINE
Payer: COMMERCIAL

## 2023-10-26 DIAGNOSIS — Z12.31 VISIT FOR SCREENING MAMMOGRAM: ICD-10-CM

## 2023-10-26 PROCEDURE — 77063 BREAST TOMOSYNTHESIS BI: CPT

## 2023-10-26 PROCEDURE — 77067 SCR MAMMO BI INCL CAD: CPT

## 2024-05-28 ENCOUNTER — TRANSCRIBE ORDERS (OUTPATIENT)
Dept: ADMINISTRATIVE | Facility: HOSPITAL | Age: 74
End: 2024-05-28
Payer: COMMERCIAL

## 2024-05-28 DIAGNOSIS — M79.641 PAIN IN RIGHT HAND: Primary | ICD-10-CM

## 2024-06-24 ENCOUNTER — HOSPITAL ENCOUNTER (EMERGENCY)
Facility: HOSPITAL | Age: 74
Discharge: HOME | End: 2024-06-24
Attending: STUDENT IN AN ORGANIZED HEALTH CARE EDUCATION/TRAINING PROGRAM | Admitting: STUDENT IN AN ORGANIZED HEALTH CARE EDUCATION/TRAINING PROGRAM
Payer: COMMERCIAL

## 2024-06-24 VITALS
DIASTOLIC BLOOD PRESSURE: 82 MMHG | WEIGHT: 175 LBS | RESPIRATION RATE: 19 BRPM | HEART RATE: 92 BPM | OXYGEN SATURATION: 96 % | TEMPERATURE: 97.6 F | HEIGHT: 64 IN | SYSTOLIC BLOOD PRESSURE: 177 MMHG | BODY MASS INDEX: 29.88 KG/M2

## 2024-06-24 DIAGNOSIS — T23.131A SUPERFICIAL BURN OF MULTIPLE FINGERS OF RIGHT HAND EXCLUDING THUMB, INITIAL ENCOUNTER: Primary | ICD-10-CM

## 2024-06-24 PROCEDURE — 99283 EMERGENCY DEPT VISIT LOW MDM: CPT

## 2024-06-24 RX ORDER — IBUPROFEN 200 MG
1 TABLET ORAL ONCE
Status: COMPLETED | OUTPATIENT
Start: 2024-06-24 | End: 2024-06-24

## 2024-06-24 RX ORDER — HYDROCODONE BITARTRATE AND ACETAMINOPHEN 5; 325 MG/1; MG/1
1 TABLET ORAL ONCE
Status: COMPLETED | OUTPATIENT
Start: 2024-06-24 | End: 2024-06-24

## 2024-06-24 RX ORDER — IBUPROFEN 200 MG
1 TABLET ORAL 3 TIMES DAILY
Qty: 28 G | Refills: 0 | Status: SHIPPED | OUTPATIENT
Start: 2024-06-24

## 2024-06-24 RX ORDER — HYDROCODONE BITARTRATE AND ACETAMINOPHEN 5; 325 MG/1; MG/1
1 TABLET ORAL EVERY 6 HOURS PRN
Qty: 8 TABLET | Refills: 0 | Status: CANCELLED | OUTPATIENT
Start: 2024-06-24

## 2024-06-24 RX ORDER — HYDROCODONE BITARTRATE AND ACETAMINOPHEN 5; 325 MG/1; MG/1
1 TABLET ORAL EVERY 6 HOURS PRN
Qty: 9 TABLET | Refills: 0 | Status: SHIPPED | OUTPATIENT
Start: 2024-06-24 | End: 2024-06-27

## 2024-06-24 RX ADMIN — HYDROCODONE BITARTRATE AND ACETAMINOPHEN 1 TABLET: 5; 325 TABLET ORAL at 15:30

## 2024-06-24 RX ADMIN — BACITRACIN ZINC, NEOMYCIN, POLYMYXIN B 1 APPLICATION: 400; 3.5; 5 OINTMENT TOPICAL at 15:33

## 2024-06-24 NOTE — ED PROVIDER NOTES
Subjective  History of Present Illness:    Chief Complaint: Burn to fingertips  History of Present Illness: 74-year-old female has a burn to her fingertips, she burned them on hot jelly at home just prior to arrival, she states that the jelly was approximately 400 °F  Onset: Sudden  Duration: Just prior to arrival  Exacerbating / Alleviating factors: Painful to touch  Associated symptoms: No other reported injuries      Nurses Notes reviewed and agree, including vitals, allergies, social history and prior medical history.     REVIEW OF SYSTEMS: All systems reviewed and not pertinent unless noted.    Review of Systems   Skin:         Burn   All other systems reviewed and are negative.      Past Medical History:   Diagnosis Date    Allergic rhinitis     Arthritis     Diabetes mellitus     Disease of thyroid gland     GERD (gastroesophageal reflux disease)     Hyperlipidemia     Hypertension     Rheumatic fever        Allergies:    Codeine and Nubain [nalbuphine]      Past Surgical History:   Procedure Laterality Date    BREAST BIOPSY Left     x 10 yrs ago    BREAST SURGERY      CHOLECYSTECTOMY      KNEE SURGERY      SHOULDER SURGERY      TUBAL ABDOMINAL LIGATION      WRIST SURGERY           Social History     Socioeconomic History    Marital status:    Tobacco Use    Smoking status: Some Days     Current packs/day: 1.00     Types: Electronic Cigarette, Cigarettes    Smokeless tobacco: Never    Tobacco comments:     Pt stated that she does vapor cigarette   Substance and Sexual Activity    Alcohol use: No    Drug use: No    Sexual activity: Defer         Family History   Problem Relation Age of Onset    Arthritis Other     Thyroid disease Other     Diabetes Other     Obesity Other     Kidney disease Other     Liver disease Other     No Known Problems Mother     No Known Problems Father     No Known Problems Sister     No Known Problems Brother     Breast cancer Maternal Aunt        Objective  Physical Exam:  BP  "177/82 (BP Location: Left arm, Patient Position: Sitting)   Pulse 92   Temp 97.6 °F (36.4 °C) (Oral)   Resp 19   Ht 162.6 cm (64\")   Wt 79.4 kg (175 lb)   SpO2 96%   BMI 30.04 kg/m²      Physical Exam  Vitals and nursing note reviewed.   Constitutional:       Appearance: She is well-developed.   HENT:      Head: Normocephalic and atraumatic.   Pulmonary:      Effort: Pulmonary effort is normal.   Musculoskeletal:        Hands:       Cervical back: Normal range of motion and neck supple.      Comments: Erythematous, tender to touch, no obvious blistering   Neurological:      Mental Status: She is alert and oriented to person, place, and time.      Deep Tendon Reflexes: Reflexes are normal and symmetric.           Procedures    ED Course:    ED Course as of 06/24/24 1714 Mon Jun 24, 2024 1628 I have reviewed the mid-level provider(s) note and verbally discussed the case/plan of care.  I was available for consultation as needed at all times during the patient's visit in the Emergency Department.  I agree with the clinical impression, plan, and disposition unless otherwise stated in the MDM below.    ATTENDING ATTESTATION  HPI: 74 year old female presents with burns to fingertips just prior to arrival.    MDM: No clinical indication for emergent transfer to burn center. Rx supportive therapy.     [JS]      ED Course User Index  [JS] Cristian Dumont DO       Lab Results (last 24 hours)       ** No results found for the last 24 hours. **             No radiology results from the last 24 hrs       Medical Decision Making  Problems Addressed:  Superficial burn of multiple fingers of right hand excluding thumb, initial encounter: complicated acute illness or injury    Amount and/or Complexity of Data Reviewed  External Data Reviewed: notes.    Risk  OTC drugs.  Prescription drug management.          Final diagnoses:   Superficial burn of multiple fingers of right hand excluding thumb, initial encounter "           Disposition DISCHARGE    Patient discharged in stable condition.    Reviewed implications of results, diagnosis, meds, responsibility to follow up, warning signs and symptoms of possible worsening, potential complications and reasons to return to ER.    Patient/Family voiced understanding of above instructions.    Discussed plan for discharge, as there is no emergent indication for admission.  Pt/family is agreeable and understands need for follow up and possible repeat testing.  Pt/family is aware that discharge does not mean that nothing is wrong but that it indicates no emergency is currently present that requires admission and they must continue care with follow-up as given below or with a physician of their choice.     FOLLOW-UP  [facility] EMERGENCY DEPARTMENT  [address] [facility] [address] [phone]    If symptoms worsen         Medication List        New Prescriptions      HYDROcodone-acetaminophen 5-325 MG per tablet  Commonly known as: NORCO  Take 1 tablet by mouth Every 6 (Six) Hours As Needed for Moderate Pain or Severe Pain for up to 3 days.     neomycin-bacitracin-polymyxin 5-400-5000 ointment  Apply 1 Application topically to the appropriate area as directed 3 (Three) Times a Day.               Where to Get Your Medications        These medications were sent to [facility] [address] [place] [address] - [address] [place] [address] - [phone]  - [phone]   [address] [address]      Phone: [phone]   HYDROcodone-acetaminophen 5-325 MG per tablet  neomycin-bacitracin-polymyxin 5-400-5000 ointment             Theodore Berman Jr., CABRERA  06/24/24 9690

## 2024-07-03 ENCOUNTER — HOSPITAL ENCOUNTER (OUTPATIENT)
Dept: MRI IMAGING | Facility: HOSPITAL | Age: 74
Discharge: HOME | End: 2024-07-03
Admitting: INTERNAL MEDICINE
Payer: COMMERCIAL

## 2024-07-03 DIAGNOSIS — M79.641 PAIN IN RIGHT HAND: ICD-10-CM

## 2024-07-03 PROCEDURE — 0 GADOBENATE DIMEGLUMINE 529 MG/ML SOLUTION: Performed by: INTERNAL MEDICINE

## 2024-07-03 PROCEDURE — A9577 INJ MULTIHANCE: HCPCS | Performed by: INTERNAL MEDICINE

## 2024-07-03 PROCEDURE — 73220 MRI UPPR EXTREMITY W/O&W/DYE: CPT

## 2024-07-03 RX ADMIN — GADOBENATE DIMEGLUMINE 15 ML: 529 INJECTION, SOLUTION INTRAVENOUS at 19:07

## 2024-07-24 ENCOUNTER — TRANSCRIBE ORDERS (OUTPATIENT)
Dept: ADMINISTRATIVE | Facility: HOSPITAL | Age: 74
End: 2024-07-24
Payer: COMMERCIAL

## 2024-07-24 DIAGNOSIS — R11.2 NAUSEA AND VOMITING, UNSPECIFIED VOMITING TYPE: Primary | ICD-10-CM

## 2024-08-15 ENCOUNTER — OFFICE VISIT (OUTPATIENT)
Age: 74
End: 2024-08-15
Payer: COMMERCIAL

## 2024-08-15 VITALS
BODY MASS INDEX: 29.41 KG/M2 | HEIGHT: 64 IN | SYSTOLIC BLOOD PRESSURE: 144 MMHG | WEIGHT: 172.3 LBS | DIASTOLIC BLOOD PRESSURE: 90 MMHG

## 2024-08-15 DIAGNOSIS — M79.642 LEFT HAND PAIN: ICD-10-CM

## 2024-08-15 DIAGNOSIS — M65.331 TRIGGER MIDDLE FINGER OF RIGHT HAND: ICD-10-CM

## 2024-08-15 DIAGNOSIS — M65.341 TRIGGER RING FINGER OF RIGHT HAND: ICD-10-CM

## 2024-08-15 DIAGNOSIS — Z01.818 PREOPERATIVE CLEARANCE: Primary | ICD-10-CM

## 2024-08-15 DIAGNOSIS — G56.01 CARPAL TUNNEL SYNDROME OF RIGHT WRIST: ICD-10-CM

## 2024-08-15 DIAGNOSIS — M79.641 RIGHT HAND PAIN: ICD-10-CM

## 2024-08-15 RX ORDER — GABAPENTIN 100 MG/1
100 CAPSULE ORAL 3 TIMES DAILY
Qty: 100 CAPSULE | Refills: 0 | Status: SHIPPED | OUTPATIENT
Start: 2024-08-15

## 2024-08-15 RX ORDER — MULTIVITAMIN WITH IRON
50 TABLET ORAL DAILY
COMMUNITY

## 2024-08-15 NOTE — PROGRESS NOTES
[facility]     Office Visit       Date: 08/15/2024   Patient Name: Reema Brothers  MRN: [id number]  YOB: 1950    Referring Physician: Referring, Self     Chief Complaint:   Chief Complaint   Patient presents with    Right Hand - Pain       History of Present Illness:   Reema Brothers is a 74 y.o. female right-hand-dominant presents with right hand pain numbness and tingling of approximately 8 months duration.  Patient reports that she was lifting sewing machine in November 2023 and she noticed a pop in her right hand with immediate pain radiating into her right thumb.  She reports that she has had progressive pain swelling and stiffness of her right hand and thumb since then.  She has tried hand therapy bracing anti-inflammatories with minimal improvement.  She reports numbness and tingling that occasionally happens at night and also during the day.  She reports she is unable to make a fist has not been able to it for over 6 months.  She had previous corticosteroid injection with minimal improvements in her symptoms.  She also reports catching locking of right middle and ring fingers that is worse in the morning she often has to straighten them herself.  She had an MRI done with concern for partial attritional rupture of the FPL of the thumb.  She has a history of diabetes.  She denies smoking.  She is not working.      Subjective   Review of Systems:   Review of Systems   All other systems reviewed and are negative.       Pertinent review of systems per HPI.     I reviewed the patient's chief complaint, history of present illness, review of systems, past medical history, surgical history, family history, social history, medications and allergy list in the EMR on 08/15/2024 and agree with the findings above.    Objective    Vital Signs:   Vitals:    08/15/24 0854   BP: 144/90   Weight: 78.2 kg (172 lb 4.8 oz)  "  Height: 161.3 cm (63.5\")     BMI: Body mass index is 30.04 kg/m².    General Appearance: No acute distress. Alert and oriented.     Chest:  Non-labored breathing on room air. Regular rate and rhythm.    Upper Extremity Exam:    Palpation of the right wrist as well as the palm of the hand.  Positive Tinel at the right carpal tunnel.  Positive carpal compression test.      Tender to palpation over the right thumb CMC with positive CMC shuck test.  Nontender at the A1 pulley of the right thumb.  Patient is able to flex at the IP joint of the thumb with minimal pain.    Tender palpation over the A1 pulleys of the right ring and middle finger with palpable nodules.  There is no catching or locking with flexion at the IP joint.    Patient is unable to make a composite fist due to stiffness.  She has evidence of both extrinsic stiffness as well as capsular tightness at the MCP and the PIPs of all digits of the hand    Fingers are warm, well-perfused with appropriate capillary refill.  Palpable radial pulse.    Sensation intact to light touch in median, radial and ulnar nerve distributions.    Motor- Fires FPL, ulnar intrinsics, EPL/EDC w/ full active and passive range of motion. Strength intact.    Non-tender except for in the areas highlighted    Imaging/Studies:   Imaging Results (Last 24 Hours)       Procedure Component Value Units Date/Time    XR Hand 3+ View Right [[id number]] Resulted: 08/15/24 0913     Updated: 08/15/24 0914    Narrative:      Right Hand X-Ray    Indication: Pain    Views:  AP, Lateral, and Oblique     Comparison:  None    Findings:  No fracture  No bony lesion  Normal soft tissues  Changes of the right wrist, thumb CMC as well as the small joints of the   right hand.    Impression:   Moderate to severe degenerative changes of the right wrist and hand.              X-ray of the right hand were independently reviewed and interpreted myself.  She has degenerative changes on x-ray as well as evidence " of potential partial FPL tear on MRI.    Procedures:  Procedures    Quality Measures:   ACP:   ACP discussion was deferred.    Tobacco:   Reema Brothers  reports that she has quit smoking. Her smoking use included electronic cigarette and cigarettes. She started smoking about 57 years ago. She has never used smokeless tobacco.      Assessment / Plan    Assessment/Plan:     Diagnoses and all orders for this visit:    Left hand pain  -     XR Hand 3+ View Left    Other orders  -     cyanocobalamin (VITAMIN B-12N) 50 MCG tablet; Take 1 tablet by mouth Daily.         Reema Saini a 74 y.o. female who presents with:      ICD-10-CM ICD-9-CM   1. Preoperative clearance  Z01.818 V72.84   2. Left hand pain  M79.642 729.5   3. Right hand pain  M79.641 729.5   4. Carpal tunnel syndrome of right wrist  G56.01 354.0   5. Trigger middle finger of right hand  M65.331 727.03   6. Trigger ring finger of right hand  M65.341 727.03         Patient presents with evidence of right carpal tunnel syndrome as well as pain and stiffness of her right hand after feeling a pop November 2023.  She likely had a partial attritional rupture of her right thumb FPL based on her MRI findings.  Her thumb FPL does appear to be in continuity on exam however she continues to have significant pain in the carpal tunnel as well as relative pain and stiffness in all the fingers of her hand.  She also has evidence of trigger digits of the right ring and middle finger.  She has tried anti-inflammatories, hand therapy and previous corticosteroid injection with no improvement.  Given these findings would recommend right carpal tunnel release and expiration of her thumb FPL through an extended approach.  Based on these findings she may warrant FPL repair, reconstruction or tendon transfer depending on the intraoperative findings.  Also recommend right middle and ring finger trigger release at the same time.  I discussed with the patient that she has  significant extrinsic and intrinsic tightness of the right hand fingers that will not improve with this surgery although we may be able to improve her carpal tunnel pain as well as the triggering of her middle and ring finger digits.  The patient understands this and would like to proceed with surgery    Consent-right extended carpal tunnel release, ring and middle finger trigger release and possible FPL reconstruction versus tendon transfer.  All indicated procedures    The risks and benefits of the procedure were discussed with the patient and or appropriate guardian, which include but are not limited to the risk of bleeding, infection, neurovascular damage, post-operative stiffness, recurrence, tendon and/or ligament retears, recurrent instability, continued pain, arthritic pain, need for further revision surgeries in the future, deep venous thrombosis, and general risks from anesthesia. We also discussed the post-operative rehabilitation, the need for physical therapy, and the overall expected outcomes from the procedure. We also discussed the possible use of biologics including allograft. We allowed proper time and answered the patient's questions regarding the procedure. The patient expressed understanding. Knowing what the risks are and what the conservative treatment is, the patient elected to forgo any further conservative treatment options and proceed with the surgical intervention.      Follow Up:   Return for Follow Up after Post Op.        Raj Shoemaker MD  [facility] [place] [facility] [place] [facility]

## 2024-08-27 ENCOUNTER — PRE-ADMISSION TESTING (OUTPATIENT)
Dept: PREADMISSION TESTING | Facility: HOSPITAL | Age: 74
End: 2024-08-27
Payer: COMMERCIAL

## 2024-08-27 DIAGNOSIS — Z01.818 PREOPERATIVE CLEARANCE: ICD-10-CM

## 2024-08-27 LAB
ANION GAP SERPL CALCULATED.3IONS-SCNC: 12 MMOL/L (ref 5–15)
BUN SERPL-MCNC: 18 MG/DL (ref 8–23)
BUN/CREAT SERPL: 23.4 (ref 7–25)
CALCIUM SPEC-SCNC: 10.1 MG/DL (ref 8.6–10.5)
CHLORIDE SERPL-SCNC: 104 MMOL/L (ref 98–107)
CO2 SERPL-SCNC: 23 MMOL/L (ref 22–29)
CREAT SERPL-MCNC: 0.77 MG/DL (ref 0.57–1)
DEPRECATED RDW RBC AUTO: 46.1 FL (ref 37–54)
EGFRCR SERPLBLD CKD-EPI 2021: 81.6 ML/MIN/1.73
ERYTHROCYTE [DISTWIDTH] IN BLOOD BY AUTOMATED COUNT: 13.1 % (ref 12.3–15.4)
GLUCOSE SERPL-MCNC: 137 MG/DL (ref 65–99)
HBA1C MFR BLD: 6.8 % (ref 4.8–5.6)
HCT VFR BLD AUTO: 40 % (ref 34–46.6)
HGB BLD-MCNC: 13.1 G/DL (ref 12–15.9)
MCH RBC QN AUTO: 31.6 PG (ref 26.6–33)
MCHC RBC AUTO-ENTMCNC: 32.8 G/DL (ref 31.5–35.7)
MCV RBC AUTO: 96.4 FL (ref 79–97)
PLATELET # BLD AUTO: 264 10*3/MM3 (ref 140–450)
PMV BLD AUTO: 10.5 FL (ref 6–12)
POTASSIUM SERPL-SCNC: 4.7 MMOL/L (ref 3.5–5.2)
QT INTERVAL: 374 MS
QTC INTERVAL: 431 MS
RBC # BLD AUTO: 4.15 10*6/MM3 (ref 3.77–5.28)
SODIUM SERPL-SCNC: 139 MMOL/L (ref 136–145)
WBC NRBC COR # BLD AUTO: 8.48 10*3/MM3 (ref 3.4–10.8)

## 2024-08-27 PROCEDURE — 83036 HEMOGLOBIN GLYCOSYLATED A1C: CPT

## 2024-08-27 PROCEDURE — 85027 COMPLETE CBC AUTOMATED: CPT

## 2024-08-27 PROCEDURE — 36415 COLL VENOUS BLD VENIPUNCTURE: CPT

## 2024-08-27 PROCEDURE — 93005 ELECTROCARDIOGRAM TRACING: CPT

## 2024-08-27 PROCEDURE — 80048 BASIC METABOLIC PNL TOTAL CA: CPT

## 2024-09-06 ENCOUNTER — OUTSIDE FACILITY SERVICE (OUTPATIENT)
Dept: ORTHOPEDIC SURGERY | Facility: CLINIC | Age: 74
End: 2024-09-06
Payer: COMMERCIAL

## 2024-09-06 ENCOUNTER — DOCUMENTATION (OUTPATIENT)
Dept: ORTHOPEDIC SURGERY | Facility: CLINIC | Age: 74
End: 2024-09-06

## 2024-09-06 DIAGNOSIS — Z98.890 POST-OPERATIVE STATE: Primary | ICD-10-CM

## 2024-09-06 PROCEDURE — 26055 INCISE FINGER TENDON SHEATH: CPT | Performed by: PLASTIC SURGERY

## 2024-09-06 PROCEDURE — 25295 RELEASE WRIST/FOREARM TENDON: CPT | Performed by: PLASTIC SURGERY

## 2024-09-06 PROCEDURE — 64721 CARPAL TUNNEL SURGERY: CPT | Performed by: PLASTIC SURGERY

## 2024-09-06 RX ORDER — HYDROCODONE BITARTRATE AND ACETAMINOPHEN 5; 325 MG/1; MG/1
1 TABLET ORAL EVERY 6 HOURS PRN
Qty: 20 TABLET | Refills: 0 | Status: SHIPPED | OUTPATIENT
Start: 2024-09-06

## 2024-09-06 NOTE — PROGRESS NOTES
DATE OF PROCEDURE: 09/06/2024    LOCATION: [facility]     PROCEDURES PERFORMED:    Right hand carpal tunnel release CPT 30687  Right hand flexor tenolysis of FDS and FDP to the index, middle, ring and small fingers CPT 25295 x8  Right middle finger trigger release CPT 31334  Right ring finger trigger finger release CPT 66159    SURGEON: Raj Shoemaker MD      ASSISTANTS:    1. None        * Surgery not found *      ANESTHESIA: MAC with peripheral nerve block    PREOPERATIVE DIAGNOSES:  Right hand carpal tunnel syndrome  Right hand stiffness  Right partial FPL rupture  Middle and ring finger trigger fingers     POSTOPERATIVE DIAGNOSES:    Right hand carpal tunnel syndrome  Synovitis in the carpal tunnel with scarring of the finger flexors  Intact FPL  Middle and ring finger trigger fingers     ESTIMATED BLOOD LOSS: 5 mL.    SPECIMENS: none    IMPLANTS: None    COMPLICATIONS: None     INDICATIONS:  Reema Brothers is a 74 y.o. female who initially presented with right hand pain, stiffness and evidence of carpal tunnel syndrome 10 months duration of her symptoms finger flexor strain last November.  She has evidence of carpal tunnel syndrome on exam as well as extrinsic flexor scarring.  She also had evidence of middle and ring finger trigger fingers.  Because of this the decision was made to take her to the operating room for carpal tunnel release, flexor tenolysis and right middle and ring finger trigger release.  The risks, benefits, alternatives and potential complications of surgery were discussed with the patient including but not limited to bleeding scarring, infection, recurrence, stiffness, damage to surrounding structures or postoperative pain.  The patient understands the risks and agreed to proceed with surgery.  A surgical informed consent was signed prior to the procedure.     DESCRIPTION OF PROCEDURE:      The patient was greeted in the pre-operative holding area and the  surgical site was marked and consent confirmed prior to bringing the patient to the operating room.  The patient was then taken to the operating room and a timeout was performed including the patient's name, procedure and antibiotic administration prior to the patient receiving general anesthesia.  The patient was positioned supine on the operative room table and a non-sterile tourniquet was applied to the right upper extremity and it was then prepped and draped in the usual sterile fashion.  The patient received the appropriate antibiotics within 1 hour of skin incision.     The right upper extremity was exsanguinated and the tourniquet set to 250 mmHg.      Attention was initially turned towards the right middle finger trigger finger.  An oblique incision was made at the pre-existing skin crease directly over the A1 pulley.  A combination of careful sharp and blunt dissection was used to carry the incision down to the level of the tendon sheath.  The digital neurovascular bundles were bluntly retracted and then the A1 pulley incised under direct visualization.  This was completed proximally distally using scissors under direct visualization.  Ragnell was then used to pull through both the FDS and FDP tendons individually to ensure that there is no catching.    Attention was then turned towards the right ring finger trigger finger.  An oblique incision was made at the pre-existing skin crease directly over the A1 pulley.  A combination of careful sharp and blunt dissection was used to carry the incision down to the level of the tendon sheath.  The digital neurovascular bundles were bluntly retracted and then the A1 pulley incised under direct visualization.  This was completed proximally distally using scissors under direct visualization.  Ragnell was then used to pull through both the FDS and FDP tendons individually to ensure that there is no catching.    Attention was then turned towards the right carpal tunnel.   An extended carpal tunnel incision was made at the level of the carpal tunnel in the palm extended proximally across the wrist in a zigzag fashion.  Sharp dissection was used to carry the incision through skin and superficial palmar fascia.  blunt dissection was used to expose the antebrachial fascia proximally.  The antebrachial fascia was then released with scissors dissection of the proximal to distal.  Distally this included release of the transverse carpal ligament in its entirety.  After completion of the carpal tunnel release we then turned to the contents of the carpal tunnel.    The median nerve is first identified with significant synovitis surrounding it.  Any synovitis surrounding the nerve was sharply excised, to and care to protect the nerve and its branches.  Next the superficial flexors were examined.  There is significant scar tissue surrounding both the FDS and FDP tendons at the carpal tunnel.  Systematically went through and performed tenolysis of the FDS fingers the index, middle, ring and small fingers until they were freed of all scarring carpal tunnel and no longer adhered to 1 another.  This was performed using sharp dissection and tenotomy scissors.  I then performed tenolysis of the FDP tendons to the index middle and ring and small fingers in similar fashion, excising all scar hands freeing the tendons from the adjacent scar tissue.  I then examined the FPL tendon and the carpal tunnel while gently retracting the median nerve.  The FPL tendon was found to be intact in its entire scar.  Because of this decision was made to avoid any graft reconstruction of the FPL tendon.    Tourniquet was let down and all wounds irrigated with copious amounts normal saline solution.  Hemostasis was achieved with bipolar electrocautery.  Skin was closed with 4-0 nylon in horizontal mattress fashion.  A soft dressing was applied.     At the end of the procedure the patient was awoken from anesthesia and  transferred to the PACU in stable condition.  I participated in all parts of the case.       POSTOPERATIVE PLAN:  Dressing down in 1 week  Immediate active range of motion of the fingers and wrist and the dressing  Patient to begin physical therapy next week to begin on active and passive range of motion of the right hand.  As needed Tylenol, ibuprofen and hydrocodone for pain.  Right upper extremity elevation.

## 2024-09-16 ENCOUNTER — OFFICE VISIT (OUTPATIENT)
Age: 74
End: 2024-09-16
Payer: COMMERCIAL

## 2024-09-16 VITALS — TEMPERATURE: 97.9 F

## 2024-09-16 DIAGNOSIS — Z98.890 POST-OPERATIVE STATE: Primary | ICD-10-CM

## 2024-09-16 PROCEDURE — 99024 POSTOP FOLLOW-UP VISIT: CPT | Performed by: PLASTIC SURGERY

## 2024-09-16 RX ORDER — LEVOTHYROXINE SODIUM 88 UG/1
1 TABLET ORAL DAILY
COMMUNITY
Start: 2024-08-19

## 2024-09-20 ENCOUNTER — TELEPHONE (OUTPATIENT)
Dept: ORTHOPEDIC SURGERY | Facility: CLINIC | Age: 74
End: 2024-09-20

## 2024-09-20 NOTE — TELEPHONE ENCOUNTER
Provider: RALPH    Caller: [facility]    Relationship to Patient: P.T.    Pharmacy:     Phone Number: [phone]    Reason for Call: [facility] BEREA CALLED TO ASK FOR THE ORDER FOR P.T. FOR THIS PT PLEASE FAX [phone]

## 2024-10-16 ENCOUNTER — HOSPITAL ENCOUNTER (OUTPATIENT)
Dept: ULTRASOUND IMAGING | Facility: HOSPITAL | Age: 74
Discharge: HOME | End: 2024-10-16
Payer: COMMERCIAL

## 2024-10-28 ENCOUNTER — OFFICE VISIT (OUTPATIENT)
Age: 74
End: 2024-10-28
Payer: COMMERCIAL

## 2024-10-28 DIAGNOSIS — Z98.890 POST-OPERATIVE STATE: Primary | ICD-10-CM

## 2024-10-28 PROCEDURE — 99024 POSTOP FOLLOW-UP VISIT: CPT | Performed by: PLASTIC SURGERY

## 2024-10-28 NOTE — PROGRESS NOTES
[facility]     Follow-up Office Visit       Date: 10/28/2024   Patient Name: Reema Brothers  MRN: [id number]  YOB: 1950    Chief Complaint:   Chief Complaint   Patient presents with    Post-op     6 week follow-up--7.5 weeks s/p right hand carpal tunnel release, Right hand flexor tenolysis of FDS and FDP to the index, middle, ring and small fingers, Right middle finger trigger release, Right ring finger trigger finger release (9/6/24)       History of Present Illness:   Reema Brothers is a 74 y.o. female [place] for regular follow-up status post right carpal tunnel release, flexor tenolysis and right middle and ring finger trigger release.  She is been doing well since her last visit.  Denies right hand pain.  She is been working with hand therapy.  Still unable to make a composite fist but overall has improved range of motion from preoperatively.      Subjective   Review of Systems:   Review of Systems   Constitutional: Negative.  Negative for chills, fatigue and fever.   HENT: Negative.  Negative for congestion and dental problem.    Eyes: Negative.  Negative for blurred vision.   Respiratory: Negative.  Negative for shortness of breath.    Cardiovascular: Negative.  Negative for leg swelling.   Gastrointestinal: Negative.  Negative for abdominal pain.   Endocrine: Negative.  Negative for polyuria.   Genitourinary: Negative.  Negative for difficulty urinating.   Musculoskeletal:  Positive for arthralgias.   Skin: Negative.    Allergic/Immunologic: Negative.    Neurological: Negative.    Hematological: Negative.  Negative for adenopathy.   Psychiatric/Behavioral: Negative.  Negative for behavioral problems.         Pertinent review of systems per HPI    I reviewed the patient's chief complaint, history of present illness, review of systems, past medical history, surgical history, family history, social history,  medications and allergy list in the EMR on 10/28/2024 and agree with the findings above.    Objective    Vital Signs: There were no vitals filed for this visit.  BMI: There is no height or weight on file to calculate BMI.     General Appearance: No acute distress. Alert and oriented.     Chest:  Non-labored breathing on room air      Ortho Exam:  Right wrist and trigger finger incisions are well-healed.  Patient approximately 5 mm tip to palm with flexion.  Full extension of all 5 fingers.  Able to passively flex the fingers into the palm although still limited in passive flexion compared to the contralateral hand.  Mild intrinsic tightness with Tinel test particular of the middle and ring fingers.  Fingers warm and well-perfused distally  Sensation intact to light touch in the median, radial and ulnar nerve distributions    Imaging/Studies:   Imaging Results (Last 24 Hours)       ** No results found for the last 24 hours. **              Procedures:  Procedures    Quality Measures:   ACP:   ACP discussion was deferred.    Tobacco:   Reema Namntosh  reports that she has quit smoking. Her smoking use included electronic cigarette and cigarettes. She started smoking about 57 years ago. She has never used smokeless tobacco.    Assessment / Plan    Assessment/Plan:      Diagnosis Plan   1. Post-operative state            Patient presents for 8-week follow-up status post right hand carpal tunnel release, flexor tenolysis and middle and ring finger trigger release.  She is doing well postoperatively and has resolution of her carpal tunnel pain after surgery.  She does continue to have some intrinsic tightness as well as capsular tightness that is limiting her active flexion.  She is unable to make a composite fist however is improving with hand therapy.  Recommend continue hand therapy for an additional month.  Recommend continue home exercise program.  Follow-up in 3 months for repeat exam.    Follow Up:   Return in  about 3 months (around 1/28/2025).        Raj Shoemaker MD  [facility] Hand and Upper Extremity Surgeon

## 2025-01-09 ENCOUNTER — TRANSCRIBE ORDERS (OUTPATIENT)
Dept: ADMINISTRATIVE | Facility: HOSPITAL | Age: 75
End: 2025-01-09
Payer: COMMERCIAL

## 2025-01-09 DIAGNOSIS — S93.602A UNSPECIFIED SPRAIN OF LEFT FOOT, INITIAL ENCOUNTER: Primary | ICD-10-CM

## 2025-01-30 ENCOUNTER — OFFICE VISIT (OUTPATIENT)
Age: 75
End: 2025-01-30
Payer: COMMERCIAL

## 2025-01-30 VITALS — BODY MASS INDEX: 30.04 KG/M2 | SYSTOLIC BLOOD PRESSURE: 132 MMHG | DIASTOLIC BLOOD PRESSURE: 84 MMHG | HEIGHT: 64 IN

## 2025-01-30 DIAGNOSIS — M62.40 INTRINSIC MUSCLE TIGHTNESS: Primary | ICD-10-CM

## 2025-01-30 RX ORDER — CLOTRIMAZOLE AND BETAMETHASONE DIPROPIONATE 10; .64 MG/G; MG/G
CREAM TOPICAL
COMMUNITY
Start: 2024-12-03

## 2025-01-30 RX ORDER — TRIAMCINOLONE ACETONIDE 1 MG/G
CREAM TOPICAL
COMMUNITY
Start: 2024-12-03

## 2025-01-30 RX ORDER — FLUCONAZOLE 150 MG/1
1 TABLET ORAL DAILY
COMMUNITY
Start: 2024-12-04

## 2025-01-30 NOTE — PROGRESS NOTES
"                                                                 [facility]     Follow-up Office Visit       Date: 01/30/2025   Patient Name: Reema Brothers  MRN: [id number]  YOB: 1950    Chief Complaint:   Chief Complaint   Patient presents with    Follow-up     3 month follow up-4.5 months s/p right hand carpal tunnel release, Right hand flexor tenolysis of FDS and FDP to the index, middle, ring and small fingers, Right middle finger trigger release, Right ring finger trigger finger release (9/6/24)       History of Present Illness:   Reema Brothers is a 74 y.o. female for follow-up of the above procedure.  She has been doing well since her last visit.  Denies pain.  She reports she has persistent stiffness in her right hand.  She reports that she is able to passively get her hand into a fist however actively is unable to make a full fist with her index middle ring finger.  Has completed hand therapy.      Subjective   Review of Systems:   Review of Systems   All other systems reviewed and are negative.       Pertinent review of systems per HPI    I reviewed the patient's chief complaint, history of present illness, review of systems, past medical history, surgical history, family history, social history, medications and allergy list in the EMR on 01/30/2025 and agree with the findings above.    Objective    Vital Signs:   Vitals:    01/30/25 0923   BP: 132/84   Weight: Comment: pt unable to weightbare   Height: 161.3 cm (63.5\")     BMI: Body mass index is 30.04 kg/m².     General Appearance: No acute distress. Alert and oriented.     Chest:  Non-labored breathing on room air      Ortho Exam:  Right wrist and trigger finger incisions are well-healed.  Patient approximately 5-10 mm tip to palm with active flexion.  Able to passively flex the fingers into a full fist. full extension of all 5 fingers.  Patient has approximately 90 degrees passive flexion of the index middle and " ring finger PIPs.  Him degrees flexion of the small finger PIP.  intrinsic tightness with bunnel test particular of the index middle and ring fingers.  Fingers warm and well-perfused distally  Sensation intact to light touch in the median, radial and ulnar nerve distributions    Imaging/Studies:   Imaging Results (Last 24 Hours)       ** No results found for the last 24 hours. **                Procedures:  Procedures    Quality Measures:   ACP:   ACP discussion was deferred.    Tobacco:   Reema Brothers  reports that she has quit smoking. Her smoking use included electronic cigarette and cigarettes. She started smoking about 58 years ago. She has never used smokeless tobacco.    Assessment / Plan    Assessment/Plan:      Diagnosis Plan   1. Intrinsic muscle tightness  External Facility Surgical/Procedural Request          Patient presents for follow-up status post carpal tunnel release and flexor tenolysis.  She is doing well and has maximized her hand therapy.  She is still unable to make an composite fist due to the distal intrinsic tightness particular of the index middle and ring fingers.  Discussed further treatment options at this time including continued observation as well as distal intrinsic release.  The patient would like to proceed with distal intrinsic release.  She understands she will not have full range of motion however expect we will regain another 1 cm of flexion.  Discussed that she will need significant hand therapy after surgery.  She understands this and would like to proceed with the procedure    Consent-right index middle and ring finger distal intrinsic release    The risks and benefits of the procedure were discussed with the patient and or appropriate guardian, which include but are not limited to the risk of bleeding, infection, neurovascular damage, post-operative stiffness, recurrence, tendon and/or ligament retears, recurrent instability, continued pain, arthritic pain, need for  further revision surgeries in the future, deep venous thrombosis, and general risks from anesthesia. We also discussed the post-operative rehabilitation, the need for physical therapy, and the overall expected outcomes from the procedure. We also discussed the possible use of biologics including allograft. We allowed proper time and answered the patient's questions regarding the procedure. The patient expressed understanding. Knowing what the risks are and what the conservative treatment is, the patient elected to forgo any further conservative treatment options and proceed with the surgical intervention.      Follow Up:   Return for Follow Up after Post Op.        Raj Shoemaker MD  [facility] Hand and Upper Extremity Surgeon

## 2025-03-18 ENCOUNTER — OFFICE VISIT (OUTPATIENT)
Age: 75
End: 2025-03-18
Payer: COMMERCIAL

## 2025-03-18 VITALS
TEMPERATURE: 97.3 F | SYSTOLIC BLOOD PRESSURE: 128 MMHG | HEIGHT: 64 IN | WEIGHT: 176 LBS | BODY MASS INDEX: 30.05 KG/M2 | DIASTOLIC BLOOD PRESSURE: 79 MMHG | OXYGEN SATURATION: 95 % | HEART RATE: 81 BPM

## 2025-03-18 DIAGNOSIS — E78.5 DYSLIPIDEMIA: ICD-10-CM

## 2025-03-18 DIAGNOSIS — I10 PRIMARY HYPERTENSION: ICD-10-CM

## 2025-03-18 DIAGNOSIS — E11.69 TYPE 2 DIABETES MELLITUS WITH OTHER SPECIFIED COMPLICATION, WITHOUT LONG-TERM CURRENT USE OF INSULIN: ICD-10-CM

## 2025-03-18 DIAGNOSIS — E11.65 TYPE 2 DIABETES MELLITUS WITH HYPERGLYCEMIA, WITHOUT LONG-TERM CURRENT USE OF INSULIN: Primary | ICD-10-CM

## 2025-03-18 LAB
EXPIRATION DATE: ABNORMAL
EXPIRATION DATE: NORMAL
HBA1C MFR BLD: 7.3 % (ref 4.5–5.7)
Lab: ABNORMAL
Lab: NORMAL
POC ALBUMIN, URINE: 10 MG/L
POC CREATININE, URINE: 50 MG/DL
POC URINE ALB/CREA RATIO: NORMAL

## 2025-03-18 RX ORDER — LOVASTATIN 40 MG/1
40 TABLET ORAL DAILY
Qty: 90 TABLET | Refills: 3 | Status: SHIPPED | OUTPATIENT
Start: 2025-03-18

## 2025-03-18 RX ORDER — GLIMEPIRIDE 4 MG/1
4 TABLET ORAL 2 TIMES DAILY
Qty: 180 TABLET | Refills: 3 | Status: SHIPPED | OUTPATIENT
Start: 2025-03-18

## 2025-03-18 RX ORDER — LISINOPRIL 5 MG/1
5 TABLET ORAL DAILY
Qty: 90 TABLET | Refills: 3 | Status: SHIPPED | OUTPATIENT
Start: 2025-03-18

## 2025-03-18 NOTE — PROGRESS NOTES
New Patient Office Visit      Date: 2025  Patient Name: Reema Brothers  : 1950   MRN: [id number]     Chief Complaint:    Chief Complaint   Patient presents with    Establish Care     Medication refill        History of Present Illness: Reema Brothers is a 74 y.o. female who is here today for establishment of care.  Has a h/o DM and hypothyroid.    States that her DM has been elevated.  Reports her previous physician was making some changes with hopes of improving her diabetes.          Subjective      Review of Systems:   Review of Systems   Constitutional:  Negative for appetite change and unexpected weight loss.   HENT:  Negative for trouble swallowing.    Eyes:  Negative for blurred vision and double vision.   Respiratory:  Negative for cough and shortness of breath.    Cardiovascular:  Negative for chest pain and leg swelling.   Gastrointestinal:  Negative for blood in stool.   Endocrine: Negative for cold intolerance, heat intolerance and polyuria.   Musculoskeletal:  Negative for joint swelling.   Skin:  Negative for color change and bruise.   Neurological:  Negative for numbness and memory problem.   Hematological:  Does not bruise/bleed easily.   Psychiatric/Behavioral:  Negative for suicidal ideas and depressed mood. The patient is not nervous/anxious.        Past Medical History:   Past Medical History:   Diagnosis Date    Allergic rhinitis     Arthritis     Diabetes mellitus     Disease of thyroid gland     GERD (gastroesophageal reflux disease)     Hyperlipidemia     Hypertension     Rheumatic fever        Past Surgical History:   Past Surgical History:   Procedure Laterality Date    BREAST BIOPSY Left     x 10 yrs ago    BREAST SURGERY      CHOLECYSTECTOMY      HAND SURGERY Right 2024    Right hand carpal tunnel release, Right hand flexor tenolysis of FDS and FDP to the index, middle, ring and small fingers, Right middle finger trigger release, Right ring finger trigger  finger release-- Dr. Shoemaker    KNEE SURGERY      SHOULDER SURGERY      TUBAL ABDOMINAL LIGATION      WRIST SURGERY         Family History:   Family History   Problem Relation Age of Onset    Arthritis Other     Thyroid disease Other     Diabetes Other     Obesity Other     Kidney disease Other     Liver disease Other     No Known Problems Mother     No Known Problems Father     No Known Problems Sister     No Known Problems Brother     Breast cancer Maternal Aunt        Social History:   Social History     Socioeconomic History    Marital status:    Tobacco Use    Smoking status: Former     Current packs/day: 0.00     Average packs/day: 1 pack/day for 53.0 years (53.0 ttl pk-yrs)     Types: Electronic Cigarette, Cigarettes     Start date:      Quit date:      Years since quittin.2    Smokeless tobacco: Never    Tobacco comments:     Pt stated that she does vapor cigarette   Vaping Use    Vaping status: Former   Substance and Sexual Activity    Alcohol use: No    Drug use: No    Sexual activity: Defer       Medications:     Current Outpatient Medications:     Blood Glucose Monitoring Suppl (D-CARE GLUCOMETER) w/Device kit, 1 unit marking on U-100 syringe 3 (Three) Times a Day As Needed (as needed)., Disp: 1 kit, Rfl: 0    Cholecalciferol (VITAMIN D3) 400 UNITS capsule, Take 1 tablet by mouth daily., Disp: , Rfl:     clotrimazole-betamethasone (LOTRISONE) 1-0.05 % cream, APPLY CREAM TOPICALLY TO AFFECTED AND SURROUNDING AREAS TWICE DAILY IN THE MORNING AND IN THE EVENING FOR 2 WEEKS, Disp: , Rfl:     cyanocobalamin (VITAMIN B-12N) 50 MCG tablet, Take 1 tablet by mouth Daily., Disp: , Rfl:     glucose blood test strip, Use as instructed, Disp: 100 each, Rfl: 12    Lancets misc, Use as directed, Disp: 100 each, Rfl: 12    levothyroxine (SYNTHROID, LEVOTHROID) 88 MCG tablet, Take 1 tablet by mouth Daily., Disp: , Rfl:     lisinopril (PRINIVIL,ZESTRIL) 5 MG tablet, Take 1 tablet by mouth Daily., Disp: 90  "tablet, Rfl: 3    lovastatin (MEVACOR) 40 MG tablet, Take 1 tablet by mouth Daily., Disp: 90 tablet, Rfl: 3    metFORMIN (GLUCOPHAGE) 1000 MG tablet, Take 1 tablet by mouth twice daily, Disp: 180 tablet, Rfl: 2    glimepiride (Amaryl) 4 MG tablet, Take 1 tablet by mouth 2 (Two) Times a Day., Disp: 180 tablet, Rfl: 3    Allergies:   Allergies   Allergen Reactions    Codeine Anaphylaxis    Nubain [Nalbuphine] Other (See Comments)     Excessive sedation       Objective     Physical Exam:  Vital Signs:   Vitals:    03/18/25 0903   BP: 128/79   Pulse: 81   Temp: 97.3 °F (36.3 °C)   SpO2: 95%   Weight: 79.8 kg (176 lb)   Height: 161.3 cm (63.5\")     Body mass index is 30.69 kg/m².      Physical Exam  Vitals and nursing note reviewed.   Constitutional:       Appearance: Normal appearance.   HENT:      Head: Normocephalic and atraumatic.   Eyes:      General: Lids are normal.      Conjunctiva/sclera: Conjunctivae normal.   Cardiovascular:      Rate and Rhythm: Normal rate and regular rhythm.   Pulmonary:      Effort: Pulmonary effort is normal.      Breath sounds: Normal breath sounds and air entry.   Abdominal:      General: Abdomen is flat. Bowel sounds are normal.      Palpations: Abdomen is soft.   Musculoskeletal:      Cervical back: Full passive range of motion without pain and normal range of motion.   Neurological:      General: No focal deficit present.      Mental Status: She is alert and oriented to person, place, and time.   Psychiatric:         Attention and Perception: Attention normal.         Mood and Affect: Mood normal.         Behavior: Behavior normal. Behavior is cooperative.         POCT Results (if applicable):   Results for orders placed or performed in visit on 03/18/25   POC Glycosylated Hemoglobin (Hb A1C)    Collection Time: 03/18/25  9:37 AM    Specimen: Blood   Result Value Ref Range    Hemoglobin A1C 7.3 (A) 4.5 - 5.7 %    Lot Number [id number]     Expiration Date 11/06/2026    POC " Albumin/Creatinine Ratio Urine    Collection Time: 03/18/25  9:38 AM    Specimen: Urine   Result Value Ref Range    POC ALBUMIN, URINE 10 mg/L    POC CREATININE, URINE 50 mg/dL    POC Urine Albumin Creatinine Ratio  <30    Lot Number [id number]     Expiration Date 11/30/2025             Assessment / Plan      Assessment/Plan:   Diagnoses and all orders for this visit:    1. Type 2 diabetes mellitus with hyperglycemia, without long-term current use of insulin (Primary)  -     POC Glycosylated Hemoglobin (Hb A1C)  -     POC Albumin/Creatinine Ratio Urine  -     glimepiride (Amaryl) 4 MG tablet; Take 1 tablet by mouth 2 (Two) Times a Day.  Dispense: 180 tablet; Refill: 3    2. Dyslipidemia  -     lovastatin (MEVACOR) 40 MG tablet; Take 1 tablet by mouth Daily.  Dispense: 90 tablet; Refill: 3    3. Type 2 diabetes mellitus with other specified complication, without long-term current use of insulin    4. Primary hypertension  -     lisinopril (PRINIVIL,ZESTRIL) 5 MG tablet; Take 1 tablet by mouth Daily.  Dispense: 90 tablet; Refill: 3              Follow Up:   Return in about 3 months (around 6/18/2025) for Annual physical.          Ronaldo Gill,   [facility]

## 2025-04-09 ENCOUNTER — OFFICE VISIT (OUTPATIENT)
Age: 75
End: 2025-04-09
Payer: COMMERCIAL

## 2025-04-09 VITALS
DIASTOLIC BLOOD PRESSURE: 72 MMHG | HEIGHT: 64 IN | BODY MASS INDEX: 30.15 KG/M2 | HEART RATE: 84 BPM | TEMPERATURE: 98 F | SYSTOLIC BLOOD PRESSURE: 128 MMHG | WEIGHT: 176.6 LBS | OXYGEN SATURATION: 97 %

## 2025-04-09 DIAGNOSIS — B96.89 ACUTE BACTERIAL RHINOSINUSITIS: Primary | ICD-10-CM

## 2025-04-09 DIAGNOSIS — J01.90 ACUTE BACTERIAL RHINOSINUSITIS: Primary | ICD-10-CM

## 2025-04-09 DIAGNOSIS — H66.002 NON-RECURRENT ACUTE SUPPURATIVE OTITIS MEDIA OF LEFT EAR WITHOUT SPONTANEOUS RUPTURE OF TYMPANIC MEMBRANE: ICD-10-CM

## 2025-04-09 PROCEDURE — 99213 OFFICE O/P EST LOW 20 MIN: CPT | Performed by: STUDENT IN AN ORGANIZED HEALTH CARE EDUCATION/TRAINING PROGRAM

## 2025-04-09 RX ORDER — GABAPENTIN 100 MG/1
1 CAPSULE ORAL 3 TIMES DAILY
COMMUNITY

## 2025-04-09 RX ORDER — FAMOTIDINE 40 MG/1
TABLET, FILM COATED ORAL
COMMUNITY
Start: 2024-12-03

## 2025-04-09 RX ORDER — TRIAMCINOLONE ACETONIDE 1 MG/G
CREAM TOPICAL
COMMUNITY

## 2025-04-09 RX ORDER — FLUCONAZOLE 100 MG/1
100 TABLET ORAL
Qty: 2 TABLET | Refills: 0 | Status: SHIPPED | OUTPATIENT
Start: 2025-04-09

## 2025-04-11 ENCOUNTER — OUTSIDE FACILITY SERVICE (OUTPATIENT)
Dept: ORTHOPEDIC SURGERY | Facility: CLINIC | Age: 75
End: 2025-04-11
Payer: COMMERCIAL

## 2025-04-11 ENCOUNTER — DOCUMENTATION (OUTPATIENT)
Age: 75
End: 2025-04-11
Payer: COMMERCIAL

## 2025-04-11 DIAGNOSIS — Z98.890 POST-OPERATIVE STATE: Primary | ICD-10-CM

## 2025-04-11 RX ORDER — OXYCODONE HYDROCHLORIDE 5 MG/1
5 TABLET ORAL EVERY 6 HOURS PRN
Qty: 12 TABLET | Refills: 0 | Status: SHIPPED | OUTPATIENT
Start: 2025-04-11

## 2025-04-11 NOTE — PROGRESS NOTES
DATE OF PROCEDURE: 04/11/2025    LOCATION: [facility]     PROCEDURES PERFORMED:    Right index finger distal intrinsic release CPT 28109  Right middle finger distal intrinsic release CPT 30546  Right ring finger distal intrinsic release CPT 03270    SURGEON: Raj Shoemaker MD      ASSISTANTS:      None          * Surgery not found *      ANESTHESIA: General    PREOPERATIVE DIAGNOSES:  Right hand intrinsic tightness of index middle and ring finger     POSTOPERATIVE DIAGNOSES:    Same     ESTIMATED BLOOD LOSS: 1 mL.    SPECIMENS: None    IMPLANTS: None    COMPLICATIONS: None     INDICATIONS:  Reema Brothers is a 75 y.o. female who initially presented with intrinsic tightness of the right index middle and ring finger after history of remote right hand.  The patient had plateaued with hand therapy and had persistent significant intrinsic tightness limiting use of her right hand..  The risks, benefits, alternatives and potential complications of surgery were discussed with the patient including but not limited to bleeding scarring, infection, recurrence, stiffness, damage to surrounding structures or postoperative pain.  The patient understands the risks and agreed to proceed with surgery.  A surgical informed consent was signed prior to the procedure.     DESCRIPTION OF PROCEDURE:  The patient was greeted in the pre-operative holding area and the surgical site was marked and consent confirmed prior to bringing the patient to the operating room.  The patient was then taken to the operating room and a timeout was performed including the patient's name, procedure and antibiotic administration prior to patient receiving anesthesia.  The patient was positioned supine on the operative room table and a non-sterile tourniquet was applied to the right upper extremity and it was then prepped and draped in the usual sterile fashion.  Local blocks were performed with 1% lidocaine with epinephrine and  0.5% Marcaine plain.     The upper extremity was exsanguinated and the tourniquet set to 250 mmHg.     Longitudinal incisions were made over the right index middle and ring finger proximal phalanges.  Blunt dissection was used to carry the incision down to the level of the extensor mechanism.  The extensor mechanism of the index finger was identified.  The oblique fibers of the ulnar lateral bands were isolated and then a triangular portion of the oblique bands were excised using a Carroll blade.  The Jyoti test was then performed with improved intrinsic tightness however there was still some slight intrinsic tightness.  Because of this the oblique lateral band excision was then repeated on the radial aspect, although to a much smaller degree.  After this I was happy with the improvement in intrinsic tightness of the index finger.      Attention was then turned towards the middle finger.The extensor mechanism of the finger was identified.  The oblique fibers of the ulnar lateral bands were isolated and then a triangular portion of the oblique bands were excised using a Carroll blade.  The jyoti test was then performed with improved intrinsic tightness however there was still some slight intrinsic tightness.  Because of this the oblique lateral band excision was then repeated on the radial aspect, although to a much smaller degree.  After this I was happy with the improvement in intrinsic tightness of the finger.    Attention was then turned towards the ring finger.The extensor mechanism of the finger was identified.  The oblique fibers of the ulnar lateral bands were isolated and then a triangular portion of the oblique bands were excised using a Carroll blade.  The jyoti test was then performed with improved intrinsic tightness however there was still some slight intrinsic tightness.  Because of this the oblique lateral band excision was then repeated on the radial aspect, although to a much smaller degree.   After this I was happy with the improvement in intrinsic tightness of the finger.    Tourniquet down hemostasis achieved with bipolar electrocautery.  Skin was then closed with 4-0 nylon in horizontal mattress fashion.     At the end of the procedure the patient was awoken from anesthesia and transferred to the PACU in stable condition.  I participated in all parts of the case.      POSTOPERATIVE PLAN:  Hand therapy to begin in 3 to 5 days for early active range of motion of the right hand.  .  Tylenol ibuprofen and oxycodone for pain.  Dressing down in 3 days to hand therapy.  Follow-up in 2 weeks for suture removal.

## 2025-04-23 ENCOUNTER — OFFICE VISIT (OUTPATIENT)
Dept: ORTHOPEDIC SURGERY | Facility: CLINIC | Age: 75
End: 2025-04-23
Payer: COMMERCIAL

## 2025-04-23 VITALS — TEMPERATURE: 97.1 F

## 2025-04-23 DIAGNOSIS — Z98.890 POST-OPERATIVE STATE: Primary | ICD-10-CM

## 2025-04-23 NOTE — PROGRESS NOTES
[facility]     Follow-up Office Visit       Date: 04/23/2025   Patient Name: Reema Brothers  MRN: [id number]  YOB: 1950    Chief Complaint:   Chief Complaint   Patient presents with    Post-op     2 week S/P Right index middle and ring finger release (DOS 4/11/25)       History of Present Illness:   Reema Brothers is a 75 y.o. female status post right index middle and ring finger distal intrinsic release.  She been doing well since surgery.  Reports that she is now able to make a composite fist.  She has been working with therapy.  Reports pain is a 0 out of 10.      Subjective   Review of Systems:   Review of Systems   Constitutional:  Negative for chills, fever, unexpected weight gain and unexpected weight loss.   HENT:  Negative for congestion, postnasal drip and rhinorrhea.    Eyes:  Negative for blurred vision.   Respiratory:  Negative for shortness of breath.    Cardiovascular:  Negative for leg swelling.   Gastrointestinal:  Negative for abdominal pain, nausea and vomiting.   Genitourinary:  Negative for difficulty urinating.   Musculoskeletal:  Positive for arthralgias. Negative for gait problem, joint swelling and myalgias.   Skin:  Negative for skin lesions and wound.   Neurological:  Negative for dizziness, weakness, light-headedness and numbness.   Hematological:  Does not bruise/bleed easily.   Psychiatric/Behavioral:  Negative for depressed mood.         Pertinent review of systems per HPI    I reviewed the patient's chief complaint, history of present illness, review of systems, past medical history, surgical history, family history, social history, medications and allergy list in the EMR on 04/23/2025 and agree with the findings above.    Objective    Vital Signs:   Vitals:    04/23/25 1040   Temp: 97.1 °F (36.2 °C)     BMI: There is no height or weight on file to calculate BMI.     General  Appearance: No acute distress. Alert and oriented.     Chest:  Non-labored breathing on room air      Ortho Exam:  Right index middle and ring finger incisions well-healed.  Patient is able to bring her fingertips to her palm with active flexion.  She has persistent stiffness at the PIP and MCP joints.  Patient reports she is able to make composite fist when she warms her hand up with therapy.  Fingers warm and well-perfused distally  Sensation intact to light touch in the median, radial and ulnar nerve distributions    Imaging/Studies:   Imaging Results (Last 24 Hours)       ** No results found for the last 24 hours. **                Procedures:  Procedures    Quality Measures:   ACP:   ACP discussion was deferred.    Tobacco:   Reema Brothers  reports that she quit smoking about 5 years ago. Her smoking use included electronic cigarette and cigarettes. She started smoking about 58 years ago. She has a 53 pack-year smoking history. She has been exposed to tobacco smoke. She has never used smokeless tobacco.    Assessment / Plan    Assessment/Plan:      Diagnosis Plan   1. Post-operative state            Patient presents 2 weeks status post right index middle and ring finger distal intrinsic release.  She has an excellent postoperative result so far no significant improvement in her right hand use.  She is doing well with therapy.  Recommend continue hand therapy.  Recommend suture removal today.  Recommend follow-up in 3 months.    Follow Up:   Return in about 3 months (around 7/23/2025).        Raj Shoemaker MD  [facility] [place] Hand and Upper Extremity Surgeon

## 2025-04-23 NOTE — PROGRESS NOTES
Acute Office Visit      Date: 2025   Patient Name: Reema Brothers  : 1950   MRN: [id number]     Chief Complaint:    Chief Complaint   Patient presents with    Headache    Jaw Pain    Earache     Pt states symptoms present x1 day.     History of Present Illness  The patient is a 75-year-old female who presents for an acute visit.    She reports experiencing a headache, facial pain, gum discomfort, and earache, which have been persistent since the previous day. She expresses concern about these symptoms as she is scheduled for hand surgery on Friday. She also reports mild nasal discharge, which was green in color initially but has since become clear. She does not experience any chest pain or shortness of breath. However, she does report mild nausea. She has a history of similar symptoms occurring annually with the onset of spring. She has a known allergy to CODEINE, which causes respiratory distress, but tolerates Augmentin well. She also has a history of developing yeast infections following antibiotic therapy. She has applied eardrops, which provided some relief.    ALLERGIES  The patient is allergic to CODEINE.    Subjective      Review of Systems:   Review of Systems   Constitutional:  Negative for chills and fever.   HENT:  Positive for congestion and sinus pain. Negative for sneezing and sore throat.    Respiratory:  Negative for cough and shortness of breath.    Cardiovascular:  Negative for chest pain.   Gastrointestinal:  Negative for nausea.   Genitourinary:  Negative for dysuria.   Skin:  Negative for rash.   Neurological:  Positive for headaches.   Psychiatric/Behavioral:  The patient is not nervous/anxious.         I have reviewed the patients family history, social history, past medical history, past surgical history and have updated it as appropriate.     Medications:     Current Outpatient Medications:     Blood Glucose Monitoring Suppl ([facility]-CARE GLUCOMETER) w/Device kit, 1 unit  marking on U-100 syringe 3 (Three) Times a Day As Needed (as needed)., Disp: 1 kit, Rfl: 0    Cholecalciferol (VITAMIN D3) 400 UNITS capsule, Take 1 tablet by mouth daily., Disp: , Rfl:     clotrimazole-betamethasone (LOTRISONE) 1-0.05 % cream, APPLY CREAM TOPICALLY TO AFFECTED AND SURROUNDING AREAS TWICE DAILY IN THE MORNING AND IN THE EVENING FOR 2 WEEKS, Disp: , Rfl:     cyanocobalamin (VITAMIN B-12N) 50 MCG tablet, Take 1 tablet by mouth Daily., Disp: , Rfl:     famotidine (PEPCID) 40 MG tablet, Take 1 tablet every day by oral route at bedtime for 90 days., Disp: , Rfl:     gabapentin (NEURONTIN) 100 MG capsule, Take 1 capsule by mouth 3 (Three) Times a Day., Disp: , Rfl:     glimepiride (Amaryl) 4 MG tablet, Take 1 tablet by mouth 2 (Two) Times a Day., Disp: 180 tablet, Rfl: 3    glucose blood test strip, Use as instructed, Disp: 100 each, Rfl: 12    Lancets misc, Use as directed, Disp: 100 each, Rfl: 12    levothyroxine (SYNTHROID, LEVOTHROID) 88 MCG tablet, Take 1 tablet by mouth Daily., Disp: , Rfl:     lisinopril (PRINIVIL,ZESTRIL) 5 MG tablet, Take 1 tablet by mouth Daily., Disp: 90 tablet, Rfl: 3    lovastatin (MEVACOR) 40 MG tablet, Take 1 tablet by mouth Daily., Disp: 90 tablet, Rfl: 3    metFORMIN (GLUCOPHAGE) 1000 MG tablet, Take 1 tablet by mouth twice daily, Disp: 180 tablet, Rfl: 2    triamcinolone (KENALOG) 0.1 % cream, APPLY A THIN LAYER TO THE AFFECTED AREA(S) BY TOPICAL ROUTE 2 TIMES PER DAY, Disp: , Rfl:     amoxicillin-clavulanate (AUGMENTIN) 875-125 MG per tablet, Take 1 tablet by mouth 2 (Two) Times a Day., Disp: 14 tablet, Rfl: 0    fluconazole (Diflucan) 100 MG tablet, Take 1 tablet by mouth Every 72 (Seventy-Two) Hours. 1 tablet now followed by 1 tablet in 72 hours if symptoms remain, Disp: 2 tablet, Rfl: 0    oxyCODONE (ROXICODONE) 5 MG immediate release tablet, Take 1 tablet by mouth Every 6 (Six) Hours As Needed for Severe Pain., Disp: 12 tablet, Rfl: 0    Allergies:   Allergies  "  Allergen Reactions    Codeine Anaphylaxis    Nubain [Nalbuphine] Other (See Comments)     Excessive sedation       Objective     Physical Exam: Please see above  Vital Signs:   Vitals:    04/09/25 0837   BP: 128/72   Pulse: 84   Temp: 98 °F (36.7 °C)   TempSrc: Temporal   SpO2: 97%   Weight: 80.1 kg (176 lb 9.6 oz)   Height: 161.3 cm (63.5\")   PainSc: 10-Worst pain ever   PainLoc: Head     Body mass index is 30.79 kg/m².          Physical Exam  Vitals and nursing note reviewed.   Constitutional:       Appearance: Normal appearance.   HENT:      Head: Atraumatic.   Eyes:      Pupils: Pupils are equal, round, and reactive to light.   Cardiovascular:      Rate and Rhythm: Normal rate and regular rhythm.      Heart sounds: No murmur heard.  Pulmonary:      Effort: Pulmonary effort is normal.      Breath sounds: Normal breath sounds.   Musculoskeletal:         General: Normal range of motion.      Cervical back: Normal range of motion.      Right lower leg: No edema.      Left lower leg: No edema.   Skin:     General: Skin is warm and dry.   Neurological:      General: No focal deficit present.      Mental Status: She is alert and oriented to person, place, and time.      Gait: Gait is intact.   Psychiatric:         Mood and Affect: Mood normal.         Behavior: Behavior normal.       Physical Exam  There is some wax in the right ear, obstructing the view of the eardrum. The left ear shows signs of a mild infection. There is some drainage down the back of the throat, but otherwise it appears normal.  Tenderness noted of frontal sinuses.    Procedures    Results:   Labs:   Hemoglobin A1C   Date Value Ref Range Status   03/18/2025 7.3 (A) 4.5 - 5.7 % Final   08/27/2024 6.80 (H) 4.80 - 5.60 % Final     TSH   Date Value Ref Range Status   11/15/2021 1.030 0.270 - 4.200 uIU/mL Final   07/30/2021 8.510 (H) 0.270 - 4.200 uIU/mL Final        POCT Results (if applicable):   Results for orders placed or performed in visit on " 03/18/25   POC Glycosylated Hemoglobin (Hb A1C)    Collection Time: 03/18/25  9:37 AM    Specimen: Blood   Result Value Ref Range    Hemoglobin A1C 7.3 (A) 4.5 - 5.7 %    Lot Number [id number]     Expiration Date 11/06/2026    POC Albumin/Creatinine Ratio Urine    Collection Time: 03/18/25  9:38 AM    Specimen: Urine   Result Value Ref Range    POC ALBUMIN, URINE 10 mg/L    POC CREATININE, URINE 50 mg/dL    POC Urine Albumin Creatinine Ratio  <30    Lot Number [id number]     Expiration Date 11/30/2025        Imaging:   No valid procedures specified.     Measures:   Smoking Cessation:   Former smoker      Assessment / Plan      Assessment/Plan:   Diagnoses and all orders for this visit:    1. Acute bacterial rhinosinusitis (Primary)  -     amoxicillin-clavulanate (AUGMENTIN) 875-125 MG per tablet; Take 1 tablet by mouth 2 (Two) Times a Day.  Dispense: 14 tablet; Refill: 0  -     fluconazole (Diflucan) 100 MG tablet; Take 1 tablet by mouth Every 72 (Seventy-Two) Hours. 1 tablet now followed by 1 tablet in 72 hours if symptoms remain  Dispense: 2 tablet; Refill: 0    2. Non-recurrent acute suppurative otitis media of left ear without spontaneous rupture of tympanic membrane  -     amoxicillin-clavulanate (AUGMENTIN) 875-125 MG per tablet; Take 1 tablet by mouth 2 (Two) Times a Day.  Dispense: 14 tablet; Refill: 0  -     fluconazole (Diflucan) 100 MG tablet; Take 1 tablet by mouth Every 72 (Seventy-Two) Hours. 1 tablet now followed by 1 tablet in 72 hours if symptoms remain  Dispense: 2 tablet; Refill: 0      Assessment & Plan  1. Sinusitis  - Symptoms: headache, cheekbone pain, gum pain, ear pain, green mucus initially, clear mucus currently  - Examination: sinus infection and left ear infection  - Prescribe Augmentin for sinus and ear infections  - Continue cholesterol medication during treatment    2. Ear Infection  - Left ear shows signs of infection  - Prescribe Augmentin for ear infection along with sinus  infection    3. Yeast Infection Prophylaxis  - Typically gets yeast infections with antibiotics  - Prescribe Diflucan as prophylactic measure  - Instructions: take first Diflucan tablet immediately, second tablet at conclusion of antibiotic course  - If symptoms of yeast infection appear: take one tablet  - If symptoms persist after 72 hours: take second tablet      Follow Up:   Return if symptoms worsen or fail to improve..    Patient or patient representative verbalized consent for the use of Ambient Listening during the visit with  Kirsten Crane MD for chart documentation. 4/23/2025  08:31 EDT      Kirsten Crane MD  [facility]

## 2025-05-07 ENCOUNTER — HOSPITAL ENCOUNTER (INPATIENT)
Facility: HOSPITAL | Age: 75
LOS: 1 days | Discharge: ANOTHER HOSPITAL | End: 2025-05-08
Attending: STUDENT IN AN ORGANIZED HEALTH CARE EDUCATION/TRAINING PROGRAM | Admitting: INTERNAL MEDICINE
Payer: COMMERCIAL

## 2025-05-07 ENCOUNTER — APPOINTMENT (OUTPATIENT)
Dept: GENERAL RADIOLOGY | Facility: HOSPITAL | Age: 75
End: 2025-05-07
Payer: COMMERCIAL

## 2025-05-07 ENCOUNTER — APPOINTMENT (OUTPATIENT)
Dept: CT IMAGING | Facility: HOSPITAL | Age: 75
End: 2025-05-07
Payer: COMMERCIAL

## 2025-05-07 DIAGNOSIS — B34.9 VIRAL ILLNESS: Primary | ICD-10-CM

## 2025-05-07 DIAGNOSIS — I21.4 NSTEMI (NON-ST ELEVATED MYOCARDIAL INFARCTION): ICD-10-CM

## 2025-05-07 LAB
ALBUMIN SERPL-MCNC: 4.4 G/DL (ref 3.5–5.2)
ALBUMIN/GLOB SERPL: 1.6 G/DL
ALP SERPL-CCNC: 54 U/L (ref 39–117)
ALT SERPL W P-5'-P-CCNC: 24 U/L (ref 1–33)
ANION GAP SERPL CALCULATED.3IONS-SCNC: 15 MMOL/L (ref 5–15)
AST SERPL-CCNC: 28 U/L (ref 1–32)
BASOPHILS # BLD AUTO: 0.05 10*3/MM3 (ref 0–0.2)
BASOPHILS NFR BLD AUTO: 0.5 % (ref 0–1.5)
BILIRUB SERPL-MCNC: 0.4 MG/DL (ref 0–1.2)
BUN SERPL-MCNC: 11 MG/DL (ref 8–23)
BUN/CREAT SERPL: 13.3 (ref 7–25)
CALCIUM SPEC-SCNC: 10.1 MG/DL (ref 8.6–10.5)
CHLORIDE SERPL-SCNC: 104 MMOL/L (ref 98–107)
CO2 SERPL-SCNC: 24 MMOL/L (ref 22–29)
CREAT SERPL-MCNC: 0.83 MG/DL (ref 0.57–1)
CRP SERPL-MCNC: <0.3 MG/DL (ref 0–0.5)
D DIMER PPP FEU-MCNC: 0.45 MCGFEU/ML (ref 0–0.74)
DEPRECATED RDW RBC AUTO: 44.7 FL (ref 37–54)
EGFRCR SERPLBLD CKD-EPI 2021: 74.1 ML/MIN/1.73
EOSINOPHIL # BLD AUTO: 0.05 10*3/MM3 (ref 0–0.4)
EOSINOPHIL NFR BLD AUTO: 0.5 % (ref 0.3–6.2)
ERYTHROCYTE [DISTWIDTH] IN BLOOD BY AUTOMATED COUNT: 12.6 % (ref 12.3–15.4)
FLUAV SUBTYP SPEC NAA+PROBE: NOT DETECTED
FLUBV RNA ISLT QL NAA+PROBE: NOT DETECTED
GEN 5 1HR TROPONIN T REFLEX: 49 NG/L
GLOBULIN UR ELPH-MCNC: 2.8 GM/DL
GLUCOSE SERPL-MCNC: 121 MG/DL (ref 65–99)
HCT VFR BLD AUTO: 38.5 % (ref 34–46.6)
HGB BLD-MCNC: 12.8 G/DL (ref 12–15.9)
IMM GRANULOCYTES # BLD AUTO: 0.04 10*3/MM3 (ref 0–0.05)
IMM GRANULOCYTES NFR BLD AUTO: 0.4 % (ref 0–0.5)
LYMPHOCYTES # BLD AUTO: 1.98 10*3/MM3 (ref 0.7–3.1)
LYMPHOCYTES NFR BLD AUTO: 18.8 % (ref 19.6–45.3)
MAGNESIUM SERPL-MCNC: 1.6 MG/DL (ref 1.6–2.4)
MCH RBC QN AUTO: 31.7 PG (ref 26.6–33)
MCHC RBC AUTO-ENTMCNC: 33.2 G/DL (ref 31.5–35.7)
MCV RBC AUTO: 95.3 FL (ref 79–97)
MONOCYTES # BLD AUTO: 0.6 10*3/MM3 (ref 0.1–0.9)
MONOCYTES NFR BLD AUTO: 5.7 % (ref 5–12)
NEUTROPHILS NFR BLD AUTO: 7.83 10*3/MM3 (ref 1.7–7)
NEUTROPHILS NFR BLD AUTO: 74.1 % (ref 42.7–76)
NRBC BLD AUTO-RTO: 0 /100 WBC (ref 0–0.2)
PLATELET # BLD AUTO: 230 10*3/MM3 (ref 140–450)
PMV BLD AUTO: 10.2 FL (ref 6–12)
POTASSIUM SERPL-SCNC: 4.1 MMOL/L (ref 3.5–5.2)
PROCALCITONIN SERPL-MCNC: 0.04 NG/ML (ref 0–0.25)
PROT SERPL-MCNC: 7.2 G/DL (ref 6–8.5)
RBC # BLD AUTO: 4.04 10*6/MM3 (ref 3.77–5.28)
SARS-COV-2 RNA RESP QL NAA+PROBE: NOT DETECTED
SODIUM SERPL-SCNC: 143 MMOL/L (ref 136–145)
TROPONIN T % DELTA: 63
TROPONIN T NUMERIC DELTA: 19 NG/L
TROPONIN T SERPL HS-MCNC: 30 NG/L
WBC NRBC COR # BLD AUTO: 10.55 10*3/MM3 (ref 3.4–10.8)

## 2025-05-07 PROCEDURE — 99222 1ST HOSP IP/OBS MODERATE 55: CPT | Performed by: INTERNAL MEDICINE

## 2025-05-07 PROCEDURE — 85025 COMPLETE CBC W/AUTO DIFF WBC: CPT | Performed by: STUDENT IN AN ORGANIZED HEALTH CARE EDUCATION/TRAINING PROGRAM

## 2025-05-07 PROCEDURE — 93005 ELECTROCARDIOGRAM TRACING: CPT | Performed by: STUDENT IN AN ORGANIZED HEALTH CARE EDUCATION/TRAINING PROGRAM

## 2025-05-07 PROCEDURE — 84145 PROCALCITONIN (PCT): CPT | Performed by: STUDENT IN AN ORGANIZED HEALTH CARE EDUCATION/TRAINING PROGRAM

## 2025-05-07 PROCEDURE — 99291 CRITICAL CARE FIRST HOUR: CPT | Performed by: STUDENT IN AN ORGANIZED HEALTH CARE EDUCATION/TRAINING PROGRAM

## 2025-05-07 PROCEDURE — 71046 X-RAY EXAM CHEST 2 VIEWS: CPT

## 2025-05-07 PROCEDURE — 84484 ASSAY OF TROPONIN QUANT: CPT | Performed by: STUDENT IN AN ORGANIZED HEALTH CARE EDUCATION/TRAINING PROGRAM

## 2025-05-07 PROCEDURE — 86140 C-REACTIVE PROTEIN: CPT | Performed by: STUDENT IN AN ORGANIZED HEALTH CARE EDUCATION/TRAINING PROGRAM

## 2025-05-07 PROCEDURE — 70450 CT HEAD/BRAIN W/O DYE: CPT

## 2025-05-07 PROCEDURE — 85379 FIBRIN DEGRADATION QUANT: CPT | Performed by: STUDENT IN AN ORGANIZED HEALTH CARE EDUCATION/TRAINING PROGRAM

## 2025-05-07 PROCEDURE — 80053 COMPREHEN METABOLIC PANEL: CPT | Performed by: STUDENT IN AN ORGANIZED HEALTH CARE EDUCATION/TRAINING PROGRAM

## 2025-05-07 PROCEDURE — 25010000002 ENOXAPARIN PER 10 MG: Performed by: STUDENT IN AN ORGANIZED HEALTH CARE EDUCATION/TRAINING PROGRAM

## 2025-05-07 PROCEDURE — 99285 EMERGENCY DEPT VISIT HI MDM: CPT

## 2025-05-07 PROCEDURE — 83735 ASSAY OF MAGNESIUM: CPT | Performed by: STUDENT IN AN ORGANIZED HEALTH CARE EDUCATION/TRAINING PROGRAM

## 2025-05-07 PROCEDURE — 87636 SARSCOV2 & INF A&B AMP PRB: CPT | Performed by: STUDENT IN AN ORGANIZED HEALTH CARE EDUCATION/TRAINING PROGRAM

## 2025-05-07 RX ORDER — NITROGLYCERIN 0.4 MG/1
0.4 TABLET SUBLINGUAL
Status: DISCONTINUED | OUTPATIENT
Start: 2025-05-07 | End: 2025-05-08 | Stop reason: HOSPADM

## 2025-05-07 RX ORDER — ACETAMINOPHEN 325 MG/1
650 TABLET ORAL EVERY 4 HOURS PRN
Status: DISCONTINUED | OUTPATIENT
Start: 2025-05-07 | End: 2025-05-08 | Stop reason: HOSPADM

## 2025-05-07 RX ORDER — CETIRIZINE HYDROCHLORIDE 10 MG/1
10 TABLET ORAL ONCE
Status: COMPLETED | OUTPATIENT
Start: 2025-05-07 | End: 2025-05-07

## 2025-05-07 RX ORDER — ENOXAPARIN SODIUM 100 MG/ML
1 INJECTION SUBCUTANEOUS EVERY 12 HOURS
Status: DISCONTINUED | OUTPATIENT
Start: 2025-05-08 | End: 2025-05-08 | Stop reason: HOSPADM

## 2025-05-07 RX ORDER — BUTALBITAL, ACETAMINOPHEN AND CAFFEINE 50; 325; 40 MG/1; MG/1; MG/1
1 TABLET ORAL ONCE
Status: COMPLETED | OUTPATIENT
Start: 2025-05-07 | End: 2025-05-07

## 2025-05-07 RX ORDER — ONDANSETRON 2 MG/ML
4 INJECTION INTRAMUSCULAR; INTRAVENOUS EVERY 6 HOURS PRN
Status: DISCONTINUED | OUTPATIENT
Start: 2025-05-07 | End: 2025-05-08 | Stop reason: HOSPADM

## 2025-05-07 RX ORDER — BISACODYL 10 MG
10 SUPPOSITORY, RECTAL RECTAL DAILY PRN
Status: DISCONTINUED | OUTPATIENT
Start: 2025-05-07 | End: 2025-05-08 | Stop reason: HOSPADM

## 2025-05-07 RX ORDER — LISINOPRIL 5 MG/1
5 TABLET ORAL DAILY
Status: DISCONTINUED | OUTPATIENT
Start: 2025-05-08 | End: 2025-05-08 | Stop reason: HOSPADM

## 2025-05-07 RX ORDER — SODIUM CHLORIDE 0.9 % (FLUSH) 0.9 %
10 SYRINGE (ML) INJECTION EVERY 12 HOURS SCHEDULED
Status: DISCONTINUED | OUTPATIENT
Start: 2025-05-07 | End: 2025-05-08 | Stop reason: HOSPADM

## 2025-05-07 RX ORDER — ATORVASTATIN CALCIUM 80 MG/1
80 TABLET, FILM COATED ORAL NIGHTLY
Status: DISCONTINUED | OUTPATIENT
Start: 2025-05-07 | End: 2025-05-08 | Stop reason: HOSPADM

## 2025-05-07 RX ORDER — LEVOTHYROXINE SODIUM 88 UG/1
88 TABLET ORAL DAILY
Status: DISCONTINUED | OUTPATIENT
Start: 2025-05-08 | End: 2025-05-08 | Stop reason: HOSPADM

## 2025-05-07 RX ORDER — ACETAMINOPHEN 160 MG/5ML
650 SOLUTION ORAL EVERY 4 HOURS PRN
Status: DISCONTINUED | OUTPATIENT
Start: 2025-05-07 | End: 2025-05-08 | Stop reason: HOSPADM

## 2025-05-07 RX ORDER — AMOXICILLIN 250 MG
2 CAPSULE ORAL 2 TIMES DAILY PRN
Status: DISCONTINUED | OUTPATIENT
Start: 2025-05-07 | End: 2025-05-08 | Stop reason: HOSPADM

## 2025-05-07 RX ORDER — ACETAMINOPHEN 650 MG/1
650 SUPPOSITORY RECTAL EVERY 4 HOURS PRN
Status: DISCONTINUED | OUTPATIENT
Start: 2025-05-07 | End: 2025-05-08 | Stop reason: HOSPADM

## 2025-05-07 RX ORDER — SODIUM CHLORIDE 9 MG/ML
40 INJECTION, SOLUTION INTRAVENOUS AS NEEDED
Status: DISCONTINUED | OUTPATIENT
Start: 2025-05-07 | End: 2025-05-08 | Stop reason: HOSPADM

## 2025-05-07 RX ORDER — POLYETHYLENE GLYCOL 3350 17 G/17G
17 POWDER, FOR SOLUTION ORAL DAILY PRN
Status: DISCONTINUED | OUTPATIENT
Start: 2025-05-07 | End: 2025-05-08 | Stop reason: HOSPADM

## 2025-05-07 RX ORDER — SODIUM CHLORIDE 0.9 % (FLUSH) 0.9 %
10 SYRINGE (ML) INJECTION AS NEEDED
Status: DISCONTINUED | OUTPATIENT
Start: 2025-05-07 | End: 2025-05-08 | Stop reason: HOSPADM

## 2025-05-07 RX ORDER — FLUTICASONE PROPIONATE 50 MCG
2 SPRAY, SUSPENSION (ML) NASAL DAILY
Qty: 11.1 G | Refills: 0 | Status: SHIPPED | OUTPATIENT
Start: 2025-05-07

## 2025-05-07 RX ORDER — OXYMETAZOLINE HYDROCHLORIDE 0.05 G/100ML
2 SPRAY NASAL 2 TIMES DAILY
Status: DISCONTINUED | OUTPATIENT
Start: 2025-05-07 | End: 2025-05-08 | Stop reason: HOSPADM

## 2025-05-07 RX ORDER — ENOXAPARIN SODIUM 100 MG/ML
1 INJECTION SUBCUTANEOUS ONCE
Status: COMPLETED | OUTPATIENT
Start: 2025-05-07 | End: 2025-05-07

## 2025-05-07 RX ORDER — CETIRIZINE HYDROCHLORIDE 10 MG/1
10 TABLET ORAL DAILY
Qty: 30 TABLET | Refills: 0 | Status: SHIPPED | OUTPATIENT
Start: 2025-05-07 | End: 2025-05-16

## 2025-05-07 RX ORDER — BISACODYL 5 MG/1
5 TABLET, DELAYED RELEASE ORAL DAILY PRN
Status: DISCONTINUED | OUTPATIENT
Start: 2025-05-07 | End: 2025-05-08 | Stop reason: HOSPADM

## 2025-05-07 RX ADMIN — ATORVASTATIN CALCIUM 80 MG: 80 TABLET, FILM COATED ORAL at 20:09

## 2025-05-07 RX ADMIN — BUTALBITAL, ACETAMINOPHEN, AND CAFFEINE 1 TABLET: 325; 50; 40 TABLET ORAL at 14:10

## 2025-05-07 RX ADMIN — ENOXAPARIN SODIUM 80 MG: 100 INJECTION SUBCUTANEOUS at 17:47

## 2025-05-07 RX ADMIN — NITROGLYCERIN 0.4 MG: 0.4 TABLET SUBLINGUAL at 17:47

## 2025-05-07 RX ADMIN — Medication 5 MG: at 20:09

## 2025-05-07 RX ADMIN — Medication 10 ML: at 20:09

## 2025-05-07 RX ADMIN — Medication 2 SPRAY: at 20:08

## 2025-05-07 RX ADMIN — CETIRIZINE HYDROCHLORIDE 10 MG: 10 TABLET, FILM COATED ORAL at 14:10

## 2025-05-07 NOTE — H&P
"  [facility]      Name:  Reema Brothers   Age:  75 y.o.  Sex:  female  :  1950  MRN:  [id number]   Visit Number:  [id number]  Admission Date:  2025  Date Of Service:  25  Primary Care Physician:  Ronaldo Gill DO    Chief Complaint:     Chest pain    History Of Presenting Illness:      Patient is a pleasant 75-year-old female with history significant for hypertension hyperlipidemia who presents to the emergency room with complaints of right ear pain.  Patient reports that she has been having ear pain for the last week.  She saw primary physician who recently prescribed antibiotic for left ear infection.  Patient reports that left ear has improved but right ear was so painful today she cried prompting  and son to bring her to the emergency room.  While being evaluated in the emergency room for ear pain, workup unremarkable and patient was plan for discharge home.  However, when ER physician went to reevaluate patient to tell her of disposition she began complaining of left arm pain/numbness with radiation into the neck.  Denied specific chest pain but stated \"she just did not feel right\".  Cardiac workup initiated.  Troponins elevated and uptrending.  As such, patient received therapeutic Lovenox dosing and hospitalist asked to admit.  At the time of my exam patient resting comfortably and states that both left arm pain and right ear pain are improving.    Review Of Systems:    All systems were reviewed and negative except as mentioned in history of presenting illness, assessment and plan.    Past Medical History: Patient  has a past medical history of Allergic rhinitis, Arthritis, Diabetes mellitus, Disease of thyroid gland, GERD (gastroesophageal reflux disease), Hyperlipidemia, Hypertension, NSTEMI (non-ST elevated myocardial infarction) (2025), and Rheumatic fever.    Past Surgical History: Patient  has a past surgical history " that includes Breast surgery; Cholecystectomy; Tubal ligation; Knee surgery; Shoulder surgery; Wrist surgery; Breast biopsy (Left); and Hand surgery (Right, 09/06/2024).    Social History: Patient  reports that she quit smoking about 5 years ago. Her smoking use included electronic cigarette and cigarettes. She started smoking about 58 years ago. She has a 53 pack-year smoking history. She has been exposed to tobacco smoke. She has never used smokeless tobacco. She reports that she does not drink alcohol and does not use drugs.    Family History:  Patient's family history has been reviewed and found to be noncontributory.     Allergies:      Codeine and Nubain [nalbuphine]    Home Medications:    Prior to Admission Medications       Prescriptions Last Dose Informant Patient Reported? Taking?    amoxicillin-clavulanate (AUGMENTIN) 875-125 MG per tablet   No No    Take 1 tablet by mouth 2 (Two) Times a Day.    Blood Glucose Monitoring Suppl ([facility]-CARE GLUCOMETER) w/Device kit   No No    1 unit marking on U-100 syringe 3 (Three) Times a Day As Needed (as needed).    Cholecalciferol (VITAMIN D3) 400 UNITS capsule   Yes No    Take 1 tablet by mouth daily.    clotrimazole-betamethasone (LOTRISONE) 1-0.05 % cream   Yes No    APPLY CREAM TOPICALLY TO AFFECTED AND SURROUNDING AREAS TWICE DAILY IN THE MORNING AND IN THE EVENING FOR 2 WEEKS    cyanocobalamin (VITAMIN B-12N) 50 MCG tablet   Yes No    Take 1 tablet by mouth Daily.    famotidine (PEPCID) 40 MG tablet   Yes No    Take 1 tablet every day by oral route at bedtime for 90 days.    fluconazole (Diflucan) 100 MG tablet   No No    Take 1 tablet by mouth Every 72 (Seventy-Two) Hours. 1 tablet now followed by 1 tablet in 72 hours if symptoms remain    gabapentin (NEURONTIN) 100 MG capsule   Yes No    Take 1 capsule by mouth 3 (Three) Times a Day.    glimepiride (Amaryl) 4 MG tablet   No No    Take 1 tablet by mouth 2 (Two) Times a Day.    glucose blood test strip   No No     "Use as instructed    Lancets misc   No No    Use as directed    levothyroxine (SYNTHROID, LEVOTHROID) 88 MCG tablet   Yes No    Take 1 tablet by mouth Daily.    lisinopril (PRINIVIL,ZESTRIL) 5 MG tablet   No No    Take 1 tablet by mouth Daily.    lovastatin (MEVACOR) 40 MG tablet   No No    Take 1 tablet by mouth Daily.    metFORMIN (GLUCOPHAGE) 1000 MG tablet   No No    Take 1 tablet by mouth twice daily    oxyCODONE (ROXICODONE) 5 MG immediate release tablet   No No    Take 1 tablet by mouth Every 6 (Six) Hours As Needed for Severe Pain.    triamcinolone (KENALOG) 0.1 % cream   Yes No    APPLY A THIN LAYER TO THE AFFECTED AREA(S) BY TOPICAL ROUTE 2 TIMES PER DAY          ED Medications:    Medications   enoxaparin sodium (LOVENOX) syringe 80 mg (has no administration in time range)   nitroglycerin (NITROSTAT) SL tablet 0.4 mg (has no administration in time range)   butalbital-acetaminophen-caffeine (FIORICET, ESGIC) -40 MG per tablet 1 tablet (1 tablet Oral Given 5/7/25 1410)   cetirizine (zyrTEC) tablet 10 mg (10 mg Oral Given 5/7/25 1410)     Vital Signs:  Temp:  [98 °F (36.7 °C)] 98 °F (36.7 °C)  Heart Rate:  [] 80  Resp:  [19] 19  BP: (142-176)/(68-88) 143/68        05/07/25  1335   Weight: 80 kg (176 lb 5.9 oz)     Body mass index is 30.86 kg/m².    Physical Exam:     Most recent vital Signs: /68   Pulse 80   Temp 98 °F (36.7 °C) (Oral)   Resp 19   Ht 161 cm (63.39\")   Wt 80 kg (176 lb 5.9 oz)   SpO2 93%   BMI 30.86 kg/m²     Physical Exam  Vitals reviewed.   Constitutional:       General: She is not in acute distress.  HENT:      Head: Normocephalic and atraumatic.      Right Ear: External ear normal.      Left Ear: External ear normal.      Mouth/Throat:      Mouth: Mucous membranes are moist.      Pharynx: Oropharynx is clear.   Eyes:      Extraocular Movements: Extraocular movements intact.      Conjunctiva/sclera: Conjunctivae normal.   Cardiovascular:      Rate and Rhythm: " "Normal rate and regular rhythm.      Pulses: Normal pulses.      Heart sounds: Normal heart sounds.   Pulmonary:      Effort: Pulmonary effort is normal.      Breath sounds: Normal breath sounds.   Abdominal:      General: Bowel sounds are normal.      Palpations: Abdomen is soft.   Musculoskeletal:         General: Normal range of motion.   Skin:     General: Skin is warm and dry.   Neurological:      General: No focal deficit present.      Mental Status: She is alert and oriented to person, place, and time.         Laboratory data:    I have reviewed the labs done in the emergency room.    Results from last 7 days   Lab Units 05/07/25  1521   SODIUM mmol/L 143   POTASSIUM mmol/L 4.1   CHLORIDE mmol/L 104   CO2 mmol/L 24.0   BUN mg/dL 11   CREATININE mg/dL 0.83   CALCIUM mg/dL 10.1   BILIRUBIN mg/dL 0.4   ALK PHOS U/L 54   ALT (SGPT) U/L 24   AST (SGOT) U/L 28   GLUCOSE mg/dL 121*     Results from last 7 days   Lab Units 05/07/25  1521   WBC 10*3/mm3 10.55   HEMOGLOBIN g/dL 12.8   HEMATOCRIT % 38.5   PLATELETS 10*3/mm3 230         Results from last 7 days   Lab Units 05/07/25  1634 05/07/25  1521   HSTROP T ng/L 49* 30*                           Invalid input(s): \"USDES\", \"NITRITITE\", \"BACT\", \"EP\"    Pain Management Panel           No data to display                EKG:      EKG personally reviewed, sinus rhythm with rate of 95 bpm.  No acute ST or T wave changes.    Radiology:    XR Chest 2 View  Result Date: 5/7/2025  PROCEDURE: XR CHEST 2 VW-  HISTORY: Chest pain; B34.9-Viral infection, unspecified  COMPARISON: None.  FINDINGS: The heart is normal in size. The lungs are clear. The mediastinum is unremarkable. There is no pneumothorax.  There are no acute osseous abnormalities. There is evidence of old calcified granulomatous disease.      No acute cardiopulmonary process.      This report was signed and finalized on 5/7/2025 4:20 PM by Leticia Allen MD.      CT Head Without Contrast  Result Date: " 5/7/2025  PROCEDURE: CT HEAD WO CONTRAST-  HISTORY: Headache, eval intracranial hemorrhage  COMPARISON: None.  TECHNIQUE: Multiple axial CT images were performed from the foramen magnum to the vertex. Individualized dose reduction techniques using automated exposure control or adjustment of mA and/or kV according to the patient size were employed.  FINDINGS: There is mild, age-appropriate generalized cerebral atrophy. The ventricles are enlarged. There is no evidence of edema or hemorrhage.  No masses are identified. No extra-axial fluid is seen. The paranasal sinuses are unremarkable.      Atrophy  without acute process.     CTDI: 34.52 mGy DLP:653.85 mGy.cm  This report was signed and finalized on 5/7/2025 2:52 PM by Leticia Allen MD.        Assessment:    NSTEMI  Hypertension  Hyperlipidemia  Hypothyroidism  Type 2 diabetes, non-insulin-dependent    Plan:    NSTEMI  Uptrending troponins, 30-49.  Will continue to trend  Consulted Dr. Avilez.  Recommend keep n.p.o.  Therapeutic Lovenox  High intensity statin  Will obtain lipid panel and A1c  Echo    Further orders as clinical course dictate    Risk Assessment: High  DVT Prophylaxis: Lovenox  Code Status: Full  Diet: NPO               Alexys Todd DO  05/07/25  17:30 EDT    Dictated utilizing Dragon dictation.

## 2025-05-07 NOTE — PHARMACY RECOMMENDATION
"Pharmacy Consult - Enoxaparin Dosing  Reema Brothers is a 75 y.o. female who has been consulted to dose enoxaparin for NSTEMI, requiring full anticoagulation.     Allergies  Codeine and Nubain [nalbuphine]    Relevant clinical data and objective history reviewed:   [Ht: 161 cm (63.39\"); Wt: 76.8 kg (169 lb 5 oz)]  Body mass index is 29.63 kg/m².  Estimated Creatinine Clearance: 58.9 mL/min (by C-G formula based on SCr of 0.83 mg/dL).  Results from last 7 days   Lab Units 05/07/25  1521   HEMOGLOBIN g/dL 12.8   HEMATOCRIT % 38.5   PLATELETS 10*3/mm3 230   CREATININE mg/dL 0.83       Asessment/Plan  Initiate enoxaparin 1 mg/kg (rounded to 80 mg) subq q 12 hrs.  Pharmacy will monitor Ms. Brothers's renal function and clinical status and adjust the enoxaparin dose and/or frequency as needed.      Thank you for the opportunity to consult on this patient.    Prakash Sahni [facility], Pharm.D.  05/07/25  18:29 EDT    "

## 2025-05-07 NOTE — ED PROVIDER NOTES
Subjective:  History of Present Illness:    Patient is a 75-year-old female with history of diabetes mellitus, hypothyroidism, GERD, hypertension, hyperlipidemia who presents today with headache.  Reports that she has severe earache to both ears radiating across her mouth and face and into her head.  She denies any fevers.  Had onset of cough and congestion today.  Denies any chest pain or shortness of breath.  No abdominal pain.  Nausea secondary to the pain per her.  Denies any vomiting or diarrhea.  No dysuria.  No new leg swelling or leg pain.      Nurses Notes reviewed and agree, including vitals, allergies, social history and prior medical history.     REVIEW OF SYSTEMS: All systems reviewed and not pertinent unless noted.  Review of Systems   Constitutional:  Positive for activity change. Negative for appetite change, chills, fatigue and fever.   HENT:  Positive for congestion, ear pain and rhinorrhea. Negative for ear discharge, sinus pressure and sinus pain.    Eyes:  Negative for discharge and itching.   Respiratory:  Negative for cough and shortness of breath.    Cardiovascular:  Negative for chest pain and leg swelling.   Gastrointestinal:  Negative for abdominal distention, abdominal pain, nausea and vomiting.   Endocrine: Negative for cold intolerance and heat intolerance.   Genitourinary:  Negative for decreased urine volume, difficulty urinating, flank pain, frequency, urgency, vaginal bleeding, vaginal discharge and vaginal pain.   Musculoskeletal:  Negative for gait problem, neck pain and neck stiffness.   Skin:  Negative for color change.   Allergic/Immunologic: Negative for environmental allergies.   Neurological:  Positive for headaches. Negative for seizures, syncope, facial asymmetry and speech difficulty.   Psychiatric/Behavioral:  Negative for self-injury and suicidal ideas.        Past Medical History:   Diagnosis Date    Allergic rhinitis     Arthritis     Diabetes mellitus     Disease of  "thyroid gland     GERD (gastroesophageal reflux disease)     Hyperlipidemia     Hypertension     NSTEMI (non-ST elevated myocardial infarction) 2025    Rheumatic fever        Allergies:    Codeine and Nubain [nalbuphine]      Past Surgical History:   Procedure Laterality Date    BREAST BIOPSY Left     x 10 yrs ago    BREAST SURGERY      CHOLECYSTECTOMY      HAND SURGERY Right 2024    Right hand carpal tunnel release, Right hand flexor tenolysis of FDS and FDP to the index, middle, ring and small fingers, Right middle finger trigger release, Right ring finger trigger finger release-- Dr. Shoemaker    KNEE SURGERY      SHOULDER SURGERY      TUBAL ABDOMINAL LIGATION      WRIST SURGERY           Social History     Socioeconomic History    Marital status:    Tobacco Use    Smoking status: Former     Current packs/day: 0.00     Average packs/day: 1 pack/day for 53.0 years (53.0 ttl pk-yrs)     Types: Electronic Cigarette, Cigarettes     Start date:      Quit date:      Years since quittin.3     Passive exposure: Past    Smokeless tobacco: Never    Tobacco comments:     Pt stated that she does vapor cigarette   Vaping Use    Vaping status: Former   Substance and Sexual Activity    Alcohol use: No    Drug use: No    Sexual activity: Defer         Family History   Problem Relation Age of Onset    Arthritis Other     Thyroid disease Other     Diabetes Other     Obesity Other     Kidney disease Other     Liver disease Other     No Known Problems Mother     No Known Problems Father     No Known Problems Sister     No Known Problems Brother     Breast cancer Maternal Aunt        Objective  Physical Exam:  /77 (BP Location: Left arm, Patient Position: Sitting)   Pulse 78   Temp 98.1 °F (36.7 °C) (Oral)   Resp 18   Ht 161 cm (63.39\")   Wt 76.8 kg (169 lb 5 oz)   SpO2 96%   BMI 29.63 kg/m²      Physical Exam  Constitutional:       General: She is not in acute distress.     Appearance: Normal " appearance. She is not ill-appearing.   HENT:      Head: Normocephalic and atraumatic.      Right Ear: Ear canal and external ear normal.      Left Ear: Ear canal and external ear normal.      Ears:      Comments: Serous otitis media to bilateral TMs with no overlying erythema     Nose: Nose normal. Congestion and rhinorrhea present.      Mouth/Throat:      Mouth: Mucous membranes are dry.      Pharynx: Oropharynx is clear. No oropharyngeal exudate or posterior oropharyngeal erythema.   Eyes:      Extraocular Movements: Extraocular movements intact.      Conjunctiva/sclera: Conjunctivae normal.      Pupils: Pupils are equal, round, and reactive to light.   Cardiovascular:      Rate and Rhythm: Normal rate and regular rhythm.      Pulses: Normal pulses.      Heart sounds: Normal heart sounds.   Pulmonary:      Effort: Pulmonary effort is normal. No respiratory distress.      Breath sounds: Normal breath sounds.   Abdominal:      General: Abdomen is flat. Bowel sounds are normal. There is no distension.      Palpations: Abdomen is soft.      Tenderness: There is no abdominal tenderness. There is no right CVA tenderness, left CVA tenderness, guarding or rebound.   Musculoskeletal:         General: No swelling or tenderness. Normal range of motion.      Cervical back: Normal range of motion and neck supple.      Right lower leg: No edema.      Left lower leg: No edema.   Skin:     General: Skin is warm and dry.      Capillary Refill: Capillary refill takes less than 2 seconds.   Neurological:      General: No focal deficit present.      Mental Status: She is alert and oriented to person, place, and time. Mental status is at baseline.      Cranial Nerves: No cranial nerve deficit.      Sensory: No sensory deficit.      Motor: No weakness.      Coordination: Coordination normal.      Gait: Gait normal.   Psychiatric:         Mood and Affect: Mood normal.         Behavior: Behavior normal.         Thought Content: Thought  content normal.         Judgment: Judgment normal.         Critical Care    Performed by: Froilan Holden MD  Authorized by: Alexys Todd DO    Critical care provider statement:     Critical care time (minutes):  30    Critical care time was exclusive of:  Separately billable procedures and treating other patients    Critical care was necessary to treat or prevent imminent or life-threatening deterioration of the following conditions:  Cardiac failure (NSTEMI)    Critical care was time spent personally by me on the following activities:  Blood draw for specimens, development of treatment plan with patient or surrogate, discussions with primary provider, evaluation of patient's response to treatment, examination of patient, obtaining history from patient or surrogate, ordering and performing treatments and interventions, ordering and review of laboratory studies, ordering and review of radiographic studies, pulse oximetry, re-evaluation of patient's condition and review of old charts    Care discussed with: admitting provider        ED Course:    ED Course as of 05/07/25 2234   Wed May 07, 2025   1428 CT head independently interpreted by me showing no concerns for intracranial hemorrhage [JE]   1506 I went to bedside to discharge the patient.  Patient now states that she is complaining of chest pain.  Now complaining of left arm pain.  Had not made any of these complaints on her initial evaluation.  Offered chest pain evaluation here which was accepted.  Now awaiting these lab work and imaging. [JE]   1523 EKG interpreted by me, normal sinus rhythm no concerning ST changes noted, rate of 95 [JE]   1623 Chest x-ray independently interpreted by me showed no acute process [JE]      ED Course User Index  [JE] Froilan Holden MD       Lab Results (last 24 hours)       Procedure Component Value Units Date/Time    COVID-19 and FLU A/B PCR, 1 HR TAT - Swab, Nasopharynx [[id number]]  (Normal) Collected: 05/07/25  1358    Specimen: Swab from Nasopharynx Updated: 05/07/25 1422     COVID19 Not Detected     Influenza A PCR Not Detected     Influenza B PCR Not Detected    Narrative:      Fact sheet for providers: https://www.fda.gov/media/253280/download    Fact sheet for patients: https://www.fda.gov/media/132427/download    Test performed by PCR.    CBC & Differential [[id number]]  (Abnormal) Collected: 05/07/25 1521    Specimen: Blood Updated: 05/07/25 1527    Narrative:      The following orders were created for panel order CBC & Differential.  Procedure                               Abnormality         Status                     ---------                               -----------         ------                     CBC Auto Differential[[id number]]        Abnormal            Final result                 Please view results for these tests on the individual orders.    Comprehensive Metabolic Panel [[id number]]  (Abnormal) Collected: 05/07/25 1521    Specimen: Blood Updated: 05/07/25 1545     Glucose 121 mg/dL      BUN 11 mg/dL      Creatinine 0.83 mg/dL      Sodium 143 mmol/L      Potassium 4.1 mmol/L      Chloride 104 mmol/L      CO2 24.0 mmol/L      Calcium 10.1 mg/dL      Total Protein 7.2 g/dL      Albumin 4.4 g/dL      ALT (SGPT) 24 U/L      AST (SGOT) 28 U/L      Alkaline Phosphatase 54 U/L      Total Bilirubin 0.4 mg/dL      Globulin 2.8 gm/dL      A/G Ratio 1.6 g/dL      BUN/Creatinine Ratio 13.3     Anion Gap 15.0 mmol/L      eGFR 74.1 mL/min/1.73     Narrative:      GFR Categories in Chronic Kidney Disease (CKD)              GFR Category          GFR (mL/min/1.73)    Interpretation  G1                    90 or greater        Normal or high (1)  G2                    60-89                Mild decrease (1)  G3a                   45-59                Mild to moderate decrease  G3b                   30-44                Moderate to severe decrease  G4                    15-29                Severe decrease  G5                "     14 or less           Kidney failure    (1)In the absence of evidence of kidney disease, neither GFR category G1 or G2 fulfill the criteria for CKD.    eGFR calculation 2021 CKD-EPI creatinine equation, which does not include race as a factor    High Sensitivity Troponin T [[id number]]  (Abnormal) Collected: 05/07/25 1521    Specimen: Blood Updated: 05/07/25 1547     HS Troponin T 30 ng/L     Narrative:      High Sensitive Troponin T Reference Range:  <14.0 ng/L- Negative Female for AMI  <22.0 ng/L- Negative Male for AMI  >=14 - Abnormal Female indicating possible myocardial injury.  >=22 - Abnormal Male indicating possible myocardial injury.   Clinicians would have to utilize clinical acumen, EKG, Troponin, and serial changes to determine if it is an Acute Myocardial Infarction or myocardial injury due to an underlying chronic condition.         C-reactive Protein [[id number]]  (Normal) Collected: 05/07/25 1521    Specimen: Blood Updated: 05/07/25 1548     C-Reactive Protein <0.30 mg/dL     Procalcitonin [[id number]]  (Normal) Collected: 05/07/25 1521    Specimen: Blood Updated: 05/07/25 1704     Procalcitonin 0.04 ng/mL     Narrative:      As a Marker for Sepsis (Non-Neonates):    1. <0.5 ng/mL represents a low risk of severe sepsis and/or septic shock.  2. >2 ng/mL represents a high risk of severe sepsis and/or septic shock.    As a Marker for Lower Respiratory Tract Infections that require antibiotic therapy:    PCT on Admission    Antibiotic Therapy       6-12 Hrs later    >0.5                Strongly Recommended  >0.25 - <0.5        Recommended   0.1 - 0.25          Discouraged              Remeasure/reassess PCT  <0.1                Strongly Discouraged     Remeasure/reassess PCT    As 28 day mortality risk marker: \"Change in Procalcitonin Result\" (>80% or <=80%) if Day 0 (or Day 1) and Day 4 values are available. Refer to http://www.[employer]-pct-calculator.com    Change in PCT <=80%  A decrease of PCT levels " "below or equal to 80% defines a positive change in PCT test result representing a higher risk for 28-day all-cause mortality of patients diagnosed with severe sepsis for septic shock.    Change in PCT >80%  A decrease of PCT levels of more than 80% defines a negative change in PCT result representing a lower risk for 28-day all-cause mortality of patients diagnosed with severe sepsis or septic shock.       Magnesium [[id number]]  (Normal) Collected: 05/07/25 1521    Specimen: Blood Updated: 05/07/25 1545     Magnesium 1.6 mg/dL     D-dimer, Quantitative [[id number]]  (Normal) Collected: 05/07/25 1521    Specimen: Blood Updated: 05/07/25 1551     D-Dimer, Quantitative 0.45 MCGFEU/mL     Narrative:      According to the assay 's published package insert, a normal (<0.50 MCGFEU/mL) D-dimer result in conjunction with a non-high clinical probability assessment, excludes deep vein thrombosis (DVT) and pulmonary embolism (PE) with high sensitivity.    D-dimer values increase with age and this can make VTE exclusion of an older population difficult. To address this, the American College [employer], based on best available evidence and recent guidelines, recommends that clinicians use age-adjusted D-dimer thresholds in patients greater than 50 years of age with: a) a low probability of PE who do not meet all Pulmonary Embolism Rule Out Criteria, or b) in those with intermediate probability of PE.   The formula for an age-adjusted D-dimer cut-off is \"age/100\".  For example, a 60 year old patient would have an age-adjusted cut-off of 0.60 MCGFEU/mL and an 80 year old 0.80 MCGFEU/mL.    CBC Auto Differential [[id number]]  (Abnormal) Collected: 05/07/25 1521    Specimen: Blood Updated: 05/07/25 1527     WBC 10.55 10*3/mm3      RBC 4.04 10*6/mm3      Hemoglobin 12.8 g/dL      Hematocrit 38.5 %      MCV 95.3 fL      MCH 31.7 pg      MCHC 33.2 g/dL      RDW 12.6 %      RDW-SD 44.7 fl      MPV 10.2 fL      Platelets 230 " 10*3/mm3      Neutrophil % 74.1 %      Lymphocyte % 18.8 %      Monocyte % 5.7 %      Eosinophil % 0.5 %      Basophil % 0.5 %      Immature Grans % 0.4 %      Neutrophils, Absolute 7.83 10*3/mm3      Lymphocytes, Absolute 1.98 10*3/mm3      Monocytes, Absolute 0.60 10*3/mm3      Eosinophils, Absolute 0.05 10*3/mm3      Basophils, Absolute 0.05 10*3/mm3      Immature Grans, Absolute 0.04 10*3/mm3      nRBC 0.0 /100 WBC     High Sensitivity Troponin T 1Hr [[id number]]  (Abnormal) Collected: 05/07/25 1634    Specimen: Blood Updated: 05/07/25 1704     HS Troponin T 49 ng/L      Troponin T Numeric Delta 19 ng/L      Troponin T % Delta 63    Narrative:      High Sensitive Troponin T Reference Range:  <14.0 ng/L- Negative Female for AMI  <22.0 ng/L- Negative Male for AMI  >=14 - Abnormal Female indicating possible myocardial injury.  >=22 - Abnormal Male indicating possible myocardial injury.   Clinicians would have to utilize clinical acumen, EKG, Troponin, and serial changes to determine if it is an Acute Myocardial Infarction or myocardial injury due to an underlying chronic condition.                  XR Chest 2 View  Result Date: 5/7/2025  PROCEDURE: XR CHEST 2 VW-  HISTORY: Chest pain; B34.9-Viral infection, unspecified  COMPARISON: None.  FINDINGS: The heart is normal in size. The lungs are clear. The mediastinum is unremarkable. There is no pneumothorax.  There are no acute osseous abnormalities. There is evidence of old calcified granulomatous disease.      Impression: No acute cardiopulmonary process.      This report was signed and finalized on 5/7/2025 4:20 PM by Leticia Allen MD.      CT Head Without Contrast  Result Date: 5/7/2025  PROCEDURE: CT HEAD WO CONTRAST-  HISTORY: Headache, eval intracranial hemorrhage  COMPARISON: None.  TECHNIQUE: Multiple axial CT images were performed from the foramen magnum to the vertex. Individualized dose reduction techniques using automated exposure control or adjustment of  mA and/or kV according to the patient size were employed.  FINDINGS: There is mild, age-appropriate generalized cerebral atrophy. The ventricles are enlarged. There is no evidence of edema or hemorrhage.  No masses are identified. No extra-axial fluid is seen. The paranasal sinuses are unremarkable.      Impression: Atrophy  without acute process.     CTDI: 34.52 mGy DLP:653.85 mGy.cm  This report was signed and finalized on 5/7/2025 2:52 PM by Leticia Allen MD.           MDM      Initial impression of presenting illness: Headache, ear pain    DDX: includes but is not limited to: Eustachian tube dysfunction, viral URI, intracranial hemorrhage    Patient arrives stable with vitals interpreted by myself.     Pertinent features from physical exam: Clear to auscultation, regular rate and rhythm, no murmur, nontender to abdominal palpation.    Initial diagnostic plan: COVID swab, CT head    Results from initial plan were reviewed and interpreted by me revealing patient with no concerning findings on CT head.  As I went to discharge the patient she began to complain of chest pain.  Workup showing NSTEMI with no active MI    Diagnostic information from other sources: Discussed with patient's  at bedside reviewed past medical records    Interventions / Re-evaluation: Given Lovenox due to concerns for NSTEMI    Results/clinical rationale were discussed with patient at bedside    Consultations/Discussion of results with other physicians: Given my concerns for NSTEMI discussed with Dr. Todd, hospitalist, and admitted to her service for further management    Disposition plan: Admit  -----        Final diagnoses:   Viral illness   NSTEMI (non-ST elevated myocardial infarction)          Froilan Holden MD  05/07/25 3216

## 2025-05-07 NOTE — Clinical Note
[facility] EMERGENCY DEPARTMENT  [address] [address]  Phone: [phone]    Reema Brothers was seen and treated in our emergency department on 5/7/2025.  She may return to work on 05/10/2025.         Thank you for choosing [facility].    Froilan Holden MD

## 2025-05-07 NOTE — DISCHARGE INSTRUCTIONS
You were evaluated for ear pain.  We got a viral swab and a CT scan of your head that showed no concerns for any acute process.  As we discussed, we believe you likely have a viral illness causing your ear pain.  We have prescribed you Zyrtec and Flonase to help with the symptoms and would recommend Tylenol at home for headache.  Would follow-up with your primary care doctor to ensure that you improve appropriately.  If you have any new neurologic symptoms weakness in 1 arm or 1 leg or inability ambulate or difficulty speaking please come back to the the emergency department for further evaluation.  Do not soak discharge.

## 2025-05-08 ENCOUNTER — APPOINTMENT (OUTPATIENT)
Dept: CARDIOLOGY | Facility: HOSPITAL | Age: 75
End: 2025-05-08
Payer: COMMERCIAL

## 2025-05-08 VITALS
RESPIRATION RATE: 17 BRPM | HEIGHT: 63 IN | SYSTOLIC BLOOD PRESSURE: 134 MMHG | WEIGHT: 169.31 LBS | OXYGEN SATURATION: 95 % | BODY MASS INDEX: 30 KG/M2 | DIASTOLIC BLOOD PRESSURE: 72 MMHG | TEMPERATURE: 98.1 F | HEART RATE: 75 BPM

## 2025-05-08 LAB
ANION GAP SERPL CALCULATED.3IONS-SCNC: 9.9 MMOL/L (ref 5–15)
AORTIC DIMENSIONLESS INDEX: 0.68 (DI)
AV MEAN PRESS GRAD SYS DOP V1V2: 4 MMHG
AV VMAX SYS DOP: 129 CM/SEC
BASOPHILS # BLD AUTO: 0.05 10*3/MM3 (ref 0–0.2)
BASOPHILS NFR BLD AUTO: 0.7 % (ref 0–1.5)
BH CV ECHO MEAS - AO MAX PG: 6.7 MMHG
BH CV ECHO MEAS - AO ROOT DIAM: 3 CM
BH CV ECHO MEAS - AO V2 VTI: 27.3 CM
BH CV ECHO MEAS - AVA(I,D): 2 CM2
BH CV ECHO MEAS - EDV(CUBED): 35 ML
BH CV ECHO MEAS - EDV(MOD-SP2): 61.6 ML
BH CV ECHO MEAS - EDV(MOD-SP4): 76.8 ML
BH CV ECHO MEAS - EF(MOD-SP2): 28.1 %
BH CV ECHO MEAS - EF(MOD-SP4): 62.5 %
BH CV ECHO MEAS - ESV(CUBED): 15.8 ML
BH CV ECHO MEAS - ESV(MOD-SP2): 44.3 ML
BH CV ECHO MEAS - ESV(MOD-SP4): 28.8 ML
BH CV ECHO MEAS - FS: 23.2 %
BH CV ECHO MEAS - IVS/LVPW: 1.08 CM
BH CV ECHO MEAS - IVSD: 1.11 CM
BH CV ECHO MEAS - LA DIMENSION: 3.7 CM
BH CV ECHO MEAS - LAT PEAK E' VEL: 9.4 CM/SEC
BH CV ECHO MEAS - LV DIASTOLIC VOL/BSA (35-75): 42.7 CM2
BH CV ECHO MEAS - LV MASS(C)D: 103.2 GRAMS
BH CV ECHO MEAS - LV MAX PG: 3.3 MMHG
BH CV ECHO MEAS - LV MEAN PG: 2 MMHG
BH CV ECHO MEAS - LV SYSTOLIC VOL/BSA (12-30): 16 CM2
BH CV ECHO MEAS - LV V1 MAX: 90.2 CM/SEC
BH CV ECHO MEAS - LV V1 VTI: 18.5 CM
BH CV ECHO MEAS - LVIDD: 3.3 CM
BH CV ECHO MEAS - LVIDS: 2.5 CM
BH CV ECHO MEAS - LVOT AREA: 3 CM2
BH CV ECHO MEAS - LVOT DIAM: 1.94 CM
BH CV ECHO MEAS - LVPWD: 1.03 CM
BH CV ECHO MEAS - MED PEAK E' VEL: 9 CM/SEC
BH CV ECHO MEAS - MV A MAX VEL: 114 CM/SEC
BH CV ECHO MEAS - MV DEC SLOPE: 560 CM/SEC2
BH CV ECHO MEAS - MV DEC TIME: 0.18 SEC
BH CV ECHO MEAS - MV E MAX VEL: 101 CM/SEC
BH CV ECHO MEAS - MV E/A: 0.89
BH CV ECHO MEAS - MV MAX PG: 5.7 MMHG
BH CV ECHO MEAS - MV MEAN PG: 4 MMHG
BH CV ECHO MEAS - MV V2 VTI: 34.9 CM
BH CV ECHO MEAS - MVA(VTI): 1.57 CM2
BH CV ECHO MEAS - PA ACC TIME: 0.2 SEC
BH CV ECHO MEAS - PA V2 MAX: 137 CM/SEC
BH CV ECHO MEAS - RV MAX PG: 2.3 MMHG
BH CV ECHO MEAS - RV V1 MAX: 75.8 CM/SEC
BH CV ECHO MEAS - RV V1 VTI: 19 CM
BH CV ECHO MEAS - SV(LVOT): 54.7 ML
BH CV ECHO MEAS - SV(MOD-SP2): 17.3 ML
BH CV ECHO MEAS - SV(MOD-SP4): 48 ML
BH CV ECHO MEAS - SVI(LVOT): 30.4 ML/M2
BH CV ECHO MEAS - SVI(MOD-SP2): 9.6 ML/M2
BH CV ECHO MEAS - SVI(MOD-SP4): 26.7 ML/M2
BH CV ECHO MEAS - TAPSE (>1.6): 2.24 CM
BH CV ECHO MEASUREMENTS AVERAGE E/E' RATIO: 10.98
BH CV XLRA - RV BASE: 3.4 CM
BH CV XLRA - RV LENGTH: 8 CM
BH CV XLRA - RV MID: 3.3 CM
BH CV XLRA - TDI S': 14.7 CM/SEC
BUN SERPL-MCNC: 14 MG/DL (ref 8–23)
BUN/CREAT SERPL: 16.7 (ref 7–25)
CALCIUM SPEC-SCNC: 9.3 MG/DL (ref 8.6–10.5)
CHLORIDE SERPL-SCNC: 110 MMOL/L (ref 98–107)
CHOLEST SERPL-MCNC: 111 MG/DL (ref 0–200)
CO2 SERPL-SCNC: 27.1 MMOL/L (ref 22–29)
CREAT SERPL-MCNC: 0.84 MG/DL (ref 0.57–1)
DEPRECATED RDW RBC AUTO: 44.8 FL (ref 37–54)
EGFRCR SERPLBLD CKD-EPI 2021: 73 ML/MIN/1.73
EOSINOPHIL # BLD AUTO: 0.09 10*3/MM3 (ref 0–0.4)
EOSINOPHIL NFR BLD AUTO: 1.2 % (ref 0.3–6.2)
ERYTHROCYTE [DISTWIDTH] IN BLOOD BY AUTOMATED COUNT: 12.7 % (ref 12.3–15.4)
GLUCOSE SERPL-MCNC: 124 MG/DL (ref 65–99)
HBA1C MFR BLD: 6.8 % (ref 4.8–5.6)
HCT VFR BLD AUTO: 35 % (ref 34–46.6)
HDLC SERPL-MCNC: 39 MG/DL (ref 40–60)
HGB BLD-MCNC: 11.3 G/DL (ref 12–15.9)
IMM GRANULOCYTES # BLD AUTO: 0.02 10*3/MM3 (ref 0–0.05)
IMM GRANULOCYTES NFR BLD AUTO: 0.3 % (ref 0–0.5)
LDLC SERPL CALC-MCNC: 52 MG/DL (ref 0–100)
LDLC/HDLC SERPL: 1.3 {RATIO}
LEFT ATRIUM VOLUME INDEX: 18.7 ML/M2
LV EF 3D SEGMENTATION: 63 %
LYMPHOCYTES # BLD AUTO: 2.82 10*3/MM3 (ref 0.7–3.1)
LYMPHOCYTES NFR BLD AUTO: 38 % (ref 19.6–45.3)
MCH RBC QN AUTO: 31.3 PG (ref 26.6–33)
MCHC RBC AUTO-ENTMCNC: 32.3 G/DL (ref 31.5–35.7)
MCV RBC AUTO: 97 FL (ref 79–97)
MONOCYTES # BLD AUTO: 0.61 10*3/MM3 (ref 0.1–0.9)
MONOCYTES NFR BLD AUTO: 8.2 % (ref 5–12)
NEUTROPHILS NFR BLD AUTO: 3.83 10*3/MM3 (ref 1.7–7)
NEUTROPHILS NFR BLD AUTO: 51.6 % (ref 42.7–76)
NRBC BLD AUTO-RTO: 0 /100 WBC (ref 0–0.2)
PLATELET # BLD AUTO: 200 10*3/MM3 (ref 140–450)
PMV BLD AUTO: 10.6 FL (ref 6–12)
POTASSIUM SERPL-SCNC: 4.2 MMOL/L (ref 3.5–5.2)
RBC # BLD AUTO: 3.61 10*6/MM3 (ref 3.77–5.28)
SODIUM SERPL-SCNC: 147 MMOL/L (ref 136–145)
TRIGL SERPL-MCNC: 107 MG/DL (ref 0–150)
TROPONIN T SERPL HS-MCNC: 172 NG/L
VLDLC SERPL-MCNC: 20 MG/DL (ref 5–40)
WBC NRBC COR # BLD AUTO: 7.42 10*3/MM3 (ref 3.4–10.8)

## 2025-05-08 PROCEDURE — 99222 1ST HOSP IP/OBS MODERATE 55: CPT | Performed by: INTERNAL MEDICINE

## 2025-05-08 PROCEDURE — 25010000002 ENOXAPARIN PER 10 MG: Performed by: INTERNAL MEDICINE

## 2025-05-08 PROCEDURE — 99239 HOSP IP/OBS DSCHRG MGMT >30: CPT | Performed by: INTERNAL MEDICINE

## 2025-05-08 PROCEDURE — 93306 TTE W/DOPPLER COMPLETE: CPT | Performed by: INTERNAL MEDICINE

## 2025-05-08 PROCEDURE — 84484 ASSAY OF TROPONIN QUANT: CPT | Performed by: INTERNAL MEDICINE

## 2025-05-08 PROCEDURE — 85025 COMPLETE CBC W/AUTO DIFF WBC: CPT | Performed by: INTERNAL MEDICINE

## 2025-05-08 PROCEDURE — 80048 BASIC METABOLIC PNL TOTAL CA: CPT | Performed by: INTERNAL MEDICINE

## 2025-05-08 PROCEDURE — 93306 TTE W/DOPPLER COMPLETE: CPT

## 2025-05-08 PROCEDURE — 80061 LIPID PANEL: CPT | Performed by: INTERNAL MEDICINE

## 2025-05-08 PROCEDURE — 83036 HEMOGLOBIN GLYCOSYLATED A1C: CPT | Performed by: INTERNAL MEDICINE

## 2025-05-08 RX ORDER — ASPIRIN 81 MG/1
324 TABLET, CHEWABLE ORAL ONCE
Status: COMPLETED | OUTPATIENT
Start: 2025-05-08 | End: 2025-05-08

## 2025-05-08 RX ORDER — NITROGLYCERIN 0.4 MG/1
0.4 TABLET SUBLINGUAL
Qty: 25 TABLET | Refills: 12 | Status: SHIPPED | OUTPATIENT
Start: 2025-05-08

## 2025-05-08 RX ORDER — ECHINACEA PURPUREA EXTRACT 125 MG
2 TABLET ORAL
Start: 2025-05-08

## 2025-05-08 RX ORDER — ENOXAPARIN SODIUM 100 MG/ML
1 INJECTION SUBCUTANEOUS EVERY 12 HOURS
Start: 2025-05-08 | End: 2025-05-16

## 2025-05-08 RX ORDER — ASPIRIN 81 MG/1
81 TABLET ORAL DAILY
Qty: 30 TABLET | Refills: 0 | Status: SHIPPED | OUTPATIENT
Start: 2025-05-08 | End: 2025-06-07

## 2025-05-08 RX ORDER — ECHINACEA PURPUREA EXTRACT 125 MG
2 TABLET ORAL
Status: DISCONTINUED | OUTPATIENT
Start: 2025-05-08 | End: 2025-05-08 | Stop reason: HOSPADM

## 2025-05-08 RX ORDER — ATORVASTATIN CALCIUM 80 MG/1
80 TABLET, FILM COATED ORAL NIGHTLY
Qty: 90 TABLET | Refills: 1 | Status: SHIPPED | OUTPATIENT
Start: 2025-05-08

## 2025-05-08 RX ADMIN — ASPIRIN 81 MG CHEWABLE TABLET 324 MG: 81 TABLET CHEWABLE at 12:21

## 2025-05-08 RX ADMIN — Medication 10 ML: at 09:23

## 2025-05-08 RX ADMIN — ENOXAPARIN SODIUM 80 MG: 100 INJECTION SUBCUTANEOUS at 06:15

## 2025-05-08 RX ADMIN — Medication 2 SPRAY: at 10:23

## 2025-05-08 NOTE — CASE MANAGEMENT/SOCIAL WORK
Discharge Planning Assessment  [facility]     Patient Name: Reema Brothers  MRN: [id number]  Today's Date: 5/8/2025    Admit Date: 5/7/2025        Discharge Needs Assessment       Row Name 05/08/25 1040       Living Environment    People in Home spouse    Name(s) of People in Home Raj Brothers spouse    Duration at Residence 7yrs    Potentially Unsafe Housing Conditions none    In the past 12 months has the electric, gas, oil, or water company threatened to shut off services in your home? No    Primary Care Provided by self    Family Caregiver if Needed child(slick), adult;spouse    Quality of Family Relationships involved;helpful    Able to Return to Prior Arrangements yes       Resource/Environmental Concerns    Transportation Concerns none       Transportation Needs    In the past 12 months, has lack of transportation kept you from medical appointments or from getting medications? no    In the past 12 months, has lack of transportation kept you from meetings, work, or from getting things needed for daily living? No       Food Insecurity    Within the past 12 months, you worried that your food would run out before you got the money to buy more. Never true    Within the past 12 months, the food you bought just didn't last and you didn't have money to get more. Never true       Discharge Needs Assessment    Readmission Within the Last 30 Days no previous admission in last 30 days    Do you want help finding or keeping work or a job? I do not need or want help    Do you want help with school or training? For example, starting or completing job training or getting a high school diploma, GED or equivalent No    Anticipated Changes Related to Illness none    Discharge Facility/Level of Care Needs acute care hospital                   Discharge Plan       Row Name 05/08/25 1042       Plan    Plan Comments Met with pt and family, pt provided demographics. She has no AD, POA, DME, or financial hardship. Dr. Gill  is primary care and [facility] is phamracy iused. She and her spouse have been at current address 7 yrs. She drives, is independent, plans to return home. States she just saw Dr. Avilez and will be transferred to [facility] for cardiology service her family uses thru [facility].                       Demographic Summary       Row Name 05/08/25 0910       General Information    Admission Type inpatient    Arrived From emergency department    Referral Source admission list    Reason for Consult discharge planning    Preferred Language English       Contact Information    Permission Granted to Share Info With family/designee                   Functional Status       Row Name 05/08/25 1040       Functional Status    Usual Activity Tolerance good    Current Activity Tolerance good       Functional Status, IADL    Medications independent    Meal Preparation independent    Housekeeping independent    Laundry independent    Shopping independent    If for any reason you need help with day-to-day activities such as bathing, preparing meals, shopping, managing finances, etc., do you get the help you need? I don't need any help       Mental Status    General Appearance WDL WDL       Mental Status Summary    Recent Changes in Mental Status/Cognitive Functioning no changes       Employment/    Employment Status retired                   Psychosocial    No documentation.                  Abuse/Neglect    No documentation.                  Legal    No documentation.                  Substance Abuse    No documentation.                  Patient Forms    No documentation.                     Pastora Chase RN

## 2025-05-08 NOTE — PLAN OF CARE
Goal Outcome Evaluation:              Outcome Evaluation: VSS. No acute evenst during this shift. No concerns voiced at this time.

## 2025-05-08 NOTE — CONSULTS
"[facility] Consult Note    Referring Provider: Rebecca  Reason for Consultation: NSTEMI    Patient Care Team:  Ronaldo Gill DO as PCP - General (Family Medicine)  Mariana Tim MD as Consulting Physician (General Surgery)  Kevan Leon MD as Consulting Physician (General Surgery)  Raj Shoemaker MD as Consulting Physician (Orthopedic Surgery)    Chief complaint : Right ear pain    Subjective:    History of present illness: This is a 75-year-old female patient with no history of prior coronary artery disease who presented to the emergency room with severe pain in her right ear.  While there she mentioned that she had also been having intermittent discomfort in her right bicep described as a \"squeezing sensation\".  Her twelve-lead electrocardiogram showed sinus rhythm and otherwise normal tracing.  Her initial cardiac troponin was elevated and has trended upward.  She is currently asymptomatic.  She has a history of hypertension and dyslipidemia.  She is a former smoker but stopped smoking in 2020.  She reports having a prior nuclear stress test but this was several decades ago.    Review of Systems   Review of Systems   Constitutional: Negative for chills, diaphoresis, fever, malaise/fatigue, weight gain and weight loss.   HENT:  Positive for ear pain. Negative for ear discharge, hearing loss, hoarse voice and nosebleeds.    Eyes:  Negative for discharge, double vision, pain and photophobia.   Cardiovascular:  Negative for chest pain, claudication, cyanosis, dyspnea on exertion, irregular heartbeat, leg swelling, near-syncope, orthopnea, palpitations, paroxysmal nocturnal dyspnea and syncope.   Respiratory:  Negative for cough, hemoptysis, sputum production and wheezing.    Endocrine: Negative for cold intolerance, heat intolerance, polydipsia, polyphagia and polyuria.   Hematologic/Lymphatic: Negative for adenopathy and bleeding problem. Does not bruise/bleed easily.   Skin:  Negative for color " change, flushing, itching and rash.   Musculoskeletal:  Negative for muscle cramps, muscle weakness, myalgias and stiffness.   Gastrointestinal:  Negative for abdominal pain, diarrhea, hematemesis, hematochezia, nausea and vomiting.   Genitourinary:  Negative for dysuria, frequency and nocturia.   Neurological:  Negative for focal weakness, loss of balance, numbness, paresthesias and seizures.   Psychiatric/Behavioral:  Negative for altered mental status, hallucinations and suicidal ideas.    Allergic/Immunologic: Negative for HIV exposure, hives and persistent infections.       History  Past Medical History:   Diagnosis Date    Allergic rhinitis     Arthritis     Diabetes mellitus     Disease of thyroid gland     GERD (gastroesophageal reflux disease)     Hyperlipidemia     Hypertension     NSTEMI (non-ST elevated myocardial infarction) 5/7/2025    Rheumatic fever    ,   Past Surgical History:   Procedure Laterality Date    BREAST BIOPSY Left     x 10 yrs ago    BREAST SURGERY      CHOLECYSTECTOMY      HAND SURGERY Right 09/06/2024    Right hand carpal tunnel release, Right hand flexor tenolysis of FDS and FDP to the index, middle, ring and small fingers, Right middle finger trigger release, Right ring finger trigger finger release-- Dr. Shoemaker    KNEE SURGERY      SHOULDER SURGERY      TUBAL ABDOMINAL LIGATION      WRIST SURGERY     ,   Family History   Problem Relation Age of Onset    Arthritis Other     Thyroid disease Other     Diabetes Other     Obesity Other     Kidney disease Other     Liver disease Other     No Known Problems Mother     No Known Problems Father     No Known Problems Sister     No Known Problems Brother     Breast cancer Maternal Aunt    ,   Social History     Tobacco Use    Smoking status: Former     Current packs/day: 0.00     Average packs/day: 1 pack/day for 53.0 years (53.0 ttl pk-yrs)     Types: Electronic Cigarette, Cigarettes     Start date: 1967     Quit date: 2020     Years since  quittin.3     Passive exposure: Past    Smokeless tobacco: Never    Tobacco comments:     Pt stated that she does vapor cigarette   Vaping Use    Vaping status: Former   Substance Use Topics    Alcohol use: No    Drug use: No   ,   Medications Prior to Admission   Medication Sig Dispense Refill Last Dose/Taking    Blood Glucose Monitoring Suppl (D-CARE GLUCOMETER) w/Device kit 1 unit marking on U-100 syringe 3 (Three) Times a Day As Needed (as needed). 1 kit 0 2025    fluconazole (Diflucan) 100 MG tablet Take 1 tablet by mouth Every 72 (Seventy-Two) Hours. 1 tablet now followed by 1 tablet in 72 hours if symptoms remain 2 tablet 0 2025    glimepiride (Amaryl) 4 MG tablet Take 1 tablet by mouth 2 (Two) Times a Day. 180 tablet 3 2025    glucose blood test strip Use as instructed 100 each 12 2025    Lancets misc Use as directed 100 each 12 2025    levothyroxine (SYNTHROID, LEVOTHROID) 88 MCG tablet Take 1 tablet by mouth Daily.   2025    lisinopril (PRINIVIL,ZESTRIL) 5 MG tablet Take 1 tablet by mouth Daily. 90 tablet 3 2025    lovastatin (MEVACOR) 40 MG tablet Take 1 tablet by mouth Daily. 90 tablet 3 2025    metFORMIN (GLUCOPHAGE) 1000 MG tablet Take 1 tablet by mouth twice daily 180 tablet 2 2025    oxyCODONE (ROXICODONE) 5 MG immediate release tablet Take 1 tablet by mouth Every 6 (Six) Hours As Needed for Severe Pain. 12 tablet 0 Past Month    amoxicillin-clavulanate (AUGMENTIN) 875-125 MG per tablet Take 1 tablet by mouth 2 (Two) Times a Day. 14 tablet 0     Cholecalciferol (VITAMIN D3) 400 UNITS capsule Take 1 tablet by mouth daily.       clotrimazole-betamethasone (LOTRISONE) 1-0.05 % cream APPLY CREAM TOPICALLY TO AFFECTED AND SURROUNDING AREAS TWICE DAILY IN THE MORNING AND IN THE EVENING FOR 2 WEEKS       cyanocobalamin (VITAMIN B-12N) 50 MCG tablet Take 1 tablet by mouth Daily.       famotidine (PEPCID) 40 MG tablet Take 1 tablet every day by oral route at bedtime for  "90 days.       gabapentin (NEURONTIN) 100 MG capsule Take 1 capsule by mouth 3 (Three) Times a Day.   Unknown    triamcinolone (KENALOG) 0.1 % cream APPLY A THIN LAYER TO THE AFFECTED AREA(S) BY TOPICAL ROUTE 2 TIMES PER DAY   Unknown    and Allergies:  Codeine and Nubain [nalbuphine]    Objective:    Vital Sign Min/Max for last 24 hours  Temp  Min: 97.8 °F (36.6 °C)  Max: 98.8 °F (37.1 °C)   BP  Min: 109/49  Max: 176/88   Pulse  Min: 75  Max: 103   Resp  Min: 16  Max: 19   SpO2  Min: 93 %  Max: 98 %   No data recorded   Weight  Min: 76.8 kg (169 lb 5 oz)  Max: 80 kg (176 lb 5.9 oz)     Flowsheet Rows      Flowsheet Row First Filed Value   Admission Height 161 cm (63.39\") Documented at 05/07/2025 1335   Admission Weight 80 kg (176 lb 5.9 oz) Documented at 05/07/2025 1335                 Physical Exam:   Vitals and nursing note reviewed.   Constitutional:       Appearance: Healthy appearance. Not in distress.   Neck:      Vascular: No JVR. JVD normal.   Pulmonary:      Effort: Pulmonary effort is normal.      Breath sounds: Normal breath sounds. No wheezing. No rhonchi. No rales.   Chest:      Chest wall: Not tender to palpatation.   Cardiovascular:      PMI at left midclavicular line. Normal rate. Regular rhythm. Normal S1. Normal S2.       Murmurs: There is no murmur.      No gallop.  No click. No rub.   Pulses:     Intact distal pulses.   Edema:     Peripheral edema absent.   Abdominal:      General: Bowel sounds are normal.      Palpations: Abdomen is soft.      Tenderness: There is no abdominal tenderness.   Musculoskeletal: Normal range of motion.         General: No tenderness. Skin:     General: Skin is warm and dry.   Neurological:      General: No focal deficit present.      Mental Status: Alert and oriented to person, place and time.         Results Review:   I reviewed the patient's new clinical results.  Results from last 7 days   Lab Units 05/08/25  0549 05/07/25  1521   WBC 10*3/mm3 7.42 10.55 "   HEMOGLOBIN g/dL 11.3* 12.8   HEMATOCRIT % 35.0 38.5   PLATELETS 10*3/mm3 200 230     Results from last 7 days   Lab Units 05/08/25  0549 05/07/25  1521   SODIUM mmol/L 147* 143   POTASSIUM mmol/L 4.2 4.1   CHLORIDE mmol/L 110* 104   CO2 mmol/L 27.1 24.0   BUN mg/dL 14 11   CREATININE mg/dL 0.84 0.83   GLUCOSE mg/dL 124* 121*   CALCIUM mg/dL 9.3 10.1     Lab Results   Lab Value Date/Time    TROPONINT 172 (C) 05/08/2025 0008    TROPONINT 49 (H) 05/07/2025 1634    TROPONINT 30 (H) 05/07/2025 1521     Results from last 7 days   Lab Units 05/08/25  0549   CHOLESTEROL mg/dL 111   TRIGLYCERIDES mg/dL 107   HDL CHOL mg/dL 39*   LDL CHOL mg/dL 52         Assessment/Plan:      NSTEMI (non-ST elevated myocardial infarction)      I have recommended invasive coronary angiography with interventional standby.  Ms. Brothers has been engaged in a patient level discussion explaining the rationale for invasive procedure.  The procedure of coronary angiography with catheter-based coronary revascularization standby from an anticipated radial approach has been explained in detail, using layman's terminology, including risks, benefits and alternatives.  She expresses understanding but indicates she would prefer to have this done in [place].  Both her  and her son are established patients of the [facility] cardiology group.  I have spoken to the [facility] transfer center and they are in the process of contacting [facility] cardiology to arrange transfer by ambulance to that facility.    I discussed the patient's findings and my recommendations with patient    Lauri Avilez MD  05/08/25  09:55 EDT

## 2025-05-08 NOTE — DISCHARGE SUMMARY
[facility]   DISCHARGE SUMMARY      Name:  Reema Brothers   Age:  75 y.o.  Sex:  female  :  1950  MRN:  [id number]   Visit Number:  [id number]    Admission Date:  2025  Date of Discharge:  2025  Primary Care Physician:  Ronaldo Gill, DO    Important issues to note:    Start: aspirin, lovenox, lipitor  Stop: lovastatin  Follow up: PCP and Cardiology  Brief Summary: Presented with Ear ache and arm pain due to NSTEMI. Initially had seen cardiology which recommended transfer to [facility] by the time of discharge.    Discharge Diagnoses:     NSTEMI  Hypertension  Hyperlipidemia  Hypothyroidism  Type 2 diabetes, non-insulin-dependent      Problem List:     Active Hospital Problems    Diagnosis  POA    **NSTEMI (non-ST elevated myocardial infarction) [I21.4]  Yes      Resolved Hospital Problems   No resolved problems to display.     Presenting Problem:    Chief Complaint   Patient presents with    Earache      Consults:     Consulting Physician(s)         Provider   Role Specialty     Lauri Avilez MD      Consulting Physician Cardiology          May 8, 2025: Admitting provider documentation reviewed.  Significant troponin delta overnight. Ordered aspirin    History Of Presenting Illness:       Patient is a pleasant 75-year-old female with history significant for hypertension hyperlipidemia who presents to the emergency room with complaints of right ear pain.  Patient reports that she has been having ear pain for the last week.  She saw primary physician who recently prescribed antibiotic for left ear infection.  Patient reports that left ear has improved but right ear was so painful today she cried prompting  and son to bring her to the emergency room.  While being evaluated in the emergency room for ear pain, workup unremarkable and patient was plan for discharge home.  However, when ER physician went to reevaluate patient to tell her of disposition she  "began complaining of left arm pain/numbness with radiation into the neck.  Denied specific chest pain but stated \"she just did not feel right\".  Cardiac workup initiated.  Troponins elevated and uptrending.  As such, patient received therapeutic Lovenox dosing and hospitalist asked to admit.  At the time of my exam patient resting comfortably and states that both left arm pain and right ear pain are improving.      Hospital Course:       NSTEMI  Uptrending troponins, 30-49--172 .  Will continue to trend  Consulted Dr. Avilez.  Recommend transfer  Therapeutic Lovenox  High intensity statin  Will obtain lipid panel and A1c  Echo  Aspirin       DVT Prophylaxis: Lovenox  Code Status: Full  Diet: NPO  Disposition: DC to [facility].  Edited by: Davey Fernandez DO at 5/8/2025 1025      Vital Signs:    Temp:  [97.8 °F (36.6 °C)-98.8 °F (37.1 °C)] 98.2 °F (36.8 °C)  Heart Rate:  [] 75  Resp:  [16-19] 17  BP: (109-176)/(49-88) 131/70    Physical Exam:    Constitutional: No acute distress, awake, alert  HENT: NCAT, mucous membranes moist  Respiratory: Clear to auscultation bilaterally, respiratory effort normal   Cardiovascular: RRR, no murmurs, rubs, or gallops  Gastrointestinal: Positive bowel sounds, soft, nontender, nondistended  Musculoskeletal: No bilateral ankle edema  Psychiatric: Appropriate affect, cooperative  Neurologic: Oriented x 3, speech clear  Skin: No rashes  Edited by: Davey Fernandez DO at 5/8/2025 0748    Pertinent Lab Results:     Results from last 7 days   Lab Units 05/08/25  0549 05/07/25  1521   SODIUM mmol/L 147* 143   POTASSIUM mmol/L 4.2 4.1   CHLORIDE mmol/L 110* 104   CO2 mmol/L 27.1 24.0   BUN mg/dL 14 11   CREATININE mg/dL 0.84 0.83   CALCIUM mg/dL 9.3 10.1   BILIRUBIN mg/dL  --  0.4   ALK PHOS U/L  --  54   ALT (SGPT) U/L  --  24   AST (SGOT) U/L  --  28   GLUCOSE mg/dL 124* 121*     Results from last 7 days   Lab Units 05/08/25  0549 05/07/25  1521   WBC 10*3/mm3 7.42 10.55 "   HEMOGLOBIN g/dL 11.3* 12.8   HEMATOCRIT % 35.0 38.5   PLATELETS 10*3/mm3 200 230         Results from last 7 days   Lab Units 05/08/25  0008 05/07/25  1634 05/07/25  1521   HSTROP T ng/L 172* 49* 30*                           Pertinent Radiology Results:    Imaging Results (All)       Procedure Component Value Units Date/Time    XR Chest 2 View [[id number]] Collected: 05/07/25 1620     Updated: 05/07/25 1622    Narrative:      PROCEDURE: XR CHEST 2 VW-     HISTORY: Chest pain; B34.9-Viral infection, unspecified     COMPARISON: None.     FINDINGS: The heart is normal in size. The lungs are clear. The  mediastinum is unremarkable. There is no pneumothorax.  There are no  acute osseous abnormalities. There is evidence of old calcified  granulomatous disease.       Impression:      No acute cardiopulmonary process.                 This report was signed and finalized on 5/7/2025 4:20 PM by Leticia Allen MD.       CT Head Without Contrast [[id number]] Collected: 05/07/25 1451     Updated: 05/07/25 1454    Narrative:      PROCEDURE: CT HEAD WO CONTRAST-     HISTORY: Headache, eval intracranial hemorrhage     COMPARISON: None.     TECHNIQUE: Multiple axial CT images were performed from the foramen  magnum to the vertex. Individualized dose reduction techniques using  automated exposure control or adjustment of mA and/or kV according to  the patient size were employed.     FINDINGS: There is mild, age-appropriate generalized cerebral atrophy.  The ventricles are enlarged. There is no evidence of edema or  hemorrhage.  No masses are identified. No extra-axial fluid is seen. The  paranasal sinuses are unremarkable.       Impression:      Atrophy  without acute process.              CTDI: 34.52 mGy  DLP:653.85 mGy.cm     This report was signed and finalized on 5/7/2025 2:52 PM by Leticia Allen MD.               Echo:      Condition on Discharge:      Stable.    Code status during the hospital stay:    Code Status and Medical  Interventions: CPR (Attempt to Resuscitate); Full Support   Ordered at: 05/07/25 1729     Code Status (Patient has no pulse and is not breathing):    CPR (Attempt to Resuscitate)     Medical Interventions (Patient has pulse or is breathing):    Full Support     Discharge Disposition:        Discharge Medications:       Discharge Medications        New Medications        Instructions Start Date   aspirin 81 MG EC tablet   81 mg, Oral, Daily      atorvastatin 80 MG tablet  Commonly known as: LIPITOR   80 mg, Oral, Nightly      cetirizine 10 MG tablet  Commonly known as: zyrTEC   10 mg, Oral, Daily      enoxaparin sodium 80 MG/0.8ML solution prefilled syringe syringe  Commonly known as: LOVENOX   1 mg/kg (80 mg), Subcutaneous, Every 12 Hours      fluticasone 50 MCG/ACT nasal spray  Commonly known as: FLONASE   2 sprays, Nasal, Daily      nitroglycerin 0.4 MG SL tablet  Commonly known as: NITROSTAT   0.4 mg, Sublingual, Every 5 Minutes PRN, Take no more than 3 doses in 15 minutes.             Continue These Medications        Instructions Start Date   amoxicillin-clavulanate 875-125 MG per tablet  Commonly known as: AUGMENTIN   1 tablet, Oral, 2 Times Daily      clotrimazole-betamethasone 1-0.05 % cream  Commonly known as: LOTRISONE   APPLY CREAM TOPICALLY TO AFFECTED AND SURROUNDING AREAS TWICE DAILY IN THE MORNING AND IN THE EVENING FOR 2 WEEKS      cyanocobalamin 50 MCG tablet  Commonly known as: VITAMIN B-12N   50 mcg, Daily      D-Care Glucometer w/Device kit   1 unit marking on U-100 syringe, Not Applicable, 3 Times Daily PRN      famotidine 40 MG tablet  Commonly known as: PEPCID   Take 1 tablet every day by oral route at bedtime for 90 days.      fluconazole 100 MG tablet  Commonly known as: Diflucan   100 mg, Oral, Every 72 Hours, 1 tablet now followed by 1 tablet in 72 hours if symptoms remain      gabapentin 100 MG capsule  Commonly known as: NEURONTIN   1 capsule, 3 Times Daily      glimepiride 4 MG  tablet  Commonly known as: Amaryl   4 mg, Oral, 2 Times Daily      glucose blood test strip   Use as instructed      Lancets misc   Use as directed      levothyroxine 88 MCG tablet  Commonly known as: SYNTHROID, LEVOTHROID   1 tablet, Daily      lisinopril 5 MG tablet  Commonly known as: PRINIVIL,ZESTRIL   5 mg, Oral, Daily      metFORMIN 1000 MG tablet  Commonly known as: GLUCOPHAGE   Take 1 tablet by mouth twice daily      oxyCODONE 5 MG immediate release tablet  Commonly known as: ROXICODONE   5 mg, Oral, Every 6 Hours PRN      triamcinolone 0.1 % cream  Commonly known as: KENALOG   APPLY A THIN LAYER TO THE AFFECTED AREA(S) BY TOPICAL ROUTE 2 TIMES PER DAY      Vitamin D3 10 MCG (400 UNIT) capsule   1 tablet, Daily             Stop These Medications      lovastatin 40 MG tablet  Commonly known as: MEVACOR            Discharge Diet:     Diet Instructions       Advance Diet As Tolerated -Target Diet: NPO sips with meds      Target Diet: NPO sips with meds          Activity at Discharge:       Follow-up Appointments:    Additional Instructions for the Follow-ups that You Need to Schedule       Discharge Follow-up with PCP   As directed       Currently Documented PCP:    Ronaldo Gill DO    PCP Phone Number:    [phone]     Follow Up Details: 1 week        Discharge Follow-up with Specified Provider: [facility]; 1 Month   As directed      To: [facility]   Follow Up: 1 Month               Follow-up Information       Schedule an appointment as soon as possible for a visit  with Ronaldo Gill DO.    Specialties: Family Medicine, Hospitalist  Contact information:  [address] [id number] [phone]               Ronaldo Gill DO .    Specialties: Family Medicine, Hospitalist  Why: 1 week  Contact information:  [address] [id number]  [phone]                           Future Appointments   Date Time Provider Department Center   6/18/2025   8:15 AM Ronaldo Gill DO [facility]   7/24/2025 10:10 AM Raj Shoemaker MD [facility]     Test Results Pending at Discharge:           Davey Fernandez DO  05/08/25  10:32 EDT    Time: I spent 45 minutes on this discharge activity which included: face-to-face encounter with the patient, reviewing the data in the system, coordination of the care with the nursing staff as well as consultants, documentation, and entering orders.     Dictated utilizing Dragon dictation.

## 2025-05-09 NOTE — PAYOR COMM NOTE
"To:  ANA LAURA  From: Cony Lewis RN  Phone: [phone]  Fax: [phone]  NPI: [id number]  TIN: [id number]  Member ID: [id number]   MRN: [id number]    Reema Brothers (75 y.o. Female)       Date of Birth   1950    Social Security Number       Address   [address] [address]    Home Phone   [phone]    MRN   [id number]       Confucianist   None    Marital Status                               Admission Date   2025    Admission Type   Emergency    Admitting Provider   Alexys Todd DO    Attending Provider       Department, Room/Bed   [facility] TELEMETRY 3, 322/1       Discharge Date   2025    Discharge Disposition   [facility]    Discharge Destination   Other                              Attending Provider: (none)   Allergies: Codeine, Nubain [Nalbuphine]    Isolation: None   Infection: None   Code Status: Prior    Ht: 161 cm (63.39\")   Wt: 76.8 kg (169 lb 5 oz)    Admission Cmt: None   Principal Problem: NSTEMI (non-ST elevated myocardial infarction) [I21.4]                   Active Insurance as of 2025       Primary Coverage       Payor Plan Insurance Group Employer/Plan Group    [place] [id number]       Payor Plan Address Payor Plan Phone Number Payor Plan Fax Number Effective Dates    [address]   2016 - None Entered    [place] [id number]         Subscriber Name Subscriber Birth Date Member ID       [id number] 1948 [id number]                     Emergency Contacts        (Rel.) Home Phone Work Phone Mobile Phone    ZevRaj (Spouse) [phone] -- [phone]    Raj Brothers  [phone] -- --                 Discharge Summary        Davey Fernandez DO at [facility]              [facility] ERIST   DISCHARGE SUMMARY      Name:  Reema Brothers   Age:  75 y.o.  Sex:  female  :  1950  MRN:  [id number]   Visit Number:  " "[id number]    Admission Date:  5/7/2025  Date of Discharge:  5/8/2025  Primary Care Physician:  Ronaldo Gill, DO    Important issues to note:    Start: aspirin, lovenox, lipitor  Stop: lovastatin  Follow up: PCP and Cardiology  Brief Summary: Presented with Ear ache and arm pain due to NSTEMI. Initially had seen cardiology which recommended transfer to [facility] by the time of discharge.    Discharge Diagnoses:     NSTEMI  Hypertension  Hyperlipidemia  Hypothyroidism  Type 2 diabetes, non-insulin-dependent      Problem List:     Active Hospital Problems    Diagnosis  POA    **NSTEMI (non-ST elevated myocardial infarction) [I21.4]  Yes      Resolved Hospital Problems   No resolved problems to display.     Presenting Problem:    Chief Complaint   Patient presents with    Earache      Consults:     Consulting Physician(s)         Provider   Role Lauri Osborn MD      Consulting Physician Cardiology          May 8, 2025: Admitting provider documentation reviewed.  Significant troponin delta overnight. Ordered aspirin    History Of Presenting Illness:       Patient is a pleasant 75-year-old female with history significant for hypertension hyperlipidemia who presents to the emergency room with complaints of right ear pain.  Patient reports that she has been having ear pain for the last week.  She saw primary physician who recently prescribed antibiotic for left ear infection.  Patient reports that left ear has improved but right ear was so painful today she cried prompting  and son to bring her to the emergency room.  While being evaluated in the emergency room for ear pain, workup unremarkable and patient was plan for discharge home.  However, when ER physician went to reevaluate patient to tell her of disposition she began complaining of left arm pain/numbness with radiation into the neck.  Denied specific chest pain but stated \"she just did not feel right\".  Cardiac workup initiated.  " Troponins elevated and uptrending.  As such, patient received therapeutic Lovenox dosing and hospitalist asked to admit.  At the time of my exam patient resting comfortably and states that both left arm pain and right ear pain are improving.      Hospital Course:       NSTEMI  Uptrending troponins, 30-49--172 .  Will continue to trend  Consulted Dr. Avilez.  Recommend transfer  Therapeutic Lovenox  High intensity statin  Will obtain lipid panel and A1c  Echo  Aspirin       DVT Prophylaxis: Lovenox  Code Status: Full  Diet: NPO  Disposition: DC to [facility].  Edited by: Davey Fernandez DO at 5/8/2025 1025      Vital Signs:    Temp:  [97.8 °F (36.6 °C)-98.8 °F (37.1 °C)] 98.2 °F (36.8 °C)  Heart Rate:  [] 75  Resp:  [16-19] 17  BP: (109-176)/(49-88) 131/70    Physical Exam:    Constitutional: No acute distress, awake, alert  HENT: NCAT, mucous membranes moist  Respiratory: Clear to auscultation bilaterally, respiratory effort normal   Cardiovascular: RRR, no murmurs, rubs, or gallops  Gastrointestinal: Positive bowel sounds, soft, nontender, nondistended  Musculoskeletal: No bilateral ankle edema  Psychiatric: Appropriate affect, cooperative  Neurologic: Oriented x 3, speech clear  Skin: No rashes  Edited by: Davey Fernandez DO at 5/8/2025 0748    Pertinent Lab Results:     Results from last 7 days   Lab Units 05/08/25  0549 05/07/25  1521   SODIUM mmol/L 147* 143   POTASSIUM mmol/L 4.2 4.1   CHLORIDE mmol/L 110* 104   CO2 mmol/L 27.1 24.0   BUN mg/dL 14 11   CREATININE mg/dL 0.84 0.83   CALCIUM mg/dL 9.3 10.1   BILIRUBIN mg/dL  --  0.4   ALK PHOS U/L  --  54   ALT (SGPT) U/L  --  24   AST (SGOT) U/L  --  28   GLUCOSE mg/dL 124* 121*     Results from last 7 days   Lab Units 05/08/25  0549 05/07/25  1521   WBC 10*3/mm3 7.42 10.55   HEMOGLOBIN g/dL 11.3* 12.8   HEMATOCRIT % 35.0 38.5   PLATELETS 10*3/mm3 200 230         Results from last 7 days   Lab Units 05/08/25  0008 05/07/25  9990  05/07/25  1521   HSTROP T ng/L 172* 49* 30*                           Pertinent Radiology Results:    Imaging Results (All)       Procedure Component Value Units Date/Time    XR Chest 2 View [[id number]] Collected: 05/07/25 1620     Updated: 05/07/25 1622    Narrative:      PROCEDURE: XR CHEST 2 VW-     HISTORY: Chest pain; B34.9-Viral infection, unspecified     COMPARISON: None.     FINDINGS: The heart is normal in size. The lungs are clear. The  mediastinum is unremarkable. There is no pneumothorax.  There are no  acute osseous abnormalities. There is evidence of old calcified  granulomatous disease.       Impression:      No acute cardiopulmonary process.                 This report was signed and finalized on 5/7/2025 4:20 PM by Leticia Allen MD.       CT Head Without Contrast [[id number]] Collected: 05/07/25 1451     Updated: 05/07/25 1454    Narrative:      PROCEDURE: CT HEAD WO CONTRAST-     HISTORY: Headache, eval intracranial hemorrhage     COMPARISON: None.     TECHNIQUE: Multiple axial CT images were performed from the foramen  magnum to the vertex. Individualized dose reduction techniques using  automated exposure control or adjustment of mA and/or kV according to  the patient size were employed.     FINDINGS: There is mild, age-appropriate generalized cerebral atrophy.  The ventricles are enlarged. There is no evidence of edema or  hemorrhage.  No masses are identified. No extra-axial fluid is seen. The  paranasal sinuses are unremarkable.       Impression:      Atrophy  without acute process.              CTDI: 34.52 mGy  DLP:653.85 mGy.cm     This report was signed and finalized on 5/7/2025 2:52 PM by Leticia Allen MD.               Echo:      Condition on Discharge:      Stable.    Code status during the hospital stay:    Code Status and Medical Interventions: CPR (Attempt to Resuscitate); Full Support   Ordered at: 05/07/25 0706     Code Status (Patient has no pulse and is not breathing):    CPR  (Attempt to Resuscitate)     Medical Interventions (Patient has pulse or is breathing):    Full Support     Discharge Disposition:        Discharge Medications:       Discharge Medications        New Medications        Instructions Start Date   aspirin 81 MG EC tablet   81 mg, Oral, Daily      atorvastatin 80 MG tablet  Commonly known as: LIPITOR   80 mg, Oral, Nightly      cetirizine 10 MG tablet  Commonly known as: zyrTEC   10 mg, Oral, Daily      enoxaparin sodium 80 MG/0.8ML solution prefilled syringe syringe  Commonly known as: LOVENOX   1 mg/kg (80 mg), Subcutaneous, Every 12 Hours      fluticasone 50 MCG/ACT nasal spray  Commonly known as: FLONASE   2 sprays, Nasal, Daily      nitroglycerin 0.4 MG SL tablet  Commonly known as: NITROSTAT   0.4 mg, Sublingual, Every 5 Minutes PRN, Take no more than 3 doses in 15 minutes.             Continue These Medications        Instructions Start Date   amoxicillin-clavulanate 875-125 MG per tablet  Commonly known as: AUGMENTIN   1 tablet, Oral, 2 Times Daily      clotrimazole-betamethasone 1-0.05 % cream  Commonly known as: LOTRISONE   APPLY CREAM TOPICALLY TO AFFECTED AND SURROUNDING AREAS TWICE DAILY IN THE MORNING AND IN THE EVENING FOR 2 WEEKS      cyanocobalamin 50 MCG tablet  Commonly known as: VITAMIN B-12N   50 mcg, Daily      D-Care Glucometer w/Device kit   1 unit marking on U-100 syringe, Not Applicable, 3 Times Daily PRN      famotidine 40 MG tablet  Commonly known as: PEPCID   Take 1 tablet every day by oral route at bedtime for 90 days.      fluconazole 100 MG tablet  Commonly known as: Diflucan   100 mg, Oral, Every 72 Hours, 1 tablet now followed by 1 tablet in 72 hours if symptoms remain      gabapentin 100 MG capsule  Commonly known as: NEURONTIN   1 capsule, 3 Times Daily      glimepiride 4 MG tablet  Commonly known as: Amaryl   4 mg, Oral, 2 Times Daily      glucose blood test strip   Use as instructed      Lancets misc   Use as directed       levothyroxine 88 MCG tablet  Commonly known as: SYNTHROID, LEVOTHROID   1 tablet, Daily      lisinopril 5 MG tablet  Commonly known as: PRINIVIL,ZESTRIL   5 mg, Oral, Daily      metFORMIN 1000 MG tablet  Commonly known as: GLUCOPHAGE   Take 1 tablet by mouth twice daily      oxyCODONE 5 MG immediate release tablet  Commonly known as: ROXICODONE   5 mg, Oral, Every 6 Hours PRN      triamcinolone 0.1 % cream  Commonly known as: KENALOG   APPLY A THIN LAYER TO THE AFFECTED AREA(S) BY TOPICAL ROUTE 2 TIMES PER DAY      Vitamin D3 10 MCG (400 UNIT) capsule   1 tablet, Daily             Stop These Medications      lovastatin 40 MG tablet  Commonly known as: MEVACOR            Discharge Diet:     Diet Instructions       Advance Diet As Tolerated -Target Diet: NPO sips with meds      Target Diet: NPO sips with meds          Activity at Discharge:       Follow-up Appointments:    Additional Instructions for the Follow-ups that You Need to Schedule       Discharge Follow-up with PCP   As directed       Currently Documented PCP:    Ronaldo Gill DO    PCP Phone Number:    [phone]     Follow Up Details: 1 week        Discharge Follow-up with Specified Provider: [facility] Cardiology; 1 Month   As directed      To: [facility] Cardiology   Follow Up: 1 Month               Follow-up Information       Schedule an appointment as soon as possible for a visit  with Ronaldo Gill DO.    Specialties: Family Medicine, Hospitalist  Contact information:  [address] [place], [place] [address]  [phone]               Ronaldo Gill DO .    Specialties: Family Medicine, Hospitalist  Why: 1 week  Contact information:  [address] [place], [place] [address]  [phone]                           Future Appointments   Date Time Provider Department Center   6/18/2025  8:15 AM Ronaldo Gill DO [facility]   7/24/2025 10:10 AM Raj Shoemaker MD [facility]     Test Results Pending at  Discharge:           Davey Fernandez DO  05/08/25  10:32 EDT    Time: I spent 45 minutes on this discharge activity which included: face-to-face encounter with the patient, reviewing the data in the system, coordination of the care with the nursing staff as well as consultants, documentation, and entering orders.     Dictated utilizing Dragon dictation.        Electronically signed by Davey Fernandez DO at 05/08/25 9725

## 2025-05-12 ENCOUNTER — READMISSION MANAGEMENT (OUTPATIENT)
Dept: CALL CENTER | Facility: HOSPITAL | Age: 75
End: 2025-05-12
Payer: COMMERCIAL

## 2025-05-13 ENCOUNTER — TRANSITIONAL CARE MANAGEMENT TELEPHONE ENCOUNTER (OUTPATIENT)
Dept: CALL CENTER | Facility: HOSPITAL | Age: 75
End: 2025-05-13
Payer: COMMERCIAL

## 2025-05-13 NOTE — OUTREACH NOTE
Call Center TCM Note      Flowsheet Row Responses   [facility] patient discharged from? Non-BH  [[facility]]   Does the patient have one of the following disease processes/diagnoses(primary or secondary)? Acute MI (STEMI,NSTEMI)   TCM attempt successful? Yes   Call start time 0808   Call end time 0813   Discharge diagnosis NSTEMI   Is the patient taking all medications as directed (includes completed medication regime)? Yes   Comments Appt made for 5/16 @ 10:30 with Dr. Gill.  Pt states she will see her cardiologist in 2 weeks.   Does the patient have an appointment with their PCP within 7-14 days of discharge? No   Nursing Interventions Assisted patient with making appointment per protocol   Psychosocial issues? No   What is the patient's perception of their health status since discharge? New symptoms unrelated to diagnosis   Is the patient/caregiver able to teach back signs and symptoms related to disease process for when to call PCP? Yes   Is the patient/caregiver able to teach back signs and symptoms related to disease process for when to call 911? Yes   Is the patient/caregiver able to teach back the hierarchy of who to call/visit for symptoms/problems? PCP, Specialist, Home health nurse, Urgent Care, ED, 911 Yes   If the patient is a current smoker, are they able to teach back resources for cessation? Not a smoker   Additional teach back comments States she is doing well.  Denies any chest pains.  Cath site is healing well.  She will eat a healthy heart diet.  Has some hip pain but believes it is from laying in the bed for several days.  She will get up and walk more to see if this helps.  If not, she will discuss with Dr. Gill.  She is waiting to hear from Cardiac Rehab.   TCM call completed? Yes   Wrap up additional comments Appt made with PCP.  If hip pain doesn't improve, she will discuss with PCP at appt.   Call end time 0813            Paty DE LA ROSA - Licensed Nurse    5/13/2025, 08:17 EDT

## 2025-05-13 NOTE — OUTREACH NOTE
Prep Survey      Flowsheet Row Responses   [facility] facility patient discharged from? Non-BH   Is LACE score < 7 ? Non-BH Discharge   Eligibility [facility]   Hospital [facility]   Date of Admission 05/08/25   Date of Discharge 05/12/25   Discharge diagnosis NSTEMI   Does the patient have one of the following disease processes/diagnoses(primary or secondary)? Acute MI (STEMI,NSTEMI)   Prep survey completed? Yes            Elaina FLAHERTY - Registered Nurse

## 2025-05-16 ENCOUNTER — OFFICE VISIT (OUTPATIENT)
Age: 75
End: 2025-05-16
Payer: COMMERCIAL

## 2025-05-16 VITALS
SYSTOLIC BLOOD PRESSURE: 125 MMHG | WEIGHT: 167 LBS | HEIGHT: 63 IN | BODY MASS INDEX: 29.59 KG/M2 | DIASTOLIC BLOOD PRESSURE: 66 MMHG | HEART RATE: 86 BPM | TEMPERATURE: 97.2 F | OXYGEN SATURATION: 97 %

## 2025-05-16 DIAGNOSIS — I21.4 NSTEMI (NON-ST ELEVATED MYOCARDIAL INFARCTION): Primary | ICD-10-CM

## 2025-05-16 DIAGNOSIS — H61.21 IMPACTED CERUMEN OF RIGHT EAR: ICD-10-CM

## 2025-05-16 RX ORDER — METOPROLOL TARTRATE 25 MG/1
25 TABLET, FILM COATED ORAL 2 TIMES DAILY
COMMUNITY
Start: 2025-05-12 | End: 2025-06-11

## 2025-05-16 NOTE — PROGRESS NOTES
Follow Up Office Visit      Date: 2025   Patient Name: Reema Brothers  : 1950   MRN: [id number]     Chief Complaint:    Chief Complaint   Patient presents with    Hospital Follow Up Visit     Heart attack       History of Present Illness: Reema Brothers is a 75 y.o. female who is here today for hospital follow-up as well as right ear cerumen impaction.      Patient was admitted to the hospital for NSTEMI.  States that she did ultimately get a cardiac cath with stent placement.  Since then states that she been doing fairly well, no dyspnea noted.      Cerumen impaction-has continued to have cerumen impaction in her right ear and is concerned that she may have an ear infection.    Subjective      Review of Systems:   Review of Systems   Constitutional:  Negative for appetite change and unexpected weight loss.   HENT:  Negative for trouble swallowing.    Eyes:  Negative for blurred vision and double vision.   Respiratory:  Negative for cough and shortness of breath.    Cardiovascular:  Negative for chest pain and leg swelling.   Gastrointestinal:  Negative for blood in stool.   Endocrine: Negative for cold intolerance, heat intolerance and polyuria.   Musculoskeletal:  Negative for joint swelling.   Skin:  Negative for color change and bruise.   Neurological:  Negative for numbness and memory problem.   Hematological:  Does not bruise/bleed easily.   Psychiatric/Behavioral:  Negative for suicidal ideas and depressed mood. The patient is not nervous/anxious.        I have reviewed the patients family history, social history, past medical history, past surgical history and have updated it as appropriate.     Medications:     Current Outpatient Medications:     aspirin 81 MG EC tablet, Take 1 tablet by mouth Daily for 30 days., Disp: 30 tablet, Rfl: 0    atorvastatin (LIPITOR) 80 MG tablet, Take 1 tablet by mouth Every Night., Disp: 90 tablet, Rfl: 1    Blood Glucose Monitoring Suppl (D-CARE  GLUCOMETER) w/Device kit, 1 unit marking on U-100 syringe 3 (Three) Times a Day As Needed (as needed)., Disp: 1 kit, Rfl: 0    Cholecalciferol (VITAMIN D3) 400 UNITS capsule, Take 1 tablet by mouth daily., Disp: , Rfl:     clotrimazole-betamethasone (LOTRISONE) 1-0.05 % cream, APPLY CREAM TOPICALLY TO AFFECTED AND SURROUNDING AREAS TWICE DAILY IN THE MORNING AND IN THE EVENING FOR 2 WEEKS, Disp: , Rfl:     cyanocobalamin (VITAMIN B-12N) 50 MCG tablet, Take 1 tablet by mouth Daily., Disp: , Rfl:     famotidine (PEPCID) 40 MG tablet, Take 1 tablet every day by oral route at bedtime for 90 days., Disp: , Rfl:     gabapentin (NEURONTIN) 100 MG capsule, Take 1 capsule by mouth 3 (Three) Times a Day., Disp: , Rfl:     glimepiride (Amaryl) 4 MG tablet, Take 1 tablet by mouth 2 (Two) Times a Day., Disp: 180 tablet, Rfl: 3    glucose blood test strip, Use as instructed, Disp: 100 each, Rfl: 12    Lancets misc, Use as directed, Disp: 100 each, Rfl: 12    levothyroxine (SYNTHROID, LEVOTHROID) 88 MCG tablet, Take 1 tablet by mouth Daily., Disp: , Rfl:     lisinopril (PRINIVIL,ZESTRIL) 5 MG tablet, Take 1 tablet by mouth Daily., Disp: 90 tablet, Rfl: 3    metFORMIN (GLUCOPHAGE) 1000 MG tablet, Take 1 tablet by mouth twice daily, Disp: 180 tablet, Rfl: 2    metoprolol tartrate (LOPRESSOR) 25 MG tablet, Take 1 tablet by mouth 2 (Two) Times a Day., Disp: , Rfl:     ticagrelor (BRILINTA) 90 MG tablet tablet, Take 1 tablet by mouth 2 (Two) Times a Day., Disp: , Rfl:     triamcinolone (KENALOG) 0.1 % cream, APPLY A THIN LAYER TO THE AFFECTED AREA(S) BY TOPICAL ROUTE 2 TIMES PER DAY, Disp: , Rfl:     fluconazole (Diflucan) 100 MG tablet, Take 1 tablet by mouth Every 72 (Seventy-Two) Hours. 1 tablet now followed by 1 tablet in 72 hours if symptoms remain (Patient not taking: Reported on 5/16/2025), Disp: 2 tablet, Rfl: 0    fluticasone (FLONASE) 50 MCG/ACT nasal spray, Administer 2 sprays into the nostril(s) as directed by provider  "Daily. (Patient not taking: Reported on 5/16/2025), Disp: 11.1 g, Rfl: 0    nitroglycerin (NITROSTAT) 0.4 MG SL tablet, Place 1 tablet under the tongue Every 5 (Five) Minutes As Needed for Chest Pain. Take no more than 3 doses in 15 minutes. (Patient not taking: Reported on 5/16/2025), Disp: 25 tablet, Rfl: 12    oxyCODONE (ROXICODONE) 5 MG immediate release tablet, Take 1 tablet by mouth Every 6 (Six) Hours As Needed for Severe Pain. (Patient not taking: Reported on 5/16/2025), Disp: 12 tablet, Rfl: 0    sodium chloride 0.65 % nasal spray, Administer 2 sprays into the nostril(s) as directed by provider Every 30 (Thirty) Minutes As Needed for Congestion. (Patient not taking: Reported on 5/16/2025), Disp: , Rfl:     Allergies:   Allergies   Allergen Reactions    Codeine Anaphylaxis    Nubain [Nalbuphine] Other (See Comments)     Excessive sedation       Objective     Physical Exam: Please see above  Vital Signs:   Vitals:    05/16/25 1019   BP: 125/66   Pulse: 86   Temp: 97.2 °F (36.2 °C)   SpO2: 97%   Weight: 75.8 kg (167 lb)   Height: 161 cm (63.39\")     Body mass index is 29.22 kg/m².    Physical Exam  Vitals and nursing note reviewed.   Constitutional:       Appearance: Normal appearance.   HENT:      Head: Normocephalic and atraumatic.      Right Ear: There is impacted cerumen.   Eyes:      General: Lids are normal.      Conjunctiva/sclera: Conjunctivae normal.   Cardiovascular:      Rate and Rhythm: Normal rate and regular rhythm.   Pulmonary:      Effort: Pulmonary effort is normal.      Breath sounds: Normal breath sounds and air entry.   Abdominal:      General: Abdomen is flat. Bowel sounds are normal.      Palpations: Abdomen is soft.   Musculoskeletal:      Cervical back: Full passive range of motion without pain and normal range of motion.   Neurological:      General: No focal deficit present.      Mental Status: She is alert and oriented to person, place, and time.   Psychiatric:         Attention and " Perception: Attention normal.         Mood and Affect: Mood normal.         Behavior: Behavior normal. Behavior is cooperative.         Procedures    Results:   Labs:   Hemoglobin A1C   Date Value Ref Range Status   05/08/2025 6.80 (H) 4.80 - 5.60 % Final     TSH   Date Value Ref Range Status   11/15/2021 1.030 0.270 - 4.200 uIU/mL Final   07/30/2021 8.510 (H) 0.270 - 4.200 uIU/mL Final        POCT Results (if applicable):   Results for orders placed or performed during the hospital encounter of 05/07/25   COVID-19 and FLU A/B PCR, 1 HR TAT - Swab, Nasopharynx    Collection Time: 05/07/25  1:58 PM    Specimen: Nasopharynx; Swab   Result Value Ref Range    COVID19 Not Detected Not Detected - Ref. Range    Influenza A PCR Not Detected Not Detected    Influenza B PCR Not Detected Not Detected   Comprehensive Metabolic Panel    Collection Time: 05/07/25  3:21 PM    Specimen: Blood   Result Value Ref Range    Glucose 121 (H) 65 - 99 mg/dL    BUN 11 8 - 23 mg/dL    Creatinine 0.83 0.57 - 1.00 mg/dL    Sodium 143 136 - 145 mmol/L    Potassium 4.1 3.5 - 5.2 mmol/L    Chloride 104 98 - 107 mmol/L    CO2 24.0 22.0 - 29.0 mmol/L    Calcium 10.1 8.6 - 10.5 mg/dL    Total Protein 7.2 6.0 - 8.5 g/dL    Albumin 4.4 3.5 - 5.2 g/dL    ALT (SGPT) 24 1 - 33 U/L    AST (SGOT) 28 1 - 32 U/L    Alkaline Phosphatase 54 39 - 117 U/L    Total Bilirubin 0.4 0.0 - 1.2 mg/dL    Globulin 2.8 gm/dL    A/G Ratio 1.6 g/dL    BUN/Creatinine Ratio 13.3 7.0 - 25.0    Anion Gap 15.0 5.0 - 15.0 mmol/L    eGFR 74.1 >60.0 mL/min/1.73   High Sensitivity Troponin T    Collection Time: 05/07/25  3:21 PM    Specimen: Blood   Result Value Ref Range    HS Troponin T 30 (H) <14 ng/L   C-reactive Protein    Collection Time: 05/07/25  3:21 PM    Specimen: Blood   Result Value Ref Range    C-Reactive Protein <0.30 0.00 - 0.50 mg/dL   Procalcitonin    Collection Time: 05/07/25  3:21 PM    Specimen: Blood   Result Value Ref Range    Procalcitonin 0.04 0.00 - 0.25  ng/mL   Magnesium    Collection Time: 05/07/25  3:21 PM    Specimen: Blood   Result Value Ref Range    Magnesium 1.6 1.6 - 2.4 mg/dL   D-dimer, Quantitative    Collection Time: 05/07/25  3:21 PM    Specimen: Blood   Result Value Ref Range    D-Dimer, Quantitative 0.45 0.00 - 0.74 MCGFEU/mL   CBC Auto Differential    Collection Time: 05/07/25  3:21 PM    Specimen: Blood   Result Value Ref Range    WBC 10.55 3.40 - 10.80 10*3/mm3    RBC 4.04 3.77 - 5.28 10*6/mm3    Hemoglobin 12.8 12.0 - 15.9 g/dL    Hematocrit 38.5 34.0 - 46.6 %    MCV 95.3 79.0 - 97.0 fL    MCH 31.7 26.6 - 33.0 pg    MCHC 33.2 31.5 - 35.7 g/dL    RDW 12.6 12.3 - 15.4 %    RDW-SD 44.7 37.0 - 54.0 fl    MPV 10.2 6.0 - 12.0 fL    Platelets 230 140 - 450 10*3/mm3    Neutrophil % 74.1 42.7 - 76.0 %    Lymphocyte % 18.8 (L) 19.6 - 45.3 %    Monocyte % 5.7 5.0 - 12.0 %    Eosinophil % 0.5 0.3 - 6.2 %    Basophil % 0.5 0.0 - 1.5 %    Immature Grans % 0.4 0.0 - 0.5 %    Neutrophils, Absolute 7.83 (H) 1.70 - 7.00 10*3/mm3    Lymphocytes, Absolute 1.98 0.70 - 3.10 10*3/mm3    Monocytes, Absolute 0.60 0.10 - 0.90 10*3/mm3    Eosinophils, Absolute 0.05 0.00 - 0.40 10*3/mm3    Basophils, Absolute 0.05 0.00 - 0.20 10*3/mm3    Immature Grans, Absolute 0.04 0.00 - 0.05 10*3/mm3    nRBC 0.0 0.0 - 0.2 /100 WBC   High Sensitivity Troponin T 1Hr    Collection Time: 05/07/25  4:34 PM    Specimen: Blood   Result Value Ref Range    HS Troponin T 49 (H) <14 ng/L    Troponin T Numeric Delta 19 (C) Abnormal if >/=3 ng/L    Troponin T % Delta 63 (C) Abnormal if >/= 20%   High Sensitivity Troponin T    Collection Time: 05/08/25 12:08 AM    Specimen: Blood   Result Value Ref Range    HS Troponin T 172 (C) <14 ng/L   Basic Metabolic Panel    Collection Time: 05/08/25  5:49 AM    Specimen: Blood   Result Value Ref Range    Glucose 124 (H) 65 - 99 mg/dL    BUN 14 8 - 23 mg/dL    Creatinine 0.84 0.57 - 1.00 mg/dL    Sodium 147 (H) 136 - 145 mmol/L    Potassium 4.2 3.5 - 5.2 mmol/L     Chloride 110 (H) 98 - 107 mmol/L    CO2 27.1 22.0 - 29.0 mmol/L    Calcium 9.3 8.6 - 10.5 mg/dL    BUN/Creatinine Ratio 16.7 7.0 - 25.0    Anion Gap 9.9 5.0 - 15.0 mmol/L    eGFR 73.0 >60.0 mL/min/1.73   CBC Auto Differential    Collection Time: 05/08/25  5:49 AM    Specimen: Blood   Result Value Ref Range    WBC 7.42 3.40 - 10.80 10*3/mm3    RBC 3.61 (L) 3.77 - 5.28 10*6/mm3    Hemoglobin 11.3 (L) 12.0 - 15.9 g/dL    Hematocrit 35.0 34.0 - 46.6 %    MCV 97.0 79.0 - 97.0 fL    MCH 31.3 26.6 - 33.0 pg    MCHC 32.3 31.5 - 35.7 g/dL    RDW 12.7 12.3 - 15.4 %    RDW-SD 44.8 37.0 - 54.0 fl    MPV 10.6 6.0 - 12.0 fL    Platelets 200 140 - 450 10*3/mm3    Neutrophil % 51.6 42.7 - 76.0 %    Lymphocyte % 38.0 19.6 - 45.3 %    Monocyte % 8.2 5.0 - 12.0 %    Eosinophil % 1.2 0.3 - 6.2 %    Basophil % 0.7 0.0 - 1.5 %    Immature Grans % 0.3 0.0 - 0.5 %    Neutrophils, Absolute 3.83 1.70 - 7.00 10*3/mm3    Lymphocytes, Absolute 2.82 0.70 - 3.10 10*3/mm3    Monocytes, Absolute 0.61 0.10 - 0.90 10*3/mm3    Eosinophils, Absolute 0.09 0.00 - 0.40 10*3/mm3    Basophils, Absolute 0.05 0.00 - 0.20 10*3/mm3    Immature Grans, Absolute 0.02 0.00 - 0.05 10*3/mm3    nRBC 0.0 0.0 - 0.2 /100 WBC   Hemoglobin A1c    Collection Time: 05/08/25  5:49 AM    Specimen: Blood   Result Value Ref Range    Hemoglobin A1C 6.80 (H) 4.80 - 5.60 %   Lipid Panel    Collection Time: 05/08/25  5:49 AM    Specimen: Blood   Result Value Ref Range    Total Cholesterol 111 0 - 200 mg/dL    Triglycerides 107 0 - 150 mg/dL    HDL Cholesterol 39 (L) 40 - 60 mg/dL    LDL Cholesterol  52 0 - 100 mg/dL    VLDL Cholesterol 20 5 - 40 mg/dL    LDL/HDL Ratio 1.30    Adult Transthoracic Echo Complete W/ Cont if Necessary Per Protocol    Collection Time: 05/08/25 10:05 AM   Result Value Ref Range    EF_3D-VOL 63.0 %    LVIDd 3.3 cm    LVIDs 2.5 cm    IVSd 1.11 cm    LVPWd 1.03 cm    FS 23.2 %    IVS/LVPW 1.08 cm    ESV(cubed) 15.8 ml    LV Sys Vol (BSA corrected) 16.0 cm2     EDV(cubed) 35.0 ml    LV Ernst Vol (BSA corrected) 42.7 cm2    LV mass(C)d 103.2 grams    LVOT area 3.0 cm2    LVOT diam 1.94 cm    EDV(MOD-sp2) 61.6 ml    EDV(MOD-sp4) 76.8 ml    ESV(MOD-sp2) 44.3 ml    ESV(MOD-sp4) 28.8 ml    SV(MOD-sp2) 17.3 ml    SV(MOD-sp4) 48.0 ml    SVi(MOD-SP2) 9.6 ml/m2    SVi(MOD-SP4) 26.7 ml/m2    SVi (LVOT) 30.4 ml/m2    EF(MOD-sp2) 28.1 %    EF(MOD-sp4) 62.5 %    MV E max arian 101.0 cm/sec    MV A max arian 114.0 cm/sec    MV dec time 0.18 sec    MV E/A 0.89     LA ESV Index (BP) 18.7 ml/m2    Med Peak E' Arian 9.0 cm/sec    Lat Peak E' Arian 9.4 cm/sec    Avg E/e' ratio 10.98     SV(LVOT) 54.7 ml    RV Base 3.4 cm    RV Mid 3.3 cm    RV Length 8.0 cm    TAPSE (>1.6) 2.24 cm    RV S' 14.7 cm/sec    LA dimension (2D)  3.7 cm    LV V1 max 90.2 cm/sec    LV V1 max PG 3.3 mmHg    LV V1 mean PG 2.00 mmHg    LV V1 VTI 18.5 cm    Ao pk arian 129.0 cm/sec    Ao max PG 6.7 mmHg    Ao mean PG 4.0 mmHg    Ao V2 VTI 27.3 cm    TYSON(I,D) 2.00 cm2    Dimensionless Index 0.68 (DI)    MV max PG 5.7 mmHg    MV mean PG 4.0 mmHg    MV V2 VTI 34.9 cm    MVA(VTI) 1.57 cm2    MV dec slope 560.0 cm/sec2    RV V1 max PG 2.30 mmHg    RV V1 max 75.8 cm/sec    RV V1 VTI 19.0 cm    PA V2 max 137.0 cm/sec    PA acc time 0.20 sec    Ao root diam 3.0 cm       Imaging:   No valid procedures specified.       Assessment / Plan      Assessment/Plan:   Diagnoses and all orders for this visit:    1. NSTEMI (non-ST elevated myocardial infarction) (Primary)   - Seems to be recovering well from cardiac cath.  Patient will be following up with cardiology in 2 weeks.    2. Impacted cerumen of right ear   - Ear wax was disimpacted with no issues.               Vaccine Counseling:      Follow Up:   No follow-ups on file.        Ronaldo Gill,   [facility]

## 2025-05-22 ENCOUNTER — TREATMENT (OUTPATIENT)
Dept: CARDIAC REHAB | Facility: HOSPITAL | Age: 75
End: 2025-05-22
Payer: COMMERCIAL

## 2025-05-22 DIAGNOSIS — Z95.5 STATUS POST CORONARY ARTERY STENT PLACEMENT: ICD-10-CM

## 2025-05-22 DIAGNOSIS — I21.4 NSTEMI (NON-ST ELEVATED MYOCARDIAL INFARCTION): Primary | ICD-10-CM

## 2025-05-22 PROCEDURE — 93798 PHYS/QHP OP CAR RHAB W/ECG: CPT

## 2025-05-22 NOTE — PROGRESS NOTES
Pt was seen today in [facility] for a Phase II visit. Vital signs and session notes recorded in [employer] and will be scanned into Epic by HIM.

## 2025-05-30 ENCOUNTER — TREATMENT (OUTPATIENT)
Dept: CARDIAC REHAB | Facility: HOSPITAL | Age: 75
End: 2025-05-30
Payer: COMMERCIAL

## 2025-05-30 DIAGNOSIS — I21.4 NSTEMI (NON-ST ELEVATED MYOCARDIAL INFARCTION): Primary | ICD-10-CM

## 2025-05-30 PROCEDURE — 93798 PHYS/QHP OP CAR RHAB W/ECG: CPT

## 2025-05-30 NOTE — PROGRESS NOTES
Pt was seen today in [facility] for a Phase II visit. Vital signs and session notes recorded in [employer] and will be scanned into Epic by HIM.

## 2025-06-02 ENCOUNTER — TREATMENT (OUTPATIENT)
Dept: CARDIAC REHAB | Facility: HOSPITAL | Age: 75
End: 2025-06-02
Payer: COMMERCIAL

## 2025-06-02 DIAGNOSIS — I21.4 NSTEMI (NON-ST ELEVATED MYOCARDIAL INFARCTION): Primary | ICD-10-CM

## 2025-06-02 PROCEDURE — 93798 PHYS/QHP OP CAR RHAB W/ECG: CPT

## 2025-06-02 NOTE — PROGRESS NOTES
Pt was seen today in [facility] for a Phase II visit. Vital signs and session notes recorded in [employer] and will be scanned into Epic by HIM.

## 2025-06-06 ENCOUNTER — TREATMENT (OUTPATIENT)
Dept: CARDIAC REHAB | Facility: HOSPITAL | Age: 75
End: 2025-06-06
Payer: COMMERCIAL

## 2025-06-06 DIAGNOSIS — I21.4 NSTEMI (NON-ST ELEVATED MYOCARDIAL INFARCTION): Primary | ICD-10-CM

## 2025-06-06 PROCEDURE — 93798 PHYS/QHP OP CAR RHAB W/ECG: CPT

## 2025-06-06 NOTE — PROGRESS NOTES
Pt was seen today in CR for a Phase II visit. Vital signs and session notes recorded in [employer] and will be scanned into Epic by HIM.

## 2025-06-09 ENCOUNTER — TREATMENT (OUTPATIENT)
Dept: CARDIAC REHAB | Facility: HOSPITAL | Age: 75
End: 2025-06-09
Payer: COMMERCIAL

## 2025-06-09 DIAGNOSIS — I21.4 NSTEMI (NON-ST ELEVATED MYOCARDIAL INFARCTION): Primary | ICD-10-CM

## 2025-06-09 PROCEDURE — 93798 PHYS/QHP OP CAR RHAB W/ECG: CPT

## 2025-06-09 NOTE — PROGRESS NOTES
Pt was seen today in [facility] for a Phase II visit. Vital signs and session notes recorded in [employer] and will be scanned into Epic by HIM.

## 2025-06-13 ENCOUNTER — APPOINTMENT (OUTPATIENT)
Dept: CARDIAC REHAB | Facility: HOSPITAL | Age: 75
End: 2025-06-13
Payer: COMMERCIAL

## 2025-06-18 ENCOUNTER — OFFICE VISIT (OUTPATIENT)
Age: 75
End: 2025-06-18
Payer: COMMERCIAL

## 2025-06-18 VITALS
HEART RATE: 82 BPM | DIASTOLIC BLOOD PRESSURE: 65 MMHG | TEMPERATURE: 98.1 F | OXYGEN SATURATION: 96 % | HEIGHT: 63 IN | WEIGHT: 169 LBS | BODY MASS INDEX: 29.95 KG/M2 | SYSTOLIC BLOOD PRESSURE: 148 MMHG

## 2025-06-18 DIAGNOSIS — J32.9 SINUSITIS, UNSPECIFIED CHRONICITY, UNSPECIFIED LOCATION: Primary | ICD-10-CM

## 2025-06-18 DIAGNOSIS — Z12.11 COLON CANCER SCREENING: ICD-10-CM

## 2025-06-18 DIAGNOSIS — Z00.01 ENCOUNTER FOR GENERAL ADULT MEDICAL EXAMINATION WITH ABNORMAL FINDINGS: ICD-10-CM

## 2025-06-18 PROCEDURE — 99213 OFFICE O/P EST LOW 20 MIN: CPT | Performed by: FAMILY MEDICINE

## 2025-06-18 PROCEDURE — 99397 PER PM REEVAL EST PAT 65+ YR: CPT | Performed by: FAMILY MEDICINE

## 2025-06-18 RX ORDER — ASPIRIN 81 MG/1
81 TABLET ORAL DAILY
COMMUNITY
Start: 2025-06-10 | End: 2026-06-10

## 2025-06-18 RX ORDER — AMOXICILLIN 500 MG/1
1000 CAPSULE ORAL 2 TIMES DAILY
Qty: 28 CAPSULE | Refills: 0 | Status: SHIPPED | OUTPATIENT
Start: 2025-06-18 | End: 2025-06-25

## 2025-06-18 RX ORDER — PANTOPRAZOLE SODIUM 40 MG/1
40 TABLET, DELAYED RELEASE ORAL DAILY
COMMUNITY
Start: 2025-06-10 | End: 2026-06-10

## 2025-06-18 RX ORDER — TICAGRELOR 90 MG/1
90 TABLET, FILM COATED ORAL 2 TIMES DAILY
COMMUNITY

## 2025-06-18 NOTE — PROGRESS NOTES
Female Physical Note      Date: 2025   Patient Name: Reema Brothers  : 1950   MRN: [id number]     Chief Complaint:    Chief Complaint   Patient presents with    Annual Exam     Physical          History of Present Illness: Reema Brothers is a 75 y.o. female who is here today for their annual health maintenance and physical.  States that since her NSTEMI she has not had any chest pain or concerns.  Does state that she has been under some stress due to her  also being sick.  HTN- states that her BP usually runs good at home.    Congestion-states that she has been having some increasing congestion and drainage over the last 4 or so days.  Does state that she has been staying in the hospital as her  was admitted to the hospital so she has been staying with him.      Subjective      Review of Systems:   Review of Systems   Constitutional:  Negative for appetite change and unexpected weight loss.   HENT:  Negative for trouble swallowing.    Eyes:  Negative for blurred vision and double vision.   Respiratory:  Negative for cough and shortness of breath.    Cardiovascular:  Negative for chest pain and leg swelling.   Gastrointestinal:  Negative for blood in stool.   Endocrine: Negative for cold intolerance, heat intolerance and polyuria.   Musculoskeletal:  Negative for joint swelling.   Skin:  Negative for color change and bruise.   Neurological:  Negative for numbness and memory problem.   Hematological:  Does not bruise/bleed easily.   Psychiatric/Behavioral:  Negative for suicidal ideas and depressed mood. The patient is not nervous/anxious.        Past Medical History, Social History, Family History and Care Team were all reviewed with patient and updated as appropriate.     Medications:     Current Outpatient Medications:     aspirin 81 MG EC tablet, Take 1 tablet by mouth Daily., Disp: , Rfl:     atorvastatin (LIPITOR) 80 MG tablet, Take 1 tablet by mouth Every Night., Disp:  90 tablet, Rfl: 1    Blood Glucose Monitoring Suppl (D-CARE GLUCOMETER) w/Device kit, 1 unit marking on U-100 syringe 3 (Three) Times a Day As Needed (as needed)., Disp: 1 kit, Rfl: 0    Cholecalciferol (VITAMIN D3) 400 UNITS capsule, Take 1 tablet by mouth daily., Disp: , Rfl:     clotrimazole-betamethasone (LOTRISONE) 1-0.05 % cream, APPLY CREAM TOPICALLY TO AFFECTED AND SURROUNDING AREAS TWICE DAILY IN THE MORNING AND IN THE EVENING FOR 2 WEEKS, Disp: , Rfl:     cyanocobalamin (VITAMIN B-12N) 50 MCG tablet, Take 1 tablet by mouth Daily., Disp: , Rfl:     famotidine (PEPCID) 40 MG tablet, Take 1 tablet every day by oral route at bedtime for 90 days., Disp: , Rfl:     gabapentin (NEURONTIN) 100 MG capsule, Take 1 capsule by mouth 3 (Three) Times a Day., Disp: , Rfl:     glimepiride (Amaryl) 4 MG tablet, Take 1 tablet by mouth 2 (Two) Times a Day., Disp: 180 tablet, Rfl: 3    glucose blood test strip, Use as instructed, Disp: 100 each, Rfl: 12    Lancets misc, Use as directed, Disp: 100 each, Rfl: 12    levothyroxine (SYNTHROID, LEVOTHROID) 88 MCG tablet, Take 1 tablet by mouth Daily., Disp: , Rfl:     lisinopril (PRINIVIL,ZESTRIL) 5 MG tablet, Take 1 tablet by mouth Daily., Disp: 90 tablet, Rfl: 3    metFORMIN (GLUCOPHAGE) 1000 MG tablet, Take 1 tablet by mouth twice daily, Disp: 180 tablet, Rfl: 2    pantoprazole (PROTONIX) 40 MG EC tablet, Take 1 tablet by mouth Daily., Disp: , Rfl:     ticagrelor (BRILINTA) 90 MG tablet tablet, Take 1 tablet by mouth 2 (Two) Times a Day., Disp: , Rfl:     triamcinolone (KENALOG) 0.1 % cream, APPLY A THIN LAYER TO THE AFFECTED AREA(S) BY TOPICAL ROUTE 2 TIMES PER DAY, Disp: , Rfl:     amoxicillin (AMOXIL) 500 MG capsule, Take 2 capsules by mouth 2 (Two) Times a Day for 7 days., Disp: 28 capsule, Rfl: 0    fluconazole (Diflucan) 100 MG tablet, Take 1 tablet by mouth Every 72 (Seventy-Two) Hours. 1 tablet now followed by 1 tablet in 72 hours if symptoms remain (Patient not taking:  Reported on 6/18/2025), Disp: 2 tablet, Rfl: 0    fluticasone (FLONASE) 50 MCG/ACT nasal spray, Administer 2 sprays into the nostril(s) as directed by provider Daily. (Patient not taking: Reported on 6/18/2025), Disp: 11.1 g, Rfl: 0    metoprolol tartrate (LOPRESSOR) 25 MG tablet, Take 1 tablet by mouth 2 (Two) Times a Day., Disp: , Rfl:     nitroglycerin (NITROSTAT) 0.4 MG SL tablet, Place 1 tablet under the tongue Every 5 (Five) Minutes As Needed for Chest Pain. Take no more than 3 doses in 15 minutes. (Patient not taking: Reported on 6/18/2025), Disp: 25 tablet, Rfl: 12    oxyCODONE (ROXICODONE) 5 MG immediate release tablet, Take 1 tablet by mouth Every 6 (Six) Hours As Needed for Severe Pain. (Patient not taking: Reported on 6/18/2025), Disp: 12 tablet, Rfl: 0    sodium chloride 0.65 % nasal spray, Administer 2 sprays into the nostril(s) as directed by provider Every 30 (Thirty) Minutes As Needed for Congestion. (Patient not taking: Reported on 6/18/2025), Disp: , Rfl:     Allergies:   Allergies   Allergen Reactions    Codeine Anaphylaxis    Nubain [Nalbuphine] Other (See Comments)     Excessive sedation       Immunizations:  Health Maintenance Summary            Current Care Gaps       ANNUAL PHYSICAL (Yearly) Overdue since 7/11/2020 07/11/2019  Done              DIABETIC FOOT EXAM (Yearly) Overdue since 7/11/2020 07/11/2019  Done    08/16/2016  Done              DIABETIC EYE EXAM (Yearly) Never done     No completion, postpone, or frequency change history exists for this topic.              LUNG CANCER SCREENING (Yearly) Never done     No completion, postpone, or frequency change history exists for this topic.                      Awaiting Completion       COLORECTAL CANCER SCREENING (COLOGUARD - Every 3 Years) Order placed this encounter      06/18/2025  Order placed for Cologuard - Stool, Per Rectum by Ronaldo Gill DO    04/01/2022  Cologuard component of Cologuard - ,    01/30/2019   Cologuard - Stool, Per Rectum    01/15/2019  Cologuard - ,                      Upcoming       DXA SCAN (Every 2 Years) Postponed until 6/18/2025 06/18/2025  Postponed until 6/18/2025 by Ronaldo Gill DO (Patient Refused)    07/16/2019  DEXA Bone Density Axial    08/24/2016  SCANNED - DEXA    11/08/2012   Dexa Scan component of  DEXA SCAN              TDAP/TD VACCINES (1 - Tdap) Postponed until 10/6/2025      04/09/2025  Postponed until 10/6/2025 by Darren Pierre MA (Patient Refused)    02/23/2017  Done    02/23/2017  Imm Admin: Td, Not Adsorbed              COVID-19 Vaccine (1 - 2024-25 season) Postponed until 3/18/2026      03/18/2025  Postponed until 3/18/2026 by Yuli Henderson MA (Product Unavailable)              ZOSTER VACCINE (2 of 2) Postponed until 3/18/2026      03/18/2025  Postponed until 3/18/2026 by Yuli Henderson MA (Product Unavailable)    01/08/2015  Done              INFLUENZA VACCINE (Yearly - July to March) Next due on 7/1/2025 11/20/2024  Imm Admin: Fluzone High-Dose 65+YRS    11/09/2023  Imm Admin: Fluzone High-Dose 65+yrs    10/10/2022  Imm Admin: Fluad Quad 65+    08/28/2020  Imm Admin: Fluad Quad 65+    10/15/2019  Imm Admin: FLUAD TRI 65YR+     Only the first 5 history entries have been loaded, but more history exists.            MAMMOGRAM (Every 2 Years) Next due on 10/26/2025      10/26/2023  Mammo Screening Digital Tomosynthesis Bilateral With CAD    05/16/2022  Mammo Screening Digital Tomosynthesis Bilateral With CAD    10/30/2020  Mammo Screening Digital Tomosynthesis Bilateral With CAD    10/17/2019  Mammo Screening Digital Tomosynthesis Bilateral With CAD    10/17/2018  Mammo diagnostic digital tomosynthesis left w CAD      Only the first 5 history entries have been loaded, but more history exists.              HEMOGLOBIN A1C (Every 6 Months) Next due on 11/8/2025 05/08/2025  Hemoglobin A1C component of Hemoglobin A1c    03/18/2025  Hemoglobin A1C  "component of POC Glycosylated Hemoglobin (Hb A1C)    08/27/2024  Hemoglobin A1C component of Hemoglobin A1c    11/15/2021  Hemoglobin A1C component of Hemoglobin A1c    01/05/2021  Hemoglobin A1C component of Hemoglobin A1c      Only the first 5 history entries have been loaded, but more history exists.              URINE MICROALBUMIN-CREATININE RATIO (uACR) (Yearly) Next due on 3/18/2026      03/18/2025  POC Albumin/Creatinine Ratio Urine                      Completed or No Longer Recommended       HEPATITIS C SCREENING  Completed      02/14/2017  Hepatitis C Antibody              Pneumococcal Vaccine 50+ (Series Information) Completed      10/10/2022  Imm Admin: Pneumococcal Conjugate 20-Valent (PCV20)    12/08/2020  Imm Admin: Pneumococcal Polysaccharide (PPSV23)    08/12/2016  Imm Admin: Pneumococcal Conjugate 13-Valent (PCV13)    02/18/2014  Imm Admin: Pneumococcal Polysaccharide (PPSV23)                             No orders of the defined types were placed in this encounter.       Colorectal Screening:   ordered   Last Completed Colonoscopy    This patient has no relevant Health Maintenance data.       Pap:  up to date   Last Completed Pap Smear    This patient has no relevant Health Maintenance data.        Mammogram:  ordered   Last Completed Mammogram            Upcoming       MAMMOGRAM (Every 2 Years) Next due on 10/26/2025      10/26/2023  Mammo Screening Digital Tomosynthesis Bilateral With CAD    05/16/2022  Mammo Screening Digital Tomosynthesis Bilateral With CAD    10/30/2020  Mammo Screening Digital Tomosynthesis Bilateral With CAD    10/17/2019  Mammo Screening Digital Tomosynthesis Bilateral With CAD    10/17/2018  Mammo diagnostic digital tomosynthesis left w CAD      Only the first 5 history entries have been loaded, but more history exists.                               HIV (Age 15-65 once): No results found for: \"HIV1X2\"  A1c:   Hemoglobin A1C   Date Value Ref Range Status   05/08/2025 6.80 " "(H) 4.80 - 5.60 % Final      Lipid panel:  No results found for: \"LIPIDEXCLUSI\"    The ASCVD Risk score (Eduin DK, et al., 2019) failed to calculate for the following reasons:    Risk score cannot be calculated because patient has a medical history suggesting prior/existing ASCVD      Ophthalmologist: up to date  Dentist: up to date    Tobacco Use: Medium Risk (6/18/2025)    Patient History     Smoking Tobacco Use: Former     Smokeless Tobacco Use: Never     Passive Exposure: Past       Social History     Substance and Sexual Activity   Alcohol Use No        Social History     Substance and Sexual Activity   Drug Use No            Depression: PHQ-2 Depression Screening  PHQ-9 Total Score:               Objective     Physical Exam:  Vital Signs:   Vitals:    06/18/25 0813   BP: 148/65   Pulse: 82   Temp: 98.1 °F (36.7 °C)   SpO2: 96%   Weight: 76.7 kg (169 lb)   Height: 161 cm (63.39\")     Body mass index is 29.57 kg/m².     Physical Exam  Vitals and nursing note reviewed.   Constitutional:       Appearance: Normal appearance.   HENT:      Head: Normocephalic and atraumatic.   Eyes:      General: Lids are normal.      Conjunctiva/sclera: Conjunctivae normal.   Cardiovascular:      Rate and Rhythm: Normal rate and regular rhythm.   Pulmonary:      Effort: Pulmonary effort is normal.      Breath sounds: Normal breath sounds and air entry.   Abdominal:      General: Abdomen is flat. Bowel sounds are normal.      Palpations: Abdomen is soft.   Musculoskeletal:      Cervical back: Full passive range of motion without pain and normal range of motion.   Neurological:      General: No focal deficit present.      Mental Status: She is alert and oriented to person, place, and time.   Psychiatric:         Attention and Perception: Attention normal.         Mood and Affect: Mood normal.         Behavior: Behavior normal. Behavior is cooperative.         POCT Results (if applicable);   Results for orders placed or performed " during the hospital encounter of 05/07/25   COVID-19 and FLU A/B PCR, 1 HR TAT - Swab, Nasopharynx    Collection Time: 05/07/25  1:58 PM    Specimen: Nasopharynx; Swab   Result Value Ref Range    COVID19 Not Detected Not Detected - Ref. Range    Influenza A PCR Not Detected Not Detected    Influenza B PCR Not Detected Not Detected   Comprehensive Metabolic Panel    Collection Time: 05/07/25  3:21 PM    Specimen: Blood   Result Value Ref Range    Glucose 121 (H) 65 - 99 mg/dL    BUN 11 8 - 23 mg/dL    Creatinine 0.83 0.57 - 1.00 mg/dL    Sodium 143 136 - 145 mmol/L    Potassium 4.1 3.5 - 5.2 mmol/L    Chloride 104 98 - 107 mmol/L    CO2 24.0 22.0 - 29.0 mmol/L    Calcium 10.1 8.6 - 10.5 mg/dL    Total Protein 7.2 6.0 - 8.5 g/dL    Albumin 4.4 3.5 - 5.2 g/dL    ALT (SGPT) 24 1 - 33 U/L    AST (SGOT) 28 1 - 32 U/L    Alkaline Phosphatase 54 39 - 117 U/L    Total Bilirubin 0.4 0.0 - 1.2 mg/dL    Globulin 2.8 gm/dL    A/G Ratio 1.6 g/dL    BUN/Creatinine Ratio 13.3 7.0 - 25.0    Anion Gap 15.0 5.0 - 15.0 mmol/L    eGFR 74.1 >60.0 mL/min/1.73   High Sensitivity Troponin T    Collection Time: 05/07/25  3:21 PM    Specimen: Blood   Result Value Ref Range    HS Troponin T 30 (H) <14 ng/L   C-reactive Protein    Collection Time: 05/07/25  3:21 PM    Specimen: Blood   Result Value Ref Range    C-Reactive Protein <0.30 0.00 - 0.50 mg/dL   Procalcitonin    Collection Time: 05/07/25  3:21 PM    Specimen: Blood   Result Value Ref Range    Procalcitonin 0.04 0.00 - 0.25 ng/mL   Magnesium    Collection Time: 05/07/25  3:21 PM    Specimen: Blood   Result Value Ref Range    Magnesium 1.6 1.6 - 2.4 mg/dL   D-dimer, Quantitative    Collection Time: 05/07/25  3:21 PM    Specimen: Blood   Result Value Ref Range    D-Dimer, Quantitative 0.45 0.00 - 0.74 MCGFEU/mL   CBC Auto Differential    Collection Time: 05/07/25  3:21 PM    Specimen: Blood   Result Value Ref Range    WBC 10.55 3.40 - 10.80 10*3/mm3    RBC 4.04 3.77 - 5.28 10*6/mm3     Hemoglobin 12.8 12.0 - 15.9 g/dL    Hematocrit 38.5 34.0 - 46.6 %    MCV 95.3 79.0 - 97.0 fL    MCH 31.7 26.6 - 33.0 pg    MCHC 33.2 31.5 - 35.7 g/dL    RDW 12.6 12.3 - 15.4 %    RDW-SD 44.7 37.0 - 54.0 fl    MPV 10.2 6.0 - 12.0 fL    Platelets 230 140 - 450 10*3/mm3    Neutrophil % 74.1 42.7 - 76.0 %    Lymphocyte % 18.8 (L) 19.6 - 45.3 %    Monocyte % 5.7 5.0 - 12.0 %    Eosinophil % 0.5 0.3 - 6.2 %    Basophil % 0.5 0.0 - 1.5 %    Immature Grans % 0.4 0.0 - 0.5 %    Neutrophils, Absolute 7.83 (H) 1.70 - 7.00 10*3/mm3    Lymphocytes, Absolute 1.98 0.70 - 3.10 10*3/mm3    Monocytes, Absolute 0.60 0.10 - 0.90 10*3/mm3    Eosinophils, Absolute 0.05 0.00 - 0.40 10*3/mm3    Basophils, Absolute 0.05 0.00 - 0.20 10*3/mm3    Immature Grans, Absolute 0.04 0.00 - 0.05 10*3/mm3    nRBC 0.0 0.0 - 0.2 /100 WBC   High Sensitivity Troponin T 1Hr    Collection Time: 05/07/25  4:34 PM    Specimen: Blood   Result Value Ref Range    HS Troponin T 49 (H) <14 ng/L    Troponin T Numeric Delta 19 (C) Abnormal if >/=3 ng/L    Troponin T % Delta 63 (C) Abnormal if >/= 20%   High Sensitivity Troponin T    Collection Time: 05/08/25 12:08 AM    Specimen: Blood   Result Value Ref Range    HS Troponin T 172 (C) <14 ng/L   Basic Metabolic Panel    Collection Time: 05/08/25  5:49 AM    Specimen: Blood   Result Value Ref Range    Glucose 124 (H) 65 - 99 mg/dL    BUN 14 8 - 23 mg/dL    Creatinine 0.84 0.57 - 1.00 mg/dL    Sodium 147 (H) 136 - 145 mmol/L    Potassium 4.2 3.5 - 5.2 mmol/L    Chloride 110 (H) 98 - 107 mmol/L    CO2 27.1 22.0 - 29.0 mmol/L    Calcium 9.3 8.6 - 10.5 mg/dL    BUN/Creatinine Ratio 16.7 7.0 - 25.0    Anion Gap 9.9 5.0 - 15.0 mmol/L    eGFR 73.0 >60.0 mL/min/1.73   CBC Auto Differential    Collection Time: 05/08/25  5:49 AM    Specimen: Blood   Result Value Ref Range    WBC 7.42 3.40 - 10.80 10*3/mm3    RBC 3.61 (L) 3.77 - 5.28 10*6/mm3    Hemoglobin 11.3 (L) 12.0 - 15.9 g/dL    Hematocrit 35.0 34.0 - 46.6 %    MCV 97.0  79.0 - 97.0 fL    MCH 31.3 26.6 - 33.0 pg    MCHC 32.3 31.5 - 35.7 g/dL    RDW 12.7 12.3 - 15.4 %    RDW-SD 44.8 37.0 - 54.0 fl    MPV 10.6 6.0 - 12.0 fL    Platelets 200 140 - 450 10*3/mm3    Neutrophil % 51.6 42.7 - 76.0 %    Lymphocyte % 38.0 19.6 - 45.3 %    Monocyte % 8.2 5.0 - 12.0 %    Eosinophil % 1.2 0.3 - 6.2 %    Basophil % 0.7 0.0 - 1.5 %    Immature Grans % 0.3 0.0 - 0.5 %    Neutrophils, Absolute 3.83 1.70 - 7.00 10*3/mm3    Lymphocytes, Absolute 2.82 0.70 - 3.10 10*3/mm3    Monocytes, Absolute 0.61 0.10 - 0.90 10*3/mm3    Eosinophils, Absolute 0.09 0.00 - 0.40 10*3/mm3    Basophils, Absolute 0.05 0.00 - 0.20 10*3/mm3    Immature Grans, Absolute 0.02 0.00 - 0.05 10*3/mm3    nRBC 0.0 0.0 - 0.2 /100 WBC   Hemoglobin A1c    Collection Time: 05/08/25  5:49 AM    Specimen: Blood   Result Value Ref Range    Hemoglobin A1C 6.80 (H) 4.80 - 5.60 %   Lipid Panel    Collection Time: 05/08/25  5:49 AM    Specimen: Blood   Result Value Ref Range    Total Cholesterol 111 0 - 200 mg/dL    Triglycerides 107 0 - 150 mg/dL    HDL Cholesterol 39 (L) 40 - 60 mg/dL    LDL Cholesterol  52 0 - 100 mg/dL    VLDL Cholesterol 20 5 - 40 mg/dL    LDL/HDL Ratio 1.30    Adult Transthoracic Echo Complete W/ Cont if Necessary Per Protocol    Collection Time: 05/08/25 10:05 AM   Result Value Ref Range    EF_3D-VOL 63.0 %    LVIDd 3.3 cm    LVIDs 2.5 cm    IVSd 1.11 cm    LVPWd 1.03 cm    FS 23.2 %    IVS/LVPW 1.08 cm    ESV(cubed) 15.8 ml    LV Sys Vol (BSA corrected) 16.0 cm2    EDV(cubed) 35.0 ml    LV Ernst Vol (BSA corrected) 42.7 cm2    LV mass(C)d 103.2 grams    LVOT area 3.0 cm2    LVOT diam 1.94 cm    EDV(MOD-sp2) 61.6 ml    EDV(MOD-sp4) 76.8 ml    ESV(MOD-sp2) 44.3 ml    ESV(MOD-sp4) 28.8 ml    SV(MOD-sp2) 17.3 ml    SV(MOD-sp4) 48.0 ml    SVi(MOD-SP2) 9.6 ml/m2    SVi(MOD-SP4) 26.7 ml/m2    SVi (LVOT) 30.4 ml/m2    EF(MOD-sp2) 28.1 %    EF(MOD-sp4) 62.5 %    MV E max olivia 101.0 cm/sec    MV A max olivia 114.0 cm/sec    MV dec  time 0.18 sec    MV E/A 0.89     LA ESV Index (BP) 18.7 ml/m2    Med Peak E' Arian 9.0 cm/sec    Lat Peak E' Arian 9.4 cm/sec    Avg E/e' ratio 10.98     SV(LVOT) 54.7 ml    RV Base 3.4 cm    RV Mid 3.3 cm    RV Length 8.0 cm    TAPSE (>1.6) 2.24 cm    RV S' 14.7 cm/sec    LA dimension (2D)  3.7 cm    LV V1 max 90.2 cm/sec    LV V1 max PG 3.3 mmHg    LV V1 mean PG 2.00 mmHg    LV V1 VTI 18.5 cm    Ao pk arian 129.0 cm/sec    Ao max PG 6.7 mmHg    Ao mean PG 4.0 mmHg    Ao V2 VTI 27.3 cm    TYSON(I,D) 2.00 cm2    Dimensionless Index 0.68 (DI)    MV max PG 5.7 mmHg    MV mean PG 4.0 mmHg    MV V2 VTI 34.9 cm    MVA(VTI) 1.57 cm2    MV dec slope 560.0 cm/sec2    RV V1 max PG 2.30 mmHg    RV V1 max 75.8 cm/sec    RV V1 VTI 19.0 cm    PA V2 max 137.0 cm/sec    PA acc time 0.20 sec    Ao root diam 3.0 cm        Procedures    Assessment / Plan      Assessment/Plan:   Diagnoses and all orders for this visit:    1. Sinusitis, unspecified chronicity, unspecified location (Primary)  -     amoxicillin (AMOXIL) 500 MG capsule; Take 2 capsules by mouth 2 (Two) Times a Day for 7 days.  Dispense: 28 capsule; Refill: 0    2. Colon cancer screening  -     Cologuard - Stool, Per Rectum; Future    3. Encounter for general adult medical examination with abnormal findings         Healthcare Maintenance:  Counseling provided based on age appropriate USPSTF guidelines.       Reema Marlena Brothers voices understanding and acceptance of this advice and will call back with any further questions or concerns. AVS with preventive healthcare tips printed for patient.     Vaccine Counseling:      Follow Up:   Return in about 6 months (around 12/18/2025) for HTN.    I have spent a total of 30 min on reviewing test results/preparing to see patient, counseling patient, performing medically appropriate exam and documenting clinical information in the electronic or other health record.         Ronaldo Gill, DO  [facility]

## 2025-06-20 ENCOUNTER — APPOINTMENT (OUTPATIENT)
Dept: CARDIAC REHAB | Facility: HOSPITAL | Age: 75
End: 2025-06-20
Payer: COMMERCIAL

## 2025-06-23 ENCOUNTER — APPOINTMENT (OUTPATIENT)
Dept: CARDIAC REHAB | Facility: HOSPITAL | Age: 75
End: 2025-06-23
Payer: COMMERCIAL

## 2025-07-24 ENCOUNTER — OFFICE VISIT (OUTPATIENT)
Age: 75
End: 2025-07-24
Payer: COMMERCIAL

## 2025-07-24 VITALS
WEIGHT: 169.8 LBS | HEIGHT: 63 IN | BODY MASS INDEX: 30.09 KG/M2 | SYSTOLIC BLOOD PRESSURE: 130 MMHG | DIASTOLIC BLOOD PRESSURE: 82 MMHG

## 2025-07-24 DIAGNOSIS — Z98.890 POST-OPERATIVE STATE: Primary | ICD-10-CM

## 2025-07-24 NOTE — PROGRESS NOTES
[facility]     Follow-up Office Visit       Date: 07/24/2025   Patient Name: Reema Brothers  MRN: [id number]  YOB: 1950    Chief Complaint:   Chief Complaint   Patient presents with    Follow-up     3 month follow up -- S/P Right index middle and ring finger release (DOS 4/11/25)       History of Present Illness:   Reema Brothers is a 75 y.o. female presents for follow-up of right hand distal intrinsic release.  She has been doing well since her last visit.  Denies pain.  Is able to make a full composite fist with the right hand.  Reports return of right  strength.  Has completed physical therapy.      Subjective   Review of Systems:   Review of Systems   Constitutional:  Negative for chills, fever, unexpected weight gain and unexpected weight loss.   HENT:  Negative for congestion, postnasal drip and rhinorrhea.    Eyes:  Negative for blurred vision.   Respiratory:  Negative for shortness of breath.    Cardiovascular:  Negative for leg swelling.   Gastrointestinal:  Negative for abdominal pain, nausea and vomiting.   Genitourinary:  Negative for difficulty urinating.   Musculoskeletal:  Positive for arthralgias. Negative for gait problem, joint swelling and myalgias.   Skin:  Negative for skin lesions and wound.   Neurological:  Negative for dizziness, weakness, light-headedness and numbness.   Hematological:  Does not bruise/bleed easily.   Psychiatric/Behavioral:  Negative for depressed mood.         Pertinent review of systems per HPI    I reviewed the patient's chief complaint, history of present illness, review of systems, past medical history, surgical history, family history, social history, medications and allergy list in the EMR on 07/24/2025 and agree with the findings above.    Objective    Vital Signs:   Vitals:    07/24/25 0950   BP: 130/82   Weight: 77 kg (169 lb 12.8 oz)   Height: 161 cm  "(63.39\")     BMI: Body mass index is 29.71 kg/m².     General Appearance: No acute distress. Alert and oriented.     Chest:  Non-labored breathing on room air      Ortho Exam:  Incisions all well-healed.  Patient able to go full composite fist.  Symmetric range of motion is of the opposite side.  Fingers warm and well-perfused distally  Sensation intact to light touch in the median, radial and ulnar nerve distributions    Imaging/Studies:   Imaging Results (Last 24 Hours)       ** No results found for the last 24 hours. **                Procedures:  Procedures    Quality Measures:   ACP:   ACP discussion was deferred.    Tobacco:   Reema Brothers  reports that she quit smoking about 5 years ago. Her smoking use included electronic cigarette and cigarettes. She started smoking about 58 years ago. She has a 53 pack-year smoking history. She has been exposed to tobacco smoke. She has never used smokeless tobacco.    Assessment / Plan    Assessment/Plan:      Diagnosis Plan   1. Post-operative state            Patient presents 3 months status post right distal intrinsic release, doing well postoperatively.  She has full flexion and extension of the right hand with no pain.  Recommend patient complete therapy.  Follow-up as needed.    Follow Up:   Return if symptoms worsen or fail to improve.        Raj Shoemaker MD  [facility]  "

## 2025-07-28 ENCOUNTER — OFFICE VISIT (OUTPATIENT)
Age: 75
End: 2025-07-28
Payer: COMMERCIAL

## 2025-07-28 VITALS
HEART RATE: 72 BPM | OXYGEN SATURATION: 95 % | SYSTOLIC BLOOD PRESSURE: 150 MMHG | HEIGHT: 63 IN | WEIGHT: 168 LBS | BODY MASS INDEX: 29.77 KG/M2 | DIASTOLIC BLOOD PRESSURE: 93 MMHG | TEMPERATURE: 98.2 F

## 2025-07-28 DIAGNOSIS — I10 PRIMARY HYPERTENSION: ICD-10-CM

## 2025-07-28 DIAGNOSIS — R60.9 EDEMA, UNSPECIFIED TYPE: Primary | ICD-10-CM

## 2025-07-28 PROCEDURE — 99213 OFFICE O/P EST LOW 20 MIN: CPT | Performed by: FAMILY MEDICINE

## 2025-07-28 NOTE — PROGRESS NOTES
Follow Up Office Visit      Date: 2025   Patient Name: Reema Brothers  : 1950   MRN: [id number]     Chief Complaint:    Chief Complaint   Patient presents with    Foot Swelling     Right foot worse       History of Present Illness: Reema Brothers is a 75 y.o. female who is here today for bilateral foot swelling.  Right worse than left.  STates that it has been swelling for about 3 weeks.    Did have an MI in May 2025, states since then she is been somewhat anxious when she has problems such as any type of twinge in her chest or headache.    HTN-does not routinely check her blood pressure at home.  Subjective      Review of Systems:   Review of Systems   Constitutional:  Negative for appetite change and unexpected weight loss.   HENT:  Negative for trouble swallowing.    Eyes:  Negative for blurred vision and double vision.   Respiratory:  Negative for cough and shortness of breath.    Cardiovascular:  Negative for chest pain and leg swelling.   Gastrointestinal:  Negative for blood in stool.   Endocrine: Negative for cold intolerance, heat intolerance and polyuria.   Musculoskeletal:  Negative for joint swelling.   Skin:  Negative for color change and bruise.   Neurological:  Negative for numbness and memory problem.   Hematological:  Does not bruise/bleed easily.   Psychiatric/Behavioral:  Negative for suicidal ideas and depressed mood. The patient is not nervous/anxious.        I have reviewed the patients family history, social history, past medical history, past surgical history and have updated it as appropriate.     Medications:     Current Outpatient Medications:     aspirin 81 MG EC tablet, Take 1 tablet by mouth Daily., Disp: , Rfl:     atorvastatin (LIPITOR) 80 MG tablet, Take 1 tablet by mouth Every Night., Disp: 90 tablet, Rfl: 1    Blood Glucose Monitoring Suppl (D-CARE GLUCOMETER) w/Device kit, 1 unit marking on U-100 syringe 3 (Three) Times a Day As Needed (as needed)., Disp:  "1 kit, Rfl: 0    Cholecalciferol (VITAMIN D3) 400 UNITS capsule, Take 1 tablet by mouth daily., Disp: , Rfl:     clotrimazole-betamethasone (LOTRISONE) 1-0.05 % cream, APPLY CREAM TOPICALLY TO AFFECTED AND SURROUNDING AREAS TWICE DAILY IN THE MORNING AND IN THE EVENING FOR 2 WEEKS, Disp: , Rfl:     cyanocobalamin (VITAMIN B-12N) 50 MCG tablet, Take 1 tablet by mouth Daily., Disp: , Rfl:     famotidine (PEPCID) 40 MG tablet, Take 1 tablet every day by oral route at bedtime for 90 days., Disp: , Rfl:     gabapentin (NEURONTIN) 100 MG capsule, Take 1 capsule by mouth 3 (Three) Times a Day., Disp: , Rfl:     glimepiride (Amaryl) 4 MG tablet, Take 1 tablet by mouth 2 (Two) Times a Day., Disp: 180 tablet, Rfl: 3    glucose blood test strip, Use as instructed, Disp: 100 each, Rfl: 12    Lancets misc, Use as directed, Disp: 100 each, Rfl: 12    levothyroxine (SYNTHROID, LEVOTHROID) 88 MCG tablet, Take 1 tablet by mouth Daily., Disp: , Rfl:     lisinopril (PRINIVIL,ZESTRIL) 5 MG tablet, Take 1 tablet by mouth Daily., Disp: 90 tablet, Rfl: 3    metFORMIN (GLUCOPHAGE) 1000 MG tablet, Take 1 tablet by mouth twice daily, Disp: 180 tablet, Rfl: 2    pantoprazole (PROTONIX) 40 MG EC tablet, Take 1 tablet by mouth Daily., Disp: , Rfl:     ticagrelor (BRILINTA) 90 MG tablet tablet, Take 1 tablet by mouth 2 (Two) Times a Day., Disp: , Rfl:     triamcinolone (KENALOG) 0.1 % cream, APPLY A THIN LAYER TO THE AFFECTED AREA(S) BY TOPICAL ROUTE 2 TIMES PER DAY, Disp: , Rfl:     metoprolol tartrate (LOPRESSOR) 25 MG tablet, Take 1 tablet by mouth 2 (Two) Times a Day., Disp: , Rfl:     Allergies:   Allergies   Allergen Reactions    Codeine Anaphylaxis    Nubain [Nalbuphine] Other (See Comments)     Excessive sedation       Objective     Physical Exam: Please see above  Vital Signs:   Vitals:    07/28/25 0857   BP: 150/93   Pulse: 72   Temp: 98.2 °F (36.8 °C)   SpO2: 95%   Weight: 76.2 kg (168 lb)   Height: 161 cm (63.39\")     Body mass index " is 29.39 kg/m².    Physical Exam  Vitals and nursing note reviewed.   Constitutional:       Appearance: Normal appearance.   HENT:      Head: Normocephalic and atraumatic.   Eyes:      General: Lids are normal.      Conjunctiva/sclera: Conjunctivae normal.   Cardiovascular:      Rate and Rhythm: Normal rate and regular rhythm.   Pulmonary:      Effort: Pulmonary effort is normal.      Breath sounds: Normal breath sounds and air entry.   Abdominal:      General: Abdomen is flat. Bowel sounds are normal.      Palpations: Abdomen is soft.   Musculoskeletal:      Cervical back: Full passive range of motion without pain and normal range of motion.   Skin:     Comments: Mild extremity edema, worse in the right compared to left.   Neurological:      General: No focal deficit present.      Mental Status: She is alert and oriented to person, place, and time.   Psychiatric:         Attention and Perception: Attention normal.         Mood and Affect: Mood normal.         Behavior: Behavior normal. Behavior is cooperative.         Procedures    Results:   Labs:   Hemoglobin A1C   Date Value Ref Range Status   05/08/2025 6.80 (H) 4.80 - 5.60 % Final     TSH   Date Value Ref Range Status   11/15/2021 1.030 0.270 - 4.200 uIU/mL Final   07/30/2021 8.510 (H) 0.270 - 4.200 uIU/mL Final        POCT Results (if applicable):   Results for orders placed or performed in visit on 06/18/25   Cologuard - Stool, Per Rectum    Collection Time: 07/11/25 10:25 AM    Specimen: Per Rectum; Stool   Result Value Ref Range    Cologuard Negative Negative       Imaging:   No valid procedures specified.            Assessment / Plan      Assessment/Plan:   Diagnoses and all orders for this visit:    1. Edema, unspecified type (Primary)  -     US Arterial Doppler Lower Extremity Complete; Future    2. Primary hypertension   - Continue the patient on her current medication plans of monitoring her blood pressure and making adjustments as we proceed  forward.               Vaccine Counseling:      Follow Up:   No follow-ups on file.        Ronaldo Gill DO  [facility] [address]

## 2025-08-28 ENCOUNTER — APPOINTMENT (OUTPATIENT)
Dept: CT IMAGING | Facility: HOSPITAL | Age: 75
End: 2025-08-28
Payer: COMMERCIAL

## 2025-08-28 ENCOUNTER — APPOINTMENT (OUTPATIENT)
Dept: GENERAL RADIOLOGY | Facility: HOSPITAL | Age: 75
End: 2025-08-28
Payer: COMMERCIAL

## 2025-08-28 ENCOUNTER — HOSPITAL ENCOUNTER (EMERGENCY)
Facility: HOSPITAL | Age: 75
Discharge: HOME | End: 2025-08-28
Attending: STUDENT IN AN ORGANIZED HEALTH CARE EDUCATION/TRAINING PROGRAM
Payer: COMMERCIAL

## 2025-08-28 VITALS
HEART RATE: 75 BPM | OXYGEN SATURATION: 96 % | RESPIRATION RATE: 18 BRPM | BODY MASS INDEX: 28.28 KG/M2 | WEIGHT: 176 LBS | HEIGHT: 66 IN | SYSTOLIC BLOOD PRESSURE: 158 MMHG | TEMPERATURE: 97.6 F | DIASTOLIC BLOOD PRESSURE: 83 MMHG

## 2025-08-28 DIAGNOSIS — S70.00XA HEMATOMA OF HIP, INITIAL ENCOUNTER: Primary | ICD-10-CM

## 2025-08-28 DIAGNOSIS — S70.02XA CONTUSION OF LEFT HIP AND THIGH, INITIAL ENCOUNTER: ICD-10-CM

## 2025-08-28 DIAGNOSIS — S70.12XA CONTUSION OF LEFT HIP AND THIGH, INITIAL ENCOUNTER: ICD-10-CM

## 2025-08-28 LAB
ALBUMIN SERPL-MCNC: 4 G/DL (ref 3.5–5.2)
ALBUMIN/GLOB SERPL: 1.4 G/DL
ALP SERPL-CCNC: 73 U/L (ref 39–117)
ALT SERPL W P-5'-P-CCNC: 92 U/L (ref 1–33)
ANION GAP SERPL CALCULATED.3IONS-SCNC: 11.8 MMOL/L (ref 5–15)
APTT PPP: 27.9 SECONDS (ref 70–100)
AST SERPL-CCNC: 78 U/L (ref 1–32)
BASOPHILS # BLD AUTO: 0.06 10*3/MM3 (ref 0–0.2)
BASOPHILS NFR BLD AUTO: 0.7 % (ref 0–1.5)
BILIRUB SERPL-MCNC: 0.5 MG/DL (ref 0–1.2)
BUN SERPL-MCNC: 13 MG/DL (ref 8–23)
BUN/CREAT SERPL: 16.9 (ref 7–25)
CALCIUM SPEC-SCNC: 8.9 MG/DL (ref 8.6–10.5)
CHLORIDE SERPL-SCNC: 104 MMOL/L (ref 98–107)
CO2 SERPL-SCNC: 23.2 MMOL/L (ref 22–29)
CREAT SERPL-MCNC: 0.77 MG/DL (ref 0.57–1)
DEPRECATED RDW RBC AUTO: 48.5 FL (ref 37–54)
EGFRCR SERPLBLD CKD-EPI 2021: 81.1 ML/MIN/1.73
EOSINOPHIL # BLD AUTO: 0.19 10*3/MM3 (ref 0–0.4)
EOSINOPHIL NFR BLD AUTO: 2.1 % (ref 0.3–6.2)
ERYTHROCYTE [DISTWIDTH] IN BLOOD BY AUTOMATED COUNT: 14.1 % (ref 12.3–15.4)
GLOBULIN UR ELPH-MCNC: 2.9 GM/DL
GLUCOSE SERPL-MCNC: 211 MG/DL (ref 65–99)
HCT VFR BLD AUTO: 33.6 % (ref 34–46.6)
HGB BLD-MCNC: 10.8 G/DL (ref 12–15.9)
IMM GRANULOCYTES # BLD AUTO: 0.02 10*3/MM3 (ref 0–0.05)
IMM GRANULOCYTES NFR BLD AUTO: 0.2 % (ref 0–0.5)
LIPASE SERPL-CCNC: 71 U/L (ref 13–60)
LYMPHOCYTES # BLD AUTO: 2.28 10*3/MM3 (ref 0.7–3.1)
LYMPHOCYTES NFR BLD AUTO: 25.7 % (ref 19.6–45.3)
MCH RBC QN AUTO: 30.2 PG (ref 26.6–33)
MCHC RBC AUTO-ENTMCNC: 32.1 G/DL (ref 31.5–35.7)
MCV RBC AUTO: 93.9 FL (ref 79–97)
MONOCYTES # BLD AUTO: 0.79 10*3/MM3 (ref 0.1–0.9)
MONOCYTES NFR BLD AUTO: 8.9 % (ref 5–12)
NEUTROPHILS NFR BLD AUTO: 5.54 10*3/MM3 (ref 1.7–7)
NEUTROPHILS NFR BLD AUTO: 62.4 % (ref 42.7–76)
NRBC BLD AUTO-RTO: 0 /100 WBC (ref 0–0.2)
PLATELET # BLD AUTO: 269 10*3/MM3 (ref 140–450)
PMV BLD AUTO: 11 FL (ref 6–12)
POTASSIUM SERPL-SCNC: 3.7 MMOL/L (ref 3.5–5.2)
PROT SERPL-MCNC: 6.9 G/DL (ref 6–8.5)
RBC # BLD AUTO: 3.58 10*6/MM3 (ref 3.77–5.28)
SODIUM SERPL-SCNC: 139 MMOL/L (ref 136–145)
WBC NRBC COR # BLD AUTO: 8.88 10*3/MM3 (ref 3.4–10.8)

## 2025-08-28 PROCEDURE — 85025 COMPLETE CBC W/AUTO DIFF WBC: CPT | Performed by: STUDENT IN AN ORGANIZED HEALTH CARE EDUCATION/TRAINING PROGRAM

## 2025-08-28 PROCEDURE — 99285 EMERGENCY DEPT VISIT HI MDM: CPT | Performed by: STUDENT IN AN ORGANIZED HEALTH CARE EDUCATION/TRAINING PROGRAM

## 2025-08-28 PROCEDURE — 73706 CT ANGIO LWR EXTR W/O&W/DYE: CPT

## 2025-08-28 PROCEDURE — 25010000002 FENTANYL CITRATE (PF) 50 MCG/ML SOLUTION: Performed by: STUDENT IN AN ORGANIZED HEALTH CARE EDUCATION/TRAINING PROGRAM

## 2025-08-28 PROCEDURE — 70450 CT HEAD/BRAIN W/O DYE: CPT

## 2025-08-28 PROCEDURE — 72125 CT NECK SPINE W/O DYE: CPT

## 2025-08-28 PROCEDURE — 85730 THROMBOPLASTIN TIME PARTIAL: CPT | Performed by: STUDENT IN AN ORGANIZED HEALTH CARE EDUCATION/TRAINING PROGRAM

## 2025-08-28 PROCEDURE — 83690 ASSAY OF LIPASE: CPT | Performed by: STUDENT IN AN ORGANIZED HEALTH CARE EDUCATION/TRAINING PROGRAM

## 2025-08-28 PROCEDURE — 25510000001 IOPAMIDOL 61 % SOLUTION: Performed by: STUDENT IN AN ORGANIZED HEALTH CARE EDUCATION/TRAINING PROGRAM

## 2025-08-28 PROCEDURE — 80053 COMPREHEN METABOLIC PANEL: CPT | Performed by: STUDENT IN AN ORGANIZED HEALTH CARE EDUCATION/TRAINING PROGRAM

## 2025-08-28 PROCEDURE — 73502 X-RAY EXAM HIP UNI 2-3 VIEWS: CPT

## 2025-08-28 RX ORDER — FENTANYL CITRATE 50 UG/ML
50 INJECTION, SOLUTION INTRAMUSCULAR; INTRAVENOUS
Refills: 0 | Status: DISCONTINUED | OUTPATIENT
Start: 2025-08-28 | End: 2025-08-28 | Stop reason: HOSPADM

## 2025-08-28 RX ORDER — IOPAMIDOL 612 MG/ML
100 INJECTION, SOLUTION INTRAVASCULAR
Status: COMPLETED | OUTPATIENT
Start: 2025-08-28 | End: 2025-08-28

## 2025-08-28 RX ADMIN — IOPAMIDOL 100 ML: 612 INJECTION, SOLUTION INTRAVENOUS at 02:12

## 2025-08-28 RX ADMIN — FENTANYL CITRATE 50 MCG: 0.05 INJECTION, SOLUTION INTRAMUSCULAR; INTRAVENOUS at 01:32
